# Patient Record
Sex: FEMALE | Race: WHITE | NOT HISPANIC OR LATINO | Employment: UNEMPLOYED | URBAN - METROPOLITAN AREA
[De-identification: names, ages, dates, MRNs, and addresses within clinical notes are randomized per-mention and may not be internally consistent; named-entity substitution may affect disease eponyms.]

---

## 2017-01-09 ENCOUNTER — APPOINTMENT (OUTPATIENT)
Dept: BONE DENSITY | Facility: HOSPITAL | Age: 47
End: 2017-01-09

## 2017-01-10 ENCOUNTER — APPOINTMENT (OUTPATIENT)
Dept: LAB | Facility: HOSPITAL | Age: 47
End: 2017-01-10

## 2017-01-10 ENCOUNTER — APPOINTMENT (OUTPATIENT)
Dept: ONCOLOGY | Facility: CLINIC | Age: 47
End: 2017-01-10

## 2017-01-12 ENCOUNTER — APPOINTMENT (OUTPATIENT)
Dept: BONE DENSITY | Facility: HOSPITAL | Age: 47
End: 2017-01-12

## 2017-01-12 DIAGNOSIS — M81.0 OSTEOPOROSIS: ICD-10-CM

## 2017-01-12 PROCEDURE — 77080 DXA BONE DENSITY AXIAL: CPT

## 2017-03-31 ENCOUNTER — HOSPITAL ENCOUNTER (EMERGENCY)
Facility: HOSPITAL | Age: 47
Discharge: HOME | End: 2017-03-31
Attending: EMERGENCY MEDICINE | Admitting: EMERGENCY MEDICINE

## 2017-03-31 ENCOUNTER — TRANSCRIBE ORDERS (OUTPATIENT)
Dept: ADMINISTRATIVE | Facility: HOSPITAL | Age: 47
End: 2017-03-31

## 2017-03-31 ENCOUNTER — APPOINTMENT (OUTPATIENT)
Dept: GENERAL RADIOLOGY | Facility: HOSPITAL | Age: 47
End: 2017-03-31

## 2017-03-31 VITALS
WEIGHT: 250 LBS | BODY MASS INDEX: 46.01 KG/M2 | RESPIRATION RATE: 18 BRPM | SYSTOLIC BLOOD PRESSURE: 114 MMHG | OXYGEN SATURATION: 97 % | HEIGHT: 62 IN | TEMPERATURE: 98.1 F | HEART RATE: 107 BPM | DIASTOLIC BLOOD PRESSURE: 71 MMHG

## 2017-03-31 DIAGNOSIS — J20.9 ACUTE BRONCHITIS, UNSPECIFIED ORGANISM: Primary | ICD-10-CM

## 2017-03-31 DIAGNOSIS — J18.9 UNRESOLVED PNEUMONIA: Primary | ICD-10-CM

## 2017-03-31 LAB
FLUAV AG NPH QL: NEGATIVE
FLUBV AG NPH QL IA: NEGATIVE

## 2017-03-31 PROCEDURE — 71020 HC CHEST PA AND LATERAL: CPT

## 2017-03-31 PROCEDURE — 99285 EMERGENCY DEPT VISIT HI MDM: CPT | Performed by: EMERGENCY MEDICINE

## 2017-03-31 PROCEDURE — 99283 EMERGENCY DEPT VISIT LOW MDM: CPT

## 2017-03-31 PROCEDURE — 87804 INFLUENZA ASSAY W/OPTIC: CPT | Performed by: EMERGENCY MEDICINE

## 2017-03-31 RX ORDER — AZITHROMYCIN 250 MG/1
TABLET, FILM COATED ORAL
Qty: 6 TABLET | Refills: 0 | Status: SHIPPED | OUTPATIENT
Start: 2017-03-31

## 2017-03-31 RX ORDER — HYDROCORTISONE 10 MG/1
20 TABLET ORAL DAILY
COMMUNITY

## 2017-03-31 RX ORDER — ALBUTEROL SULFATE 2.5 MG/3ML
2.5 SOLUTION RESPIRATORY (INHALATION) EVERY 4 HOURS PRN
COMMUNITY

## 2017-03-31 RX ORDER — LORAZEPAM 2 MG/ML
1 INJECTION INTRAMUSCULAR ONCE
Status: DISCONTINUED | OUTPATIENT
Start: 2017-03-31 | End: 2017-03-31

## 2017-03-31 RX ORDER — HYDROCORTISONE 5 MG/1
5 TABLET ORAL NIGHTLY
COMMUNITY

## 2017-03-31 RX ORDER — SODIUM CHLORIDE 0.9 % (FLUSH) 0.9 %
10 SYRINGE (ML) INJECTION AS NEEDED
Status: DISCONTINUED | OUTPATIENT
Start: 2017-03-31 | End: 2017-03-31

## 2020-05-13 ENCOUNTER — TELEPHONE (OUTPATIENT)
Dept: HEMATOLOGY/ONCOLOGY | Facility: HOSPITAL | Age: 50
End: 2020-05-13

## 2020-05-13 NOTE — TELEPHONE ENCOUNTER
----- Message from Jeff Mcneill MD sent at 5/13/2020  6:55 AM CDT -----  Hi,    I have a consult with her tomorrow. No records in Epic or Media tab? Did anything get faxed?

## 2020-05-13 NOTE — PROGRESS NOTES
[facility] Note    Diagnosis:   APLS    Treatment History:   Coumadin     Visit Diagnosis:  Antiphospholipid antibody syndrome ([facility]  (primary encounter diagnosis)    History of Present Illness: 49F with a PMH of SLE, RA, Osteopo Oral Tab, Take 20 mg by mouth 2 (two) times daily. , Disp: , Rfl:   atorvastatin 10 MG Oral Tab, Take 10 mg by mouth nightly., Disp: , Rfl:   lisinopril 10 MG Oral Tab, Take 10 mg by mouth daily. , Disp: , Rfl:   PREDNISONE 10 MG Oral Tab, Take 10 mg by mout Check CBC, Ferritin, TIBC/Fe, Retic, H87, Folic acid   - Tolerated Venofer in the past.     RA: She states that she is on q6 month Rituxan and prednisone. Referred to Rheumatology.  Will check baseline RF, DAISY    Health Maintenance  - PCP referral  - Will n

## 2020-05-14 ENCOUNTER — OFFICE VISIT (OUTPATIENT)
Dept: HEMATOLOGY/ONCOLOGY | Facility: HOSPITAL | Age: 50
End: 2020-05-14
Attending: INTERNAL MEDICINE
Payer: COMMERCIAL

## 2020-05-14 VITALS
OXYGEN SATURATION: 99 % | BODY MASS INDEX: 49.76 KG/M2 | RESPIRATION RATE: 16 BRPM | WEIGHT: 215 LBS | SYSTOLIC BLOOD PRESSURE: 135 MMHG | HEIGHT: 55 IN | DIASTOLIC BLOOD PRESSURE: 85 MMHG | HEART RATE: 85 BPM

## 2020-05-14 DIAGNOSIS — D68.61 ANTIPHOSPHOLIPID ANTIBODY SYNDROME (HCC): Primary | ICD-10-CM

## 2020-05-14 PROCEDURE — 99204 OFFICE O/P NEW MOD 45 MIN: CPT | Performed by: INTERNAL MEDICINE

## 2020-05-14 RX ORDER — ATORVASTATIN CALCIUM 10 MG/1
10 TABLET, FILM COATED ORAL NIGHTLY
COMMUNITY

## 2020-05-14 RX ORDER — ALPRAZOLAM 0.25 MG/1
0.25 TABLET ORAL NIGHTLY PRN
COMMUNITY

## 2020-05-14 RX ORDER — HYDROCODONE BITARTRATE AND ACETAMINOPHEN 10; 325 MG/1; MG/1
1 TABLET ORAL EVERY 6 HOURS PRN
COMMUNITY
End: 2020-08-24

## 2020-05-14 RX ORDER — WARFARIN SODIUM 5 MG/1
5 TABLET ORAL NIGHTLY
COMMUNITY
End: 2020-09-01

## 2020-05-14 RX ORDER — FLUCONAZOLE 100 MG/1
100 TABLET ORAL DAILY
COMMUNITY

## 2020-05-14 RX ORDER — WARFARIN SODIUM 1 MG/1
1 TABLET ORAL NIGHTLY
COMMUNITY
End: 2020-05-18

## 2020-05-14 RX ORDER — POTASSIUM CHLORIDE 750 MG/1
10 TABLET, EXTENDED RELEASE ORAL 2 TIMES DAILY
COMMUNITY

## 2020-05-14 RX ORDER — BUTALBITAL, ACETAMINOPHEN AND CAFFEINE 50; 325; 40 MG/1; MG/1; MG/1
1 TABLET ORAL EVERY 4 HOURS PRN
COMMUNITY

## 2020-05-14 RX ORDER — LIOTHYRONINE SODIUM 5 UG/1
5 TABLET ORAL DAILY
COMMUNITY

## 2020-05-14 RX ORDER — LISINOPRIL 10 MG/1
10 TABLET ORAL DAILY
COMMUNITY

## 2020-05-14 RX ORDER — PREDNISONE 10 MG/1
10 TABLET ORAL DAILY
COMMUNITY
End: 2021-02-05

## 2020-05-14 RX ORDER — LEVOTHYROXINE SODIUM 0.07 MG/1
75 TABLET ORAL
COMMUNITY

## 2020-05-14 RX ORDER — WARFARIN SODIUM 4 MG/1
4 TABLET ORAL
COMMUNITY
End: 2020-06-29

## 2020-05-14 RX ORDER — FUROSEMIDE 20 MG/1
20 TABLET ORAL 2 TIMES DAILY
COMMUNITY

## 2020-05-14 RX ORDER — PHENTERMINE HYDROCHLORIDE 37.5 MG/1
37.5 TABLET ORAL
COMMUNITY

## 2020-05-18 ENCOUNTER — ANTI-COAG VISIT (OUTPATIENT)
Dept: HEMATOLOGY/ONCOLOGY | Facility: HOSPITAL | Age: 50
End: 2020-05-18

## 2020-05-18 ENCOUNTER — TELEPHONE (OUTPATIENT)
Dept: HEMATOLOGY/ONCOLOGY | Facility: HOSPITAL | Age: 50
End: 2020-05-18

## 2020-05-18 DIAGNOSIS — D68.61 ANTIPHOSPHOLIPID ANTIBODY SYNDROME (HCC): ICD-10-CM

## 2020-05-18 RX ORDER — WARFARIN SODIUM 1 MG/1
3 TABLET ORAL NIGHTLY
Qty: 90 TABLET | Refills: 0 | Status: SHIPPED | OUTPATIENT
Start: 2020-05-18 | End: 2020-09-01

## 2020-05-18 NOTE — TELEPHONE ENCOUNTER
Bonnie Nieto MD  [facility]             Results reviewed. She needs to f/u with Rheumatology, I gave her a referral on the last visit. Thanks      Pt informed and verbalized understanding. She states she also made an appt with a PCP.   First a

## 2020-05-22 ENCOUNTER — ANTI-COAG VISIT (OUTPATIENT)
Dept: HEMATOLOGY/ONCOLOGY | Facility: HOSPITAL | Age: 50
End: 2020-05-22

## 2020-05-22 ENCOUNTER — TELEPHONE (OUTPATIENT)
Dept: HEMATOLOGY/ONCOLOGY | Facility: HOSPITAL | Age: 50
End: 2020-05-22

## 2020-05-22 DIAGNOSIS — D68.61 ANTIPHOSPHOLIPID ANTIBODY SYNDROME (HCC): ICD-10-CM

## 2020-05-22 NOTE — TELEPHONE ENCOUNTER
Denita called to say that her PT/INR was at a 2.2 and her current warfarin does is a 3. Left message to return call.

## 2020-05-29 ENCOUNTER — TELEPHONE (OUTPATIENT)
Dept: HEMATOLOGY/ONCOLOGY | Facility: HOSPITAL | Age: 50
End: 2020-05-29

## 2020-05-29 ENCOUNTER — ANTI-COAG VISIT (OUTPATIENT)
Dept: HEMATOLOGY/ONCOLOGY | Facility: HOSPITAL | Age: 50
End: 2020-05-29

## 2020-05-29 DIAGNOSIS — D68.61 ANTIPHOSPHOLIPID ANTIBODY SYNDROME (HCC): ICD-10-CM

## 2020-05-29 NOTE — TELEPHONE ENCOUNTER
Patient is calling her PT/INR which is 2.5. Denita will be sending in a form for blood testing machine to be filled out.

## 2020-06-04 ENCOUNTER — SOCIAL WORK SERVICES (OUTPATIENT)
Dept: HEMATOLOGY/ONCOLOGY | Facility: HOSPITAL | Age: 50
End: 2020-06-04

## 2020-06-04 ENCOUNTER — TELEPHONE (OUTPATIENT)
Dept: HEMATOLOGY/ONCOLOGY | Facility: HOSPITAL | Age: 50
End: 2020-06-04

## 2020-06-04 NOTE — PROGRESS NOTES
SW contacted patient regarding her INR paperwork. GISEL asked that patient return this call so that SW can help her with this form/letter.

## 2020-06-04 NOTE — TELEPHONE ENCOUNTER
Patient stated that she faxed over some forms for Dr. Daphne Ramirez to fill out so she can purchase a INR meter, because she is renting one and they have increased the fee to $25.00 and she would like a call back to discuss this matter.

## 2020-06-05 ENCOUNTER — ANTI-COAG VISIT (OUTPATIENT)
Dept: HEMATOLOGY/ONCOLOGY | Facility: HOSPITAL | Age: 50
End: 2020-06-05

## 2020-06-05 ENCOUNTER — TELEPHONE (OUTPATIENT)
Dept: HEMATOLOGY/ONCOLOGY | Facility: HOSPITAL | Age: 50
End: 2020-06-05

## 2020-06-05 ENCOUNTER — SOCIAL WORK SERVICES (OUTPATIENT)
Dept: HEMATOLOGY/ONCOLOGY | Facility: HOSPITAL | Age: 50
End: 2020-06-05

## 2020-06-05 DIAGNOSIS — D68.61 ANTIPHOSPHOLIPID ANTIBODY SYNDROME (HCC): ICD-10-CM

## 2020-06-05 NOTE — PROGRESS NOTES
The patients medical authorization form for an INR machine to buy has been completed and faxed. Patient is aware of this. GISEL faxed to [facility] at [phone].

## 2020-06-12 ENCOUNTER — ANTI-COAG VISIT (OUTPATIENT)
Dept: HEMATOLOGY/ONCOLOGY | Facility: HOSPITAL | Age: 50
End: 2020-06-12

## 2020-06-12 ENCOUNTER — TELEPHONE (OUTPATIENT)
Dept: HEMATOLOGY/ONCOLOGY | Facility: HOSPITAL | Age: 50
End: 2020-06-12

## 2020-06-12 DIAGNOSIS — D68.61 ANTIPHOSPHOLIPID ANTIBODY SYNDROME (HCC): ICD-10-CM

## 2020-06-22 ENCOUNTER — ANTI-COAG VISIT (OUTPATIENT)
Dept: HEMATOLOGY/ONCOLOGY | Facility: HOSPITAL | Age: 50
End: 2020-06-22

## 2020-06-22 ENCOUNTER — TELEPHONE (OUTPATIENT)
Dept: HEMATOLOGY/ONCOLOGY | Facility: HOSPITAL | Age: 50
End: 2020-06-22

## 2020-06-22 DIAGNOSIS — D68.61 ANTIPHOSPHOLIPID ANTIBODY SYNDROME (HCC): ICD-10-CM

## 2020-06-24 ENCOUNTER — HOSPITAL ENCOUNTER (OUTPATIENT)
Dept: MAMMOGRAPHY | Age: 50
Discharge: HOME | End: 2020-06-24
Attending: INTERNAL MEDICINE
Payer: COMMERCIAL

## 2020-06-24 DIAGNOSIS — D68.61 ANTIPHOSPHOLIPID ANTIBODY SYNDROME (HCC): ICD-10-CM

## 2020-06-24 PROCEDURE — 77063 BREAST TOMOSYNTHESIS BI: CPT | Performed by: INTERNAL MEDICINE

## 2020-06-24 PROCEDURE — 77067 SCR MAMMO BI INCL CAD: CPT | Performed by: INTERNAL MEDICINE

## 2020-06-25 ENCOUNTER — TELEPHONE (OUTPATIENT)
Dept: HEMATOLOGY/ONCOLOGY | Facility: HOSPITAL | Age: 50
End: 2020-06-25

## 2020-06-29 ENCOUNTER — TELEPHONE (OUTPATIENT)
Dept: HEMATOLOGY/ONCOLOGY | Facility: HOSPITAL | Age: 50
End: 2020-06-29

## 2020-06-29 ENCOUNTER — ANTI-COAG VISIT (OUTPATIENT)
Dept: HEMATOLOGY/ONCOLOGY | Facility: HOSPITAL | Age: 50
End: 2020-06-29

## 2020-06-29 DIAGNOSIS — D68.61 ANTIPHOSPHOLIPID ANTIBODY SYNDROME (HCC): ICD-10-CM

## 2020-06-29 RX ORDER — WARFARIN SODIUM 4 MG/1
4 TABLET ORAL DAILY
Qty: 30 TABLET | Refills: 0 | Status: SHIPPED | OUTPATIENT
Start: 2020-06-29 | End: 2021-07-20

## 2020-07-10 ENCOUNTER — ANTI-COAG VISIT (OUTPATIENT)
Dept: HEMATOLOGY/ONCOLOGY | Facility: HOSPITAL | Age: 50
End: 2020-07-10

## 2020-07-10 ENCOUNTER — TELEPHONE (OUTPATIENT)
Dept: HEMATOLOGY/ONCOLOGY | Facility: HOSPITAL | Age: 50
End: 2020-07-10

## 2020-07-10 DIAGNOSIS — D68.61 ANTIPHOSPHOLIPID ANTIBODY SYNDROME (HCC): ICD-10-CM

## 2020-07-10 LAB — INR: 2.5 (ref 0.8–1.2)

## 2020-07-20 ENCOUNTER — TELEPHONE (OUTPATIENT)
Dept: HEMATOLOGY/ONCOLOGY | Facility: HOSPITAL | Age: 50
End: 2020-07-20

## 2020-07-20 ENCOUNTER — ANTI-COAG VISIT (OUTPATIENT)
Dept: HEMATOLOGY/ONCOLOGY | Facility: HOSPITAL | Age: 50
End: 2020-07-20

## 2020-07-20 DIAGNOSIS — D68.61 ANTIPHOSPHOLIPID ANTIBODY SYNDROME (HCC): ICD-10-CM

## 2020-07-20 LAB — INR: 2.3 (ref 0.8–1.2)

## 2020-07-21 ENCOUNTER — TELEPHONE (OUTPATIENT)
Dept: HEMATOLOGY/ONCOLOGY | Facility: HOSPITAL | Age: 50
End: 2020-07-21

## 2020-07-21 LAB — INR: 3 (ref 0.8–1.2)

## 2020-07-21 NOTE — TELEPHONE ENCOUNTER
Denita says the INR machine was broken and not working right, sos he's not sure her INR she reported was accurate. She says today it is 3.0.  Please call

## 2020-07-31 ENCOUNTER — ANTI-COAG VISIT (OUTPATIENT)
Dept: HEMATOLOGY/ONCOLOGY | Facility: HOSPITAL | Age: 50
End: 2020-07-31

## 2020-07-31 ENCOUNTER — TELEPHONE (OUTPATIENT)
Dept: HEMATOLOGY/ONCOLOGY | Facility: HOSPITAL | Age: 50
End: 2020-07-31

## 2020-07-31 DIAGNOSIS — D68.61 ANTIPHOSPHOLIPID ANTIBODY SYNDROME (HCC): ICD-10-CM

## 2020-07-31 LAB — INR: 2.4 (ref 0.8–1.2)

## 2020-08-07 ENCOUNTER — TELEPHONE (OUTPATIENT)
Dept: FAMILY MEDICINE CLINIC | Facility: CLINIC | Age: 50
End: 2020-08-07

## 2020-08-07 NOTE — TELEPHONE ENCOUNTER
Patient was a no show for her new patient appt with Dr. Yin KAPLAN for patient to call office to RS.

## 2020-08-18 ENCOUNTER — ANTI-COAG VISIT (OUTPATIENT)
Dept: HEMATOLOGY/ONCOLOGY | Facility: HOSPITAL | Age: 50
End: 2020-08-18

## 2020-08-18 DIAGNOSIS — D68.61 ANTIPHOSPHOLIPID ANTIBODY SYNDROME (HCC): ICD-10-CM

## 2020-08-18 LAB — INR: 2.5 (ref 0.8–1.2)

## 2020-08-24 ENCOUNTER — OFFICE VISIT (OUTPATIENT)
Dept: RHEUMATOLOGY | Facility: CLINIC | Age: 50
End: 2020-08-24
Payer: COMMERCIAL

## 2020-08-24 VITALS
HEART RATE: 94 BPM | WEIGHT: 210.81 LBS | DIASTOLIC BLOOD PRESSURE: 80 MMHG | HEIGHT: 62 IN | RESPIRATION RATE: 18 BRPM | TEMPERATURE: 98 F | BODY MASS INDEX: 38.79 KG/M2 | SYSTOLIC BLOOD PRESSURE: 130 MMHG

## 2020-08-24 DIAGNOSIS — M25.50 POLYARTHRALGIA: ICD-10-CM

## 2020-08-24 DIAGNOSIS — D68.61 ANTIPHOSPHOLIPID ANTIBODY SYNDROME (HCC): ICD-10-CM

## 2020-08-24 DIAGNOSIS — Z87.39 HISTORY OF RHEUMATOID ARTHRITIS: ICD-10-CM

## 2020-08-24 DIAGNOSIS — R76.8 SS-A ANTIBODY POSITIVE: ICD-10-CM

## 2020-08-24 DIAGNOSIS — R76.8 HISTONE ANTIBODY POSITIVE: ICD-10-CM

## 2020-08-24 DIAGNOSIS — R76.8 DS DNA ANTIBODY POSITIVE: ICD-10-CM

## 2020-08-24 DIAGNOSIS — R53.82 CHRONIC FATIGUE: ICD-10-CM

## 2020-08-24 DIAGNOSIS — M32.19 OTHER SYSTEMIC LUPUS ERYTHEMATOSUS WITH OTHER ORGAN INVOLVEMENT (HCC): Primary | ICD-10-CM

## 2020-08-24 PROCEDURE — 99244 OFF/OP CNSLTJ NEW/EST MOD 40: CPT | Performed by: INTERNAL MEDICINE

## 2020-08-24 PROCEDURE — 3079F DIAST BP 80-89 MM HG: CPT | Performed by: INTERNAL MEDICINE

## 2020-08-24 PROCEDURE — 3075F SYST BP GE 130 - 139MM HG: CPT | Performed by: INTERNAL MEDICINE

## 2020-08-24 PROCEDURE — 3008F BODY MASS INDEX DOCD: CPT | Performed by: INTERNAL MEDICINE

## 2020-08-24 RX ORDER — HYDROCODONE BITARTRATE AND ACETAMINOPHEN 10; 325 MG/1; MG/1
1 TABLET ORAL EVERY 6 HOURS PRN
Qty: 120 TABLET | Refills: 0 | Status: SHIPPED | OUTPATIENT
Start: 2020-08-24

## 2020-08-24 RX ORDER — FUROSEMIDE 20 MG/1
TABLET ORAL
COMMUNITY
End: 2020-08-24

## 2020-08-24 RX ORDER — TOPIRAMATE 100 MG/1
TABLET, FILM COATED ORAL 2 TIMES DAILY
COMMUNITY
Start: 2020-07-23

## 2020-08-24 RX ORDER — LEVOTHYROXINE SODIUM 0.07 MG/1
TABLET ORAL
COMMUNITY
End: 2020-08-24

## 2020-08-24 RX ORDER — LIOTHYRONINE SODIUM 5 UG/1
TABLET ORAL
COMMUNITY
End: 2020-08-24

## 2020-08-24 NOTE — PATIENT INSTRUCTIONS
-- get updated labs to evaluate for status of lupus and RA  -- have records faxed to my office [phone]  -- for dry mouth, try OTC biotene products (toothpaste, oral rinses, lozenges)   -- follow up in about 2-3 weeks to discuss test results in detail

## 2020-08-24 NOTE — PROGRESS NOTES
?  RHEUMATOLOGY NEW PATIENT   Date of visit: 8/24/2020  ? Patient presents with:  Establish Care: new pt.  Dr. Maribell Nicole referral. Pt has lupus (Dx at age 7), fibro (dx in 2015 during knee replacement), and RA (dx at 11years old), gets blood clots from surg to some fatty liver, so will need to be cautious and monitor her LFTs closely. We will refill her [place] this time, however I will defer this to her PCP. I do not treat fibromyalgia with opioids.   However if patient's PCP believes this is necessary, I bethel PLATELET  -     COMP METABOLIC PANEL (14)  -     URINALYSIS, ROUTINE  -     DSDNA (CRITHIDIA LUCILIAE) ANTIBODY IGG BY IFA;  Future  -     HEPATITIS PANEL, ACUTE (4)  -     QUANTIFERON TB    History of rheumatoid arthritis  -     DAISY BY IFA WITH REFLEX  - new rheumatologist.    States at age 7, diagnosed with both RA and lupus. States symptoms at that time were her passing out, dx with fluid around the heart/pericarditis. States RA dx based off blood tests. Was treated with prednisone for several years.  St ambulate with a cane, worsened with prolonged activity  + morning stiffness, (worst in right leg) but felt in ankles bilaterally  + chronic changes of the right hand from previous inflammation.  Tried to have surgically fixed but state did not last for more times daily. , Disp: , Rfl:   HYDROcodone-acetaminophen  MG Oral Tab, Take 1 tablet by mouth every 6 (six) hours as needed for Pain., Disp: 120 tablet, Rfl: 0  Warfarin Sodium 5 MG Oral Tab, Take 5 mg by mouth nightly., Disp: , Rfl:   furosemide 20 MG ?  Review of Systems   Constitutional: Positive for malaise/fatigue. Negative for chills, fever and weight loss. HENT: Positive for congestion. Negative for hearing loss, nosebleeds, sore throat and tinnitus. Eyes: Positive for blurred vision.  Domenico Box normal and breath sounds normal. No respiratory distress. She has no wheezes. Abdominal: Soft. She exhibits no distension. Musculoskeletal:         General: Tenderness and deformity present. No edema.       Comments: Reversible ulnar deviation with MCP (H) 05/14/2020    BUNCREA 18.3 05/14/2020    CREATSERUM 1.04 (H) 05/14/2020    ANIONGAP 6 05/14/2020    GFRNAA 64 05/14/2020    GFRAA 73 05/14/2020    CA 8.7 05/14/2020    OSMOCALC 294 05/14/2020    ALKPHO 81 05/14/2020    AST 16 05/14/2020    ALT 16 05/14

## 2020-09-01 ENCOUNTER — ANTI-COAG VISIT (OUTPATIENT)
Dept: HEMATOLOGY/ONCOLOGY | Facility: HOSPITAL | Age: 50
End: 2020-09-01

## 2020-09-01 ENCOUNTER — TELEPHONE (OUTPATIENT)
Dept: HEMATOLOGY/ONCOLOGY | Facility: HOSPITAL | Age: 50
End: 2020-09-01

## 2020-09-01 DIAGNOSIS — D68.61 ANTIPHOSPHOLIPID ANTIBODY SYNDROME (HCC): ICD-10-CM

## 2020-09-01 LAB — INR: 1.8 (ref 0.8–1.2)

## 2020-09-01 RX ORDER — WARFARIN SODIUM 1 MG/1
1 TABLET ORAL NIGHTLY
Qty: 30 TABLET | Refills: 0 | Status: SHIPPED | OUTPATIENT
Start: 2020-09-01 | End: 2021-07-20

## 2020-09-01 RX ORDER — WARFARIN SODIUM 5 MG/1
5 TABLET ORAL NIGHTLY
Qty: 30 TABLET | Refills: 0 | Status: SHIPPED | OUTPATIENT
Start: 2020-09-01 | End: 2021-07-20

## 2020-09-01 NOTE — PROGRESS NOTES
THE [facility] Hematology and Oncology Clinic Note    Diagnosis:   Recurrent DVT    Treatment History:   Coumadin     Visit Diagnosis:  Antiphospholipid antibody syndrome ([facility]  (primary encounter diagnosis)  Leg pain, anterior, right    History of Present Illness was negative for LA, B2GP and Cardiolipins. She met with Dr. Phylicia Javier on 8/24/20 and AI work up in process. MTX was also discussed. Today she states that she is very stressed since her brother was arrested in [place]. Her dog also recently passed away.  She mcg by mouth before breakfast., Disp: , Rfl:     No current facility-administered medications on file prior to visit.      Past Medical History:   Diagnosis Date   • DVT, recurrent, lower extremity, acute, right (HCC)    • Fatty liver    • Fibromyalgia    • CO2 25.0 05/14/2020     05/14/2020    BUN 19 (H) 05/14/2020    ALB 3.8 05/14/2020       Radiology: n/a    Pathology: n/a    Assessment and Plan:  Recurrent Venous and Arterial Thrombosis and ?possible APLS  · reported to be triple positive with re

## 2020-09-01 NOTE — TELEPHONE ENCOUNTER
Spoke with patient. She is also due for f/u. Requested appointment for tomorrow. Appointment made. Time/date/location all verified and confirmed with patient.

## 2020-09-02 ENCOUNTER — TELEPHONE (OUTPATIENT)
Dept: HEMATOLOGY/ONCOLOGY | Facility: HOSPITAL | Age: 50
End: 2020-09-02

## 2020-09-02 ENCOUNTER — OFFICE VISIT (OUTPATIENT)
Dept: HEMATOLOGY/ONCOLOGY | Facility: HOSPITAL | Age: 50
End: 2020-09-02
Attending: INTERNAL MEDICINE
Payer: COMMERCIAL

## 2020-09-02 ENCOUNTER — HOSPITAL ENCOUNTER (OUTPATIENT)
Dept: CT IMAGING | Facility: HOSPITAL | Age: 50
Discharge: HOME | End: 2020-09-02
Attending: INTERNAL MEDICINE
Payer: COMMERCIAL

## 2020-09-02 ENCOUNTER — HOSPITAL ENCOUNTER (OUTPATIENT)
Dept: ULTRASOUND IMAGING | Facility: HOSPITAL | Age: 50
Discharge: HOME | End: 2020-09-02
Attending: INTERNAL MEDICINE
Payer: COMMERCIAL

## 2020-09-02 VITALS
HEIGHT: 62.01 IN | SYSTOLIC BLOOD PRESSURE: 123 MMHG | WEIGHT: 211.38 LBS | DIASTOLIC BLOOD PRESSURE: 78 MMHG | TEMPERATURE: 98 F | OXYGEN SATURATION: 98 % | BODY MASS INDEX: 38.41 KG/M2 | HEART RATE: 83 BPM | RESPIRATION RATE: 16 BRPM

## 2020-09-02 DIAGNOSIS — M25.50 POLYARTHRALGIA: ICD-10-CM

## 2020-09-02 DIAGNOSIS — M79.604 LEG PAIN, ANTERIOR, RIGHT: ICD-10-CM

## 2020-09-02 DIAGNOSIS — R76.8 HISTONE ANTIBODY POSITIVE: ICD-10-CM

## 2020-09-02 DIAGNOSIS — R76.8 DS DNA ANTIBODY POSITIVE: ICD-10-CM

## 2020-09-02 DIAGNOSIS — Z87.39 HISTORY OF RHEUMATOID ARTHRITIS: ICD-10-CM

## 2020-09-02 DIAGNOSIS — D68.61 ANTIPHOSPHOLIPID ANTIBODY SYNDROME (HCC): Primary | ICD-10-CM

## 2020-09-02 DIAGNOSIS — D68.61 ANTIPHOSPHOLIPID ANTIBODY SYNDROME (HCC): ICD-10-CM

## 2020-09-02 DIAGNOSIS — R76.8 SS-A ANTIBODY POSITIVE: ICD-10-CM

## 2020-09-02 DIAGNOSIS — M32.19 OTHER SYSTEMIC LUPUS ERYTHEMATOSUS WITH OTHER ORGAN INVOLVEMENT (HCC): ICD-10-CM

## 2020-09-02 LAB
ALBUMIN SERPL-MCNC: 3.6 G/DL (ref 3.4–5)
ALBUMIN/GLOB SERPL: 1.2 {RATIO} (ref 1–2)
ALP LIVER SERPL-CCNC: 77 U/L (ref 39–100)
ALT SERPL-CCNC: 13 U/L (ref 13–56)
ANION GAP SERPL CALC-SCNC: 5 MMOL/L (ref 0–18)
AST SERPL-CCNC: 13 U/L (ref 15–37)
BASOPHILS # BLD AUTO: 0.01 X10(3) UL (ref 0–0.2)
BASOPHILS NFR BLD AUTO: 0.1 %
BILIRUB SERPL-MCNC: 0.3 MG/DL (ref 0.1–2)
BILIRUB UR QL STRIP.AUTO: NEGATIVE
BUN BLD-MCNC: 19 MG/DL (ref 7–18)
BUN/CREAT SERPL: 20.2 (ref 10–20)
C3 SERPL-MCNC: 114 MG/DL (ref 90–180)
C4 SERPL-MCNC: 22.4 MG/DL (ref 10–40)
CALCIUM BLD-MCNC: 9.1 MG/DL (ref 8.5–10.1)
CHLORIDE SERPL-SCNC: 113 MMOL/L (ref 98–112)
CLARITY UR REFRACT.AUTO: CLEAR
CO2 SERPL-SCNC: 23 MMOL/L (ref 21–32)
COLOR UR AUTO: YELLOW
CREAT BLD-MCNC: 0.94 MG/DL (ref 0.55–1.02)
CRP SERPL-MCNC: 1.89 MG/DL (ref ?–0.3)
D-DIMER: 0.74 UG/ML FEU (ref ?–0.5)
DEPRECATED HBV CORE AB SER IA-ACNC: 166.7 NG/ML (ref 12–240)
DEPRECATED RDW RBC AUTO: 41.5 FL (ref 35.1–46.3)
EOSINOPHIL # BLD AUTO: 0.03 X10(3) UL (ref 0–0.7)
EOSINOPHIL NFR BLD AUTO: 0.3 %
ERYTHROCYTE [DISTWIDTH] IN BLOOD BY AUTOMATED COUNT: 12.7 % (ref 11–15)
GLOBULIN PLAS-MCNC: 3 G/DL (ref 2.8–4.4)
GLUCOSE BLD-MCNC: 102 MG/DL (ref 70–99)
GLUCOSE UR STRIP.AUTO-MCNC: NEGATIVE MG/DL
HAV IGM SER QL: NONREACTIVE
HBV CORE IGM SER QL: NONREACTIVE
HBV SURFACE AG SERPL QL IA: NONREACTIVE
HCT VFR BLD AUTO: 40.9 % (ref 35–48)
HCV AB SERPL QL IA: NONREACTIVE
HGB BLD-MCNC: 13 G/DL (ref 12–16)
IGA SERPL-MCNC: 142 MG/DL (ref 70–312)
IGM SERPL-MCNC: 17.2 MG/DL (ref 43–279)
IMM GRANULOCYTES # BLD AUTO: 0.02 X10(3) UL (ref 0–1)
IMM GRANULOCYTES NFR BLD: 0.2 %
IMMUNOGLOBULIN PNL SER-MCNC: 619 MG/DL (ref 791–1643)
IRON SATURATION: 21 % (ref 15–50)
IRON SERPL-MCNC: 60 UG/DL (ref 50–170)
KETONES UR STRIP.AUTO-MCNC: NEGATIVE MG/DL
LEUKOCYTE ESTERASE UR QL STRIP.AUTO: NEGATIVE
LYMPHOCYTES # BLD AUTO: 1.11 X10(3) UL (ref 1–4)
LYMPHOCYTES NFR BLD AUTO: 12.8 %
M PROTEIN MFR SERPL ELPH: 6.6 G/DL (ref 6.4–8.2)
MCH RBC QN AUTO: 28.6 PG (ref 26–34)
MCHC RBC AUTO-ENTMCNC: 31.8 G/DL (ref 31–37)
MCV RBC AUTO: 89.9 FL (ref 80–100)
MONOCYTES # BLD AUTO: 0.3 X10(3) UL (ref 0.1–1)
MONOCYTES NFR BLD AUTO: 3.4 %
NEUTROPHILS # BLD AUTO: 7.23 X10 (3) UL (ref 1.5–7.7)
NEUTROPHILS # BLD AUTO: 7.23 X10(3) UL (ref 1.5–7.7)
NEUTROPHILS NFR BLD AUTO: 83.2 %
NITRITE UR QL STRIP.AUTO: NEGATIVE
OSMOLALITY SERPL CALC.SUM OF ELEC: 294 MOSM/KG (ref 275–295)
PH UR STRIP.AUTO: 7 [PH] (ref 4.5–8)
PLATELET # BLD AUTO: 228 10(3)UL (ref 150–450)
POTASSIUM SERPL-SCNC: 4 MMOL/L (ref 3.5–5.1)
PROT UR STRIP.AUTO-MCNC: NEGATIVE MG/DL
RBC # BLD AUTO: 4.55 X10(6)UL (ref 3.8–5.3)
RBC UR QL AUTO: NEGATIVE
RHEUMATOID FACT SERPL-ACNC: <10 IU/ML (ref ?–15)
SED RATE-ML: 12 MM/HR (ref 0–25)
SODIUM SERPL-SCNC: 141 MMOL/L (ref 136–145)
SP GR UR STRIP.AUTO: 1.02 (ref 1–1.03)
TOTAL IRON BINDING CAPACITY: 289 UG/DL (ref 240–450)
TRANSFERRIN SERPL-MCNC: 194 MG/DL (ref 200–360)
UROBILINOGEN UR STRIP.AUTO-MCNC: <2 MG/DL
WBC # BLD AUTO: 8.7 X10(3) UL (ref 4–11)

## 2020-09-02 PROCEDURE — 85652 RBC SED RATE AUTOMATED: CPT | Performed by: INTERNAL MEDICINE

## 2020-09-02 PROCEDURE — 86038 ANTINUCLEAR ANTIBODIES: CPT | Performed by: INTERNAL MEDICINE

## 2020-09-02 PROCEDURE — 86140 C-REACTIVE PROTEIN: CPT | Performed by: INTERNAL MEDICINE

## 2020-09-02 PROCEDURE — 86480 TB TEST CELL IMMUN MEASURE: CPT | Performed by: INTERNAL MEDICINE

## 2020-09-02 PROCEDURE — 86160 COMPLEMENT ANTIGEN: CPT | Performed by: INTERNAL MEDICINE

## 2020-09-02 PROCEDURE — 93970 EXTREMITY STUDY: CPT | Performed by: INTERNAL MEDICINE

## 2020-09-02 PROCEDURE — 80074 ACUTE HEPATITIS PANEL: CPT | Performed by: INTERNAL MEDICINE

## 2020-09-02 PROCEDURE — 71275 CT ANGIOGRAPHY CHEST: CPT | Performed by: INTERNAL MEDICINE

## 2020-09-02 PROCEDURE — 86225 DNA ANTIBODY NATIVE: CPT | Performed by: INTERNAL MEDICINE

## 2020-09-02 PROCEDURE — 86235 NUCLEAR ANTIGEN ANTIBODY: CPT | Performed by: INTERNAL MEDICINE

## 2020-09-02 PROCEDURE — 82784 ASSAY IGA/IGD/IGG/IGM EACH: CPT | Performed by: INTERNAL MEDICINE

## 2020-09-02 PROCEDURE — 99214 OFFICE O/P EST MOD 30 MIN: CPT | Performed by: INTERNAL MEDICINE

## 2020-09-02 PROCEDURE — 81003 URINALYSIS AUTO W/O SCOPE: CPT | Performed by: INTERNAL MEDICINE

## 2020-09-02 PROCEDURE — 86200 CCP ANTIBODY: CPT | Performed by: INTERNAL MEDICINE

## 2020-09-02 PROCEDURE — 86431 RHEUMATOID FACTOR QUANT: CPT | Performed by: INTERNAL MEDICINE

## 2020-09-02 NOTE — PROGRESS NOTES
Education Record    Learner:  Patient    Disease / Diagnosis:antiphospholipid antibody syndrome     Barriers / Limitations:  None   Comments:    Method:  Discussion   Comments:    General Topics:  Plan of care reviewed   Comments:    Outcome:  Shows unders

## 2020-09-02 NOTE — TELEPHONE ENCOUNTER
Patient scheduled for STAT BLE US and Chest CT per MD request. Patient directed to outpatient registration.

## 2020-09-03 ENCOUNTER — TELEPHONE (OUTPATIENT)
Dept: HEMATOLOGY/ONCOLOGY | Facility: HOSPITAL | Age: 50
End: 2020-09-03

## 2020-09-03 DIAGNOSIS — R91.1 PULMONARY NODULE: Primary | ICD-10-CM

## 2020-09-03 NOTE — TELEPHONE ENCOUNTER
MD Valentina Valdes, RN   Caller: Unspecified (Today,  2:58 PM)             I agree likely old infection. Will repeat her CT Chest (which I ordered). If persistently abnormal, I will have her see a pulmonologist. Thanks!       Reviewed

## 2020-09-03 NOTE — TELEPHONE ENCOUNTER
Michelle Husain MD  [facility]             Results reviewed. CTA without PE. Incidental note of small area of lung scarring in right lower lobe. Will repeat a CT chest in 3 months. Reviewed the above with patient.    She verbalized understand

## 2020-09-04 LAB
ANA SCREEN: POSITIVE
ANTI-NUCLEAR ANTIBODY (ANA), HEP-2 IGG: DETECTED
BETA 2 GLYCOPROTEIN 1 AB, IGG: 1.3 U/ML (ref ?–7)
BETA 2 GLYCOPROTEIN 1 AB, IGM: <2.9 U/ML (ref ?–7)
CARDIOLIPIN IGG SERPL-ACNC: 1.3 GPL (ref ?–10)
CARDIOLIPIN IGM SERPL-ACNC: 0.8 MPL (ref ?–10)
CCP IGG SERPL-ACNC: 0.6 U/ML (ref 0–6.9)
CENTROMERE AUTOAB: <100 AU/ML (ref ?–100)
DSDNA AUTOAB: 178 IU/ML (ref ?–100)
HISTONE AUTOAB: 240 AU/ML (ref ?–100)
JO-1 AUTOAB: <100 AU/ML (ref ?–100)
M TB IFN-G CD4+ T-CELLS BLD-ACNC: 0.01 IU/ML
M TB TUBERC IFN-G BLD QL: NEGATIVE
M TB TUBERC IGNF/MITOGEN IGNF CONTROL: 8.17 IU/ML
QUANTIFERON TB1 MINUS NIL: 0.02 IU/ML
QUANTIFERON TB2 MINUS NIL: 0 IU/ML
RNP AUTOAB: <100 AU/ML (ref ?–100)
SCL-70 AUTOAB: <100 AU/ML (ref ?–100)
SM AUTOAB (SMITH): <100 AU/ML (ref ?–100)
SSA AUTOAB: 376 AU/ML (ref ?–100)
SSB AUTOAB: <100 AU/ML (ref ?–100)

## 2020-09-06 LAB
APTT PPP: 42.4 SECONDS (ref 25.4–36.1)
DRVVT LUPUS ANTICOAGULANT: NEGATIVE
DRVVT SCREEN RATIO: 0.87 (ref 0–1.29)
PT(LUPUS): 21 SECONDS (ref 12.1–14.7)
STACLOT LA DELTA: 7.4 SECONDS (ref ?–8)
STACLOT LA: NEGATIVE

## 2020-09-10 ENCOUNTER — OFFICE VISIT (OUTPATIENT)
Dept: RHEUMATOLOGY | Facility: CLINIC | Age: 50
End: 2020-09-10
Payer: COMMERCIAL

## 2020-09-10 VITALS
DIASTOLIC BLOOD PRESSURE: 80 MMHG | RESPIRATION RATE: 18 BRPM | TEMPERATURE: 98 F | HEIGHT: 62 IN | HEART RATE: 92 BPM | WEIGHT: 213.63 LBS | SYSTOLIC BLOOD PRESSURE: 120 MMHG | BODY MASS INDEX: 39.31 KG/M2

## 2020-09-10 DIAGNOSIS — D68.61 ANTIPHOSPHOLIPID ANTIBODY SYNDROME (HCC): ICD-10-CM

## 2020-09-10 DIAGNOSIS — R79.82 ELEVATED C-REACTIVE PROTEIN (CRP): ICD-10-CM

## 2020-09-10 DIAGNOSIS — R76.8 DS DNA ANTIBODY POSITIVE: ICD-10-CM

## 2020-09-10 DIAGNOSIS — M32.19 OTHER SYSTEMIC LUPUS ERYTHEMATOSUS WITH OTHER ORGAN INVOLVEMENT (HCC): Primary | ICD-10-CM

## 2020-09-10 DIAGNOSIS — Z79.899 HIGH RISK MEDICATION USE: ICD-10-CM

## 2020-09-10 DIAGNOSIS — M25.50 POLYARTHRALGIA: ICD-10-CM

## 2020-09-10 DIAGNOSIS — R76.8 SS-A ANTIBODY POSITIVE: ICD-10-CM

## 2020-09-10 DIAGNOSIS — Z87.39 HISTORY OF RHEUMATOID ARTHRITIS: ICD-10-CM

## 2020-09-10 DIAGNOSIS — R53.82 CHRONIC FATIGUE: ICD-10-CM

## 2020-09-10 DIAGNOSIS — R76.8 HISTONE ANTIBODY POSITIVE: ICD-10-CM

## 2020-09-10 PROCEDURE — 3074F SYST BP LT 130 MM HG: CPT | Performed by: INTERNAL MEDICINE

## 2020-09-10 PROCEDURE — 3008F BODY MASS INDEX DOCD: CPT | Performed by: INTERNAL MEDICINE

## 2020-09-10 PROCEDURE — 3079F DIAST BP 80-89 MM HG: CPT | Performed by: INTERNAL MEDICINE

## 2020-09-10 PROCEDURE — 99214 OFFICE O/P EST MOD 30 MIN: CPT | Performed by: INTERNAL MEDICINE

## 2020-09-10 RX ORDER — FOLIC ACID 1 MG/1
1 TABLET ORAL DAILY
Qty: 90 TABLET | Refills: 0 | Status: SHIPPED | OUTPATIENT
Start: 2020-09-10

## 2020-09-10 RX ORDER — METHOTREXATE 2.5 MG/1
15 TABLET ORAL WEEKLY
Qty: 72 TABLET | Refills: 0 | Status: SHIPPED | OUTPATIENT
Start: 2020-09-10 | End: 2020-12-09

## 2020-09-10 NOTE — PATIENT INSTRUCTIONS
-- start methotrexate 6 tabs once weekly (okay to split and take 3 in am and 3 in pm but on SAME day)  -- start folic acid 1mg daily   -- keep me updated if any side effects experienced  -- read about methotrexate with handout provided   -- get monthly (no

## 2020-09-10 NOTE — PROGRESS NOTES
?  RHEUMATOLOGY Follow up   Date of visit: 9/10/2020  ? Patient presents with:  SLE: 2 week f/u. feeling ok. coming back to discuss lab results. Emotions on edge due to dog passing away. Joint swelling.       ?  ASSESSMENT, DISCUSSION & PLAN   Assessment (non-fasting) blood tests which are in the system (just remember to schedule them ahead of time since they're not taking walk-ins)  -- follow up in 3 months or sooner as needed  -- in the meantime, reach out to your prior rheumatologist and have them fax o protein (CRP)  -     C-REACTIVE PROTEIN; Standing        Return in about 3 months (around 12/10/2020). ? HPI   Saray Sullivan is a 51year old female with the following active problems who is seen for medically necessary follow-up today.  She was [facility] was stopped working for time, has been a few years prior. Did have rituximab infusions (getting every 6 months) last infusion while living in [place]. [facility] was due for another infusion, but moved locally.    Hx of antiphospholipid syndrome, [facility] being on prescription eye drops   + dry mouth, constant drinking water      Increased lower left back pain. Denies hx of renal or liver disease except possible fatty liver.      Does take Norco 10-325mg, takes about twice daily right now.      ?  Past atorvastatin 10 MG Oral Tab, Take 10 mg by mouth nightly., Disp: , Rfl:   lisinopril 10 MG Oral Tab, Take 10 mg by mouth daily. , Disp: , Rfl:   PREDNISONE 10 MG Oral Tab, Take 10 mg by mouth daily.  Three times a day, Disp: , Rfl:   Potassium Chloride ER Neurological: Positive for dizziness, tingling, weakness and headaches. Negative for seizures. Endo/Heme/Allergies: Bruises/bleeds easily. Psychiatric/Behavioral: Positive for depression. The patient is nervous/anxious.  The patient does not have inso ankles, or joints of the feet. Deferred fibro-exam today, will reevaluate at next visit   Lymphadenopathy:     She has no cervical adenopathy. Neurological: She is alert and oriented to person, place, and time. No cranial nerve deficit.    Skin: Skin is 09/02/2020    BAABSO 0.01 09/02/2020     Lab Results   Component Value Date     (H) 09/02/2020    BUN 19 (H) 09/02/2020    BUNCREA 20.2 (H) 09/02/2020    CREATSERUM 0.94 09/02/2020    ANIONGAP 5 09/02/2020    GFRNAA 72 09/02/2020    GFRAA 83 09/02/2

## 2020-09-22 ENCOUNTER — PATIENT MESSAGE (OUTPATIENT)
Dept: RHEUMATOLOGY | Facility: CLINIC | Age: 50
End: 2020-09-22

## 2020-09-22 DIAGNOSIS — R06.02 SHORTNESS OF BREATH: Primary | ICD-10-CM

## 2020-09-22 NOTE — TELEPHONE ENCOUNTER
----- Message from Van Gross sent at 9/22/2020  3:53 PM CDT -----  Regarding: Other  Contact: [phone]  Nafisa Ornelas,     Had a few questions to ask you.  I started the methotrexate a few weeks ago and for about a week now I have been getting

## 2020-09-22 NOTE — TELEPHONE ENCOUNTER
Notified Dr. Tamiko Rivers of patients complaints of SOB, and other symptoms. Phoned pt and explained that Vida Mendez would like her to have Covid testing and to stop taking methotrexate at this time.  Pt verbalized understanding,

## 2020-09-23 ENCOUNTER — APPOINTMENT (OUTPATIENT)
Dept: LAB | Facility: HOSPITAL | Age: 50
End: 2020-09-23
Attending: INTERNAL MEDICINE
Payer: COMMERCIAL

## 2020-09-23 DIAGNOSIS — R06.02 SHORTNESS OF BREATH: ICD-10-CM

## 2020-09-25 LAB — SARS-COV-2 RNA RESP QL NAA+PROBE: NOT DETECTED

## 2020-10-02 ENCOUNTER — TELEPHONE (OUTPATIENT)
Dept: HEMATOLOGY/ONCOLOGY | Facility: HOSPITAL | Age: 50
End: 2020-10-02

## 2020-10-02 ENCOUNTER — ANTI-COAG VISIT (OUTPATIENT)
Dept: HEMATOLOGY/ONCOLOGY | Facility: HOSPITAL | Age: 50
End: 2020-10-02

## 2020-10-02 DIAGNOSIS — D68.61 ANTIPHOSPHOLIPID ANTIBODY SYNDROME (HCC): ICD-10-CM

## 2020-10-02 NOTE — TELEPHONE ENCOUNTER
Denita is calling her INR which is 1.4.  Patient said when she checks her blood it's super thin and her nose is starting to bleed

## 2020-10-07 ENCOUNTER — TELEPHONE (OUTPATIENT)
Dept: HEMATOLOGY/ONCOLOGY | Age: 50
End: 2020-10-07

## 2020-10-07 ENCOUNTER — ANTI-COAG VISIT (OUTPATIENT)
Dept: HEMATOLOGY/ONCOLOGY | Facility: HOSPITAL | Age: 50
End: 2020-10-07

## 2020-10-07 DIAGNOSIS — D68.61 ANTIPHOSPHOLIPID ANTIBODY SYNDROME (HCC): ICD-10-CM

## 2020-10-07 RX ORDER — WARFARIN SODIUM 2.5 MG/1
TABLET ORAL NIGHTLY
Qty: 90 TABLET | Refills: 0 | Status: SHIPPED | OUTPATIENT
Start: 2020-10-07 | End: 2021-07-20

## 2020-11-16 ENCOUNTER — ANTI-COAG VISIT (OUTPATIENT)
Dept: HEMATOLOGY/ONCOLOGY | Facility: HOSPITAL | Age: 50
End: 2020-11-16

## 2020-11-16 ENCOUNTER — TELEPHONE (OUTPATIENT)
Dept: HEMATOLOGY/ONCOLOGY | Age: 50
End: 2020-11-16

## 2020-11-16 DIAGNOSIS — D68.61 ANTIPHOSPHOLIPID ANTIBODY SYNDROME (HCC): ICD-10-CM

## 2020-11-22 ENCOUNTER — PATIENT MESSAGE (OUTPATIENT)
Dept: RHEUMATOLOGY | Facility: CLINIC | Age: 50
End: 2020-11-22

## 2020-11-22 DIAGNOSIS — Z79.899 HIGH RISK MEDICATION USE: ICD-10-CM

## 2020-11-22 DIAGNOSIS — D68.61 ANTIPHOSPHOLIPID ANTIBODY SYNDROME (HCC): ICD-10-CM

## 2020-11-22 DIAGNOSIS — M32.19 OTHER SYSTEMIC LUPUS ERYTHEMATOSUS WITH OTHER ORGAN INVOLVEMENT (HCC): Primary | ICD-10-CM

## 2020-11-22 DIAGNOSIS — M25.50 POLYARTHRALGIA: ICD-10-CM

## 2020-11-25 ENCOUNTER — ANTI-COAG VISIT (OUTPATIENT)
Dept: HEMATOLOGY/ONCOLOGY | Facility: HOSPITAL | Age: 50
End: 2020-11-25

## 2020-11-25 ENCOUNTER — TELEPHONE (OUTPATIENT)
Dept: HEMATOLOGY/ONCOLOGY | Facility: HOSPITAL | Age: 50
End: 2020-11-25

## 2020-11-25 DIAGNOSIS — D68.61 ANTIPHOSPHOLIPID ANTIBODY SYNDROME (HCC): ICD-10-CM

## 2020-11-30 ENCOUNTER — PATIENT MESSAGE (OUTPATIENT)
Dept: RHEUMATOLOGY | Facility: CLINIC | Age: 50
End: 2020-11-30

## 2020-11-30 NOTE — TELEPHONE ENCOUNTER
From: Jonathan Cosho[place]on  To:  Patel Mcnally DO  Sent: 11/30/2020 9:08 AM CST  Subject: Non-Urgent Medical Question    Good morning Dr Sydnie Navarro,   I woke up sick today with a very sore throat, very sore achy joints, no fever has ever been a problem, rodrigo

## 2020-12-30 ENCOUNTER — TELEPHONE (OUTPATIENT)
Dept: HEMATOLOGY/ONCOLOGY | Facility: HOSPITAL | Age: 50
End: 2020-12-30

## 2020-12-30 ENCOUNTER — ANTI-COAG VISIT (OUTPATIENT)
Dept: HEMATOLOGY/ONCOLOGY | Facility: HOSPITAL | Age: 50
End: 2020-12-30

## 2020-12-30 DIAGNOSIS — D68.61 ANTIPHOSPHOLIPID ANTIBODY SYNDROME (HCC): ICD-10-CM

## 2020-12-30 NOTE — TELEPHONE ENCOUNTER
LVM for patient. Dosing instructions provided. Requested call back to confirm. Contact information provided.

## 2020-12-30 NOTE — TELEPHONE ENCOUNTER
Denita called. She is returning a call from Agnieszka. She states she received the message that Agnieszka left.

## 2021-01-04 ENCOUNTER — ANTI-COAG VISIT (OUTPATIENT)
Dept: HEMATOLOGY/ONCOLOGY | Facility: HOSPITAL | Age: 51
End: 2021-01-04

## 2021-01-04 ENCOUNTER — TELEPHONE (OUTPATIENT)
Dept: HEMATOLOGY/ONCOLOGY | Facility: HOSPITAL | Age: 51
End: 2021-01-04

## 2021-01-04 DIAGNOSIS — D68.61 ANTIPHOSPHOLIPID ANTIBODY SYNDROME (HCC): ICD-10-CM

## 2021-01-04 LAB — INR: 2.1 (ref 0.8–1.2)

## 2021-01-24 LAB — INR: 2.2 (ref 0.8–1.2)

## 2021-01-25 ENCOUNTER — ANTI-COAG VISIT (OUTPATIENT)
Dept: HEMATOLOGY/ONCOLOGY | Facility: HOSPITAL | Age: 51
End: 2021-01-25

## 2021-01-25 DIAGNOSIS — D68.61 ANTIPHOSPHOLIPID ANTIBODY SYNDROME (HCC): ICD-10-CM

## 2021-02-04 ENCOUNTER — TELEMEDICINE (OUTPATIENT)
Dept: RHEUMATOLOGY | Facility: CLINIC | Age: 51
End: 2021-02-04
Payer: COMMERCIAL

## 2021-02-04 ENCOUNTER — APPOINTMENT (OUTPATIENT)
Dept: CT IMAGING | Age: 51
End: 2021-02-04
Attending: EMERGENCY MEDICINE
Payer: COMMERCIAL

## 2021-02-04 ENCOUNTER — HOSPITAL ENCOUNTER (EMERGENCY)
Age: 51
Discharge: HOME | End: 2021-02-04
Attending: EMERGENCY MEDICINE
Payer: COMMERCIAL

## 2021-02-04 ENCOUNTER — APPOINTMENT (OUTPATIENT)
Dept: GENERAL RADIOLOGY | Age: 51
End: 2021-02-04
Attending: EMERGENCY MEDICINE
Payer: COMMERCIAL

## 2021-02-04 VITALS
HEIGHT: 62 IN | RESPIRATION RATE: 16 BRPM | HEART RATE: 74 BPM | BODY MASS INDEX: 38.64 KG/M2 | TEMPERATURE: 98 F | DIASTOLIC BLOOD PRESSURE: 71 MMHG | SYSTOLIC BLOOD PRESSURE: 120 MMHG | OXYGEN SATURATION: 98 % | WEIGHT: 210 LBS

## 2021-02-04 VITALS — HEIGHT: 62 IN | WEIGHT: 210 LBS | BODY MASS INDEX: 38.64 KG/M2

## 2021-02-04 DIAGNOSIS — R06.02 SHORTNESS OF BREATH: ICD-10-CM

## 2021-02-04 DIAGNOSIS — D68.61 ANTIPHOSPHOLIPID ANTIBODY SYNDROME (HCC): ICD-10-CM

## 2021-02-04 DIAGNOSIS — M25.50 POLYARTHRALGIA: ICD-10-CM

## 2021-02-04 DIAGNOSIS — R76.8 DS DNA ANTIBODY POSITIVE: ICD-10-CM

## 2021-02-04 DIAGNOSIS — Z87.39 HISTORY OF RHEUMATOID ARTHRITIS: ICD-10-CM

## 2021-02-04 DIAGNOSIS — J98.01 BRONCHOSPASM: ICD-10-CM

## 2021-02-04 DIAGNOSIS — J40 BRONCHITIS: Primary | ICD-10-CM

## 2021-02-04 DIAGNOSIS — Z79.899 HIGH RISK MEDICATION USE: ICD-10-CM

## 2021-02-04 DIAGNOSIS — M32.19 OTHER SYSTEMIC LUPUS ERYTHEMATOSUS WITH OTHER ORGAN INVOLVEMENT (HCC): Primary | ICD-10-CM

## 2021-02-04 LAB
ALBUMIN SERPL-MCNC: 3.7 G/DL (ref 3.4–5)
ALBUMIN/GLOB SERPL: 1.1 {RATIO} (ref 1–2)
ALP LIVER SERPL-CCNC: 81 U/L
ALT SERPL-CCNC: 19 U/L
ANION GAP SERPL CALC-SCNC: 6 MMOL/L (ref 0–18)
AST SERPL-CCNC: 12 U/L (ref 15–37)
ATRIAL RATE: 85 BPM
BASOPHILS # BLD AUTO: 0.02 X10(3) UL (ref 0–0.2)
BASOPHILS NFR BLD AUTO: 0.2 %
BILIRUB SERPL-MCNC: 0.3 MG/DL (ref 0.1–2)
BUN BLD-MCNC: 22 MG/DL (ref 7–18)
BUN/CREAT SERPL: 24.2 (ref 10–20)
CALCIUM BLD-MCNC: 9.1 MG/DL (ref 8.5–10.1)
CHLORIDE SERPL-SCNC: 113 MMOL/L (ref 98–112)
CO2 SERPL-SCNC: 22 MMOL/L (ref 21–32)
CREAT BLD-MCNC: 0.91 MG/DL
D-DIMER: 0.7 UG/ML FEU (ref ?–0.5)
DEPRECATED RDW RBC AUTO: 42.2 FL (ref 35.1–46.3)
EOSINOPHIL # BLD AUTO: 0 X10(3) UL (ref 0–0.7)
EOSINOPHIL NFR BLD AUTO: 0 %
ERYTHROCYTE [DISTWIDTH] IN BLOOD BY AUTOMATED COUNT: 13 % (ref 11–15)
GLOBULIN PLAS-MCNC: 3.4 G/DL (ref 2.8–4.4)
GLUCOSE BLD-MCNC: 90 MG/DL (ref 70–99)
HCT VFR BLD AUTO: 43.6 %
HGB BLD-MCNC: 14.2 G/DL
IMM GRANULOCYTES # BLD AUTO: 0.04 X10(3) UL (ref 0–1)
IMM GRANULOCYTES NFR BLD: 0.3 %
INR BLD: 1.66 (ref 0.88–1.11)
LYMPHOCYTES # BLD AUTO: 1.17 X10(3) UL (ref 1–4)
LYMPHOCYTES NFR BLD AUTO: 9 %
M PROTEIN MFR SERPL ELPH: 7.1 G/DL (ref 6.4–8.2)
MCH RBC QN AUTO: 29 PG (ref 26–34)
MCHC RBC AUTO-ENTMCNC: 32.6 G/DL (ref 31–37)
MCV RBC AUTO: 89.2 FL
MONOCYTES # BLD AUTO: 0.86 X10(3) UL (ref 0.1–1)
MONOCYTES NFR BLD AUTO: 6.6 %
NEUTROPHILS # BLD AUTO: 10.97 X10 (3) UL (ref 1.5–7.7)
NEUTROPHILS # BLD AUTO: 10.97 X10(3) UL (ref 1.5–7.7)
NEUTROPHILS NFR BLD AUTO: 83.9 %
OSMOLALITY SERPL CALC.SUM OF ELEC: 295 MOSM/KG (ref 275–295)
P AXIS: 43 DEGREES
P-R INTERVAL: 150 MS
PLATELET # BLD AUTO: 253 10(3)UL (ref 150–450)
POTASSIUM SERPL-SCNC: 3.7 MMOL/L (ref 3.5–5.1)
PSA SERPL DL<=0.01 NG/ML-MCNC: 19.5 SECONDS (ref 12–14.3)
Q-T INTERVAL: 362 MS
QRS DURATION: 82 MS
QTC CALCULATION (BEZET): 430 MS
R AXIS: 28 DEGREES
RBC # BLD AUTO: 4.89 X10(6)UL
SARS-COV-2 RNA RESP QL NAA+PROBE: NOT DETECTED
SODIUM SERPL-SCNC: 141 MMOL/L (ref 136–145)
T AXIS: 14 DEGREES
TROPONIN I SERPL-MCNC: <0.045 NG/ML (ref ?–0.04)
VENTRICULAR RATE: 85 BPM
WBC # BLD AUTO: 13.1 X10(3) UL (ref 4–11)

## 2021-02-04 PROCEDURE — 85610 PROTHROMBIN TIME: CPT | Performed by: EMERGENCY MEDICINE

## 2021-02-04 PROCEDURE — 85379 FIBRIN DEGRADATION QUANT: CPT | Performed by: EMERGENCY MEDICINE

## 2021-02-04 PROCEDURE — 96361 HYDRATE IV INFUSION ADD-ON: CPT

## 2021-02-04 PROCEDURE — 99285 EMERGENCY DEPT VISIT HI MDM: CPT

## 2021-02-04 PROCEDURE — 93005 ELECTROCARDIOGRAM TRACING: CPT

## 2021-02-04 PROCEDURE — 93010 ELECTROCARDIOGRAM REPORT: CPT

## 2021-02-04 PROCEDURE — 71045 X-RAY EXAM CHEST 1 VIEW: CPT | Performed by: EMERGENCY MEDICINE

## 2021-02-04 PROCEDURE — 96374 THER/PROPH/DIAG INJ IV PUSH: CPT

## 2021-02-04 PROCEDURE — 84484 ASSAY OF TROPONIN QUANT: CPT | Performed by: EMERGENCY MEDICINE

## 2021-02-04 PROCEDURE — 3008F BODY MASS INDEX DOCD: CPT | Performed by: INTERNAL MEDICINE

## 2021-02-04 PROCEDURE — 80053 COMPREHEN METABOLIC PANEL: CPT | Performed by: EMERGENCY MEDICINE

## 2021-02-04 PROCEDURE — 99214 OFFICE O/P EST MOD 30 MIN: CPT | Performed by: INTERNAL MEDICINE

## 2021-02-04 PROCEDURE — 85025 COMPLETE CBC W/AUTO DIFF WBC: CPT | Performed by: EMERGENCY MEDICINE

## 2021-02-04 PROCEDURE — 71275 CT ANGIOGRAPHY CHEST: CPT | Performed by: EMERGENCY MEDICINE

## 2021-02-04 RX ORDER — DOXYCYCLINE HYCLATE 100 MG
TABLET ORAL
COMMUNITY

## 2021-02-04 RX ORDER — ALBUTEROL SULFATE 90 UG/1
8 AEROSOL, METERED RESPIRATORY (INHALATION) ONCE
Status: COMPLETED | OUTPATIENT
Start: 2021-02-04 | End: 2021-02-04

## 2021-02-04 RX ORDER — SODIUM CHLORIDE 9 MG/ML
INJECTION, SOLUTION INTRAVENOUS CONTINUOUS
Status: DISCONTINUED | OUTPATIENT
Start: 2021-02-04 | End: 2021-02-04

## 2021-02-04 RX ORDER — METHYLPREDNISOLONE SODIUM SUCCINATE 125 MG/2ML
125 INJECTION, POWDER, LYOPHILIZED, FOR SOLUTION INTRAMUSCULAR; INTRAVENOUS ONCE
Status: COMPLETED | OUTPATIENT
Start: 2021-02-04 | End: 2021-02-04

## 2021-02-04 RX ORDER — ALBUTEROL SULFATE 2.5 MG/3ML
2.5 SOLUTION RESPIRATORY (INHALATION) EVERY 4 HOURS PRN
Qty: 30 AMPULE | Refills: 0 | Status: SHIPPED | OUTPATIENT
Start: 2021-02-04 | End: 2021-03-06

## 2021-02-04 RX ORDER — PREDNISONE 20 MG/1
40 TABLET ORAL DAILY
Qty: 10 TABLET | Refills: 0 | Status: SHIPPED | OUTPATIENT
Start: 2021-02-04 | End: 2021-02-09

## 2021-02-04 NOTE — ED INITIAL ASSESSMENT (HPI)
Chest pain, cough and chills , lightheadness, runny nose , sneezing onset 3.5 wks ago.  Has had four neg covid test and neg flu test.

## 2021-02-04 NOTE — PROGRESS NOTES
[facility] TeleHealth Audio and Visual Visit   In- Office visit canceled due to coronavirus pandemic and pt having pending COVID test.     This was an audio/video conversation using [employer] in lieu of an in-person visit due to need to limit pers patient/family/caregiver and care coordination with the patient's other providers. RHEUMATOLOGY Follow up   Date of visit: 2/4/2021  ? Patient presents with:  SLE: 4 month f/u. Right now pt is sick. Tested neg for covid and flu.  Pt is on antibiotic and Norco, however I will defer further refills to her PCP. I do not treat fibromyalgia with opioids. However if patient's PCP believes this is necessary, I will defer to him. Unfortunately she had worsened symptoms so medication was stopped.  we were go is seen for medically necessary follow-up today. She was seen as a new patient a few weeks ago for evaluation for transfer of care after recently moving to the area. She has an extensive history of lupus, rheumatoid arthritis, DVT with APS on coumadin. in [place]. States was due for another infusion, but moved locally. Hx of antiphospholipid syndrome, states diagnosed last year. Hematologist checked blood tests in [place], was positive for APLs. Hx of right leg DVTs.  Denies hx of PE.      Hx of right total except possible fatty liver.      Does take Norco 10-325mg, takes about twice daily right now.    ?  Past Medical History:  Past Medical History:   Diagnosis Date   • DVT, recurrent, lower extremity, acute, right ([address]    • Fatty liver    • Fibromyalgia times daily. , Disp: , Rfl:     •  atorvastatin 10 MG Oral Tab, Take 10 mg by mouth nightly., Disp: , Rfl:     •  lisinopril 10 MG Oral Tab, Take 10 mg by mouth daily. , Disp: , Rfl:     •  PREDNISONE 10 MG Oral Tab, Take 10 mg by mouth daily.  Three times a Neurological: Positive for dizziness, tingling, weakness and headaches. Negative for seizures. Endo/Heme/Allergies: Bruises/bleeds easily. Psychiatric/Behavioral: Positive for depression. The patient is nervous/anxious.  The patient does not have inso Component Value Date    WBC 8.7 09/02/2020    RBC 4.55 09/02/2020    HGB 13.0 09/02/2020    HCT 40.9 09/02/2020    .0 09/02/2020    MCV 89.9 09/02/2020    MCH 28.6 09/02/2020    MCHC 31.8 09/02/2020    RDW 12.7 09/02/2020    NEPRELIM 7.23 09/02/20

## 2021-02-04 NOTE — ED PROVIDER NOTES
Patient Seen in: THE [facility] Emergency Department In [place]      History   Patient presents with:  Chest Pain Angina  Cough/URI  Difficulty Breathing    Stated Complaint: Cough, CP, SOB, chills x 3.5 weeks    HPI/Subjective:   HPI    Patient with a histor Smokeless tobacco: Never Used    Alcohol use: Never      Frequency: Never    Drug use: Not on file             Review of Systems    Positive for stated complaint: Cough, CP, SOB, chills x 3.5 weeks  Other systems are as noted in HPI.   Constitutional and vi (*)     BUN 22 (*)     BUN/CREA Ratio 24.2 (*)     AST 12 (*)     All other components within normal limits   D-DIMER - Abnormal; Notable for the following components:    D-Dimer 0.70 (*)     All other components within normal limits   CBC W/ DIFFERENTIAL panel shows normal glucose and electrolytes. Creatinine normal.  LFTs normal  Troponin negative  INR subtherapeutic 1.66  Rapid Covid negative  D-dimer elevated prompting CTA chest    Chest x-ray   CONCLUSION:     1.  There are small bilateral pleural effu MG/3ML) 0.083% Inhalation Nebu Soln  Take 3 mL (2.5 mg total) by nebulization every 4 (four) hours as needed for Wheezing or Shortness of Breath. Qty: 30 ampule Refills: 0    !! - Potential duplicate medications found. Please discuss with provider.

## 2021-02-05 ENCOUNTER — PATIENT MESSAGE (OUTPATIENT)
Dept: RHEUMATOLOGY | Facility: CLINIC | Age: 51
End: 2021-02-05

## 2021-02-05 ENCOUNTER — ANTI-COAG VISIT (OUTPATIENT)
Dept: HEMATOLOGY/ONCOLOGY | Facility: HOSPITAL | Age: 51
End: 2021-02-05

## 2021-02-05 DIAGNOSIS — M32.19 OTHER SYSTEMIC LUPUS ERYTHEMATOSUS WITH OTHER ORGAN INVOLVEMENT (HCC): Primary | ICD-10-CM

## 2021-02-05 DIAGNOSIS — D68.61 ANTIPHOSPHOLIPID ANTIBODY SYNDROME (HCC): ICD-10-CM

## 2021-02-05 RX ORDER — PREDNISONE 10 MG/1
10 TABLET ORAL DAILY
Qty: 180 TABLET | Refills: 0 | Status: SHIPPED | OUTPATIENT
Start: 2021-02-05 | End: 2022-01-24

## 2021-02-08 ENCOUNTER — ANTI-COAG VISIT (OUTPATIENT)
Dept: HEMATOLOGY/ONCOLOGY | Facility: HOSPITAL | Age: 51
End: 2021-02-08

## 2021-02-08 DIAGNOSIS — D68.61 ANTIPHOSPHOLIPID ANTIBODY SYNDROME (HCC): ICD-10-CM

## 2021-02-08 LAB — INR: 4 (ref 0.8–1.2)

## 2021-02-09 ENCOUNTER — TELEPHONE (OUTPATIENT)
Dept: RHEUMATOLOGY | Facility: CLINIC | Age: 51
End: 2021-02-09

## 2021-02-09 NOTE — TELEPHONE ENCOUNTER
Clarified prednisone orders in office with Dr. Devon Lopez. Pt on tapering dose since leaving the ER days ago. Phoned [facility], and clarified directions for use. Voiced understanding.

## 2021-02-09 NOTE — TELEPHONE ENCOUNTER
Ariadna phoned office to clarify orders for prednisone. Pt was taking 20mg daily, would you like her to increase to 30mg? Please advise.

## 2021-02-12 ENCOUNTER — ANTI-COAG VISIT (OUTPATIENT)
Dept: HEMATOLOGY/ONCOLOGY | Facility: HOSPITAL | Age: 51
End: 2021-02-12

## 2021-02-12 DIAGNOSIS — D68.61 ANTIPHOSPHOLIPID ANTIBODY SYNDROME (HCC): ICD-10-CM

## 2021-02-12 LAB — INR: 5 (ref 0.8–1.2)

## 2021-02-15 ENCOUNTER — ANTI-COAG VISIT (OUTPATIENT)
Dept: HEMATOLOGY/ONCOLOGY | Facility: HOSPITAL | Age: 51
End: 2021-02-15

## 2021-02-15 DIAGNOSIS — D68.61 ANTIPHOSPHOLIPID ANTIBODY SYNDROME (HCC): ICD-10-CM

## 2021-02-15 LAB — INR: 3.3 (ref 0.8–1.2)

## 2021-02-25 NOTE — PROGRESS NOTES
THE [facility] Hematology and Oncology Clinic Note    Diagnosis:   Recurrent DVT  Possible APLS    Treatment History:   Coumadin     Visit Diagnosis:  Antiphospholipid antibody syndrome ([address]  (primary encounter diagnosis)  Other systemic lupus erythematosus with coumadin. She was diagnosed with APLS in [place] and received Rituxan for possible RA/Lupus overlap syndrome. She has been on coumadin since around 2019. APLS testing on on 5/14/20 was negative for LA, B2GP and Cardiolipins.  She met with Dr. Ceci Adorno on 8/24/ twice a day by oral route., Disp: , Rfl:     •  albuterol sulfate (2.5 MG/3ML) 0.083% Inhalation Nebu Soln, Take 3 mL (2.5 mg total) by nebulization every 4 (four) hours as needed for Wheezing or Shortness of Breath., Disp: 30 ampule, Rfl: 0    •  Warfarin [] predniSONE 20 MG Oral Tab, Take 2 tablets (40 mg total) by mouth daily for 5 days. , Disp: 10 tablet, Rfl: 0      •  [COMPLETED] Albuterol Sulfate  (90 Base) MCG/ACT inhaler 8 puff, 8 puff, Inhalation, Once, Madalyn Mari MD, 8 puff at 02 97.7 °F (36.5 °C) (03/01 1145)  Do Not Use - Resp Rate: --  SpO2: 100 % (03/01 1145)     General: NAD, AOX3  HEENT: clear op, mmm, no jvd, no scleral icterus  LN: no palpable supra/infraclavicular, clavicular or axillary LAD  CV: RRR  Extremities: No edema at age 49    RTC in 3-6 months.      [facility]

## 2021-03-01 ENCOUNTER — OFFICE VISIT (OUTPATIENT)
Dept: RHEUMATOLOGY | Facility: CLINIC | Age: 51
End: 2021-03-01
Payer: COMMERCIAL

## 2021-03-01 ENCOUNTER — OFFICE VISIT (OUTPATIENT)
Dept: HEMATOLOGY/ONCOLOGY | Age: 51
End: 2021-03-01
Attending: INTERNAL MEDICINE
Payer: COMMERCIAL

## 2021-03-01 ENCOUNTER — ANTI-COAG VISIT (OUTPATIENT)
Dept: HEMATOLOGY/ONCOLOGY | Facility: HOSPITAL | Age: 51
End: 2021-03-01

## 2021-03-01 VITALS
SYSTOLIC BLOOD PRESSURE: 120 MMHG | RESPIRATION RATE: 18 BRPM | DIASTOLIC BLOOD PRESSURE: 72 MMHG | HEIGHT: 62 IN | WEIGHT: 226 LBS | BODY MASS INDEX: 41.59 KG/M2 | HEART RATE: 84 BPM | TEMPERATURE: 98 F

## 2021-03-01 VITALS
SYSTOLIC BLOOD PRESSURE: 111 MMHG | OXYGEN SATURATION: 100 % | HEIGHT: 62.01 IN | BODY MASS INDEX: 40.67 KG/M2 | TEMPERATURE: 98 F | DIASTOLIC BLOOD PRESSURE: 76 MMHG | RESPIRATION RATE: 18 BRPM | WEIGHT: 223.81 LBS | HEART RATE: 60 BPM

## 2021-03-01 DIAGNOSIS — R53.82 CHRONIC FATIGUE: ICD-10-CM

## 2021-03-01 DIAGNOSIS — M25.50 POLYARTHRALGIA: ICD-10-CM

## 2021-03-01 DIAGNOSIS — Z12.31 SCREENING MAMMOGRAM, ENCOUNTER FOR: ICD-10-CM

## 2021-03-01 DIAGNOSIS — D68.61 ANTIPHOSPHOLIPID ANTIBODY SYNDROME (HCC): ICD-10-CM

## 2021-03-01 DIAGNOSIS — R60.9 NON-PITTING EDEMA: ICD-10-CM

## 2021-03-01 DIAGNOSIS — M32.19 OTHER SYSTEMIC LUPUS ERYTHEMATOSUS WITH OTHER ORGAN INVOLVEMENT (HCC): ICD-10-CM

## 2021-03-01 DIAGNOSIS — R76.8 DS DNA ANTIBODY POSITIVE: ICD-10-CM

## 2021-03-01 DIAGNOSIS — R79.82 ELEVATED C-REACTIVE PROTEIN (CRP): ICD-10-CM

## 2021-03-01 DIAGNOSIS — Z79.899 HIGH RISK MEDICATION USE: ICD-10-CM

## 2021-03-01 DIAGNOSIS — R76.8 SS-A ANTIBODY POSITIVE: ICD-10-CM

## 2021-03-01 DIAGNOSIS — D68.61 ANTIPHOSPHOLIPID ANTIBODY SYNDROME (HCC): Primary | ICD-10-CM

## 2021-03-01 DIAGNOSIS — M05.741 RHEUMATOID ARTHRITIS INVOLVING BOTH HANDS WITH POSITIVE RHEUMATOID FACTOR (HCC): ICD-10-CM

## 2021-03-01 DIAGNOSIS — M05.742 RHEUMATOID ARTHRITIS INVOLVING BOTH HANDS WITH POSITIVE RHEUMATOID FACTOR (HCC): ICD-10-CM

## 2021-03-01 DIAGNOSIS — M32.19 OTHER SYSTEMIC LUPUS ERYTHEMATOSUS WITH OTHER ORGAN INVOLVEMENT (HCC): Primary | ICD-10-CM

## 2021-03-01 LAB
ALBUMIN SERPL-MCNC: 3.8 G/DL (ref 3.4–5)
ALBUMIN/GLOB SERPL: 1.2 {RATIO} (ref 1–2)
ALP LIVER SERPL-CCNC: 79 U/L
ALT SERPL-CCNC: 22 U/L
ANION GAP SERPL CALC-SCNC: 6 MMOL/L (ref 0–18)
AST SERPL-CCNC: 15 U/L (ref 15–37)
BASOPHILS # BLD AUTO: 0.02 X10(3) UL (ref 0–0.2)
BASOPHILS NFR BLD AUTO: 0.2 %
BILIRUB SERPL-MCNC: 0.6 MG/DL (ref 0.1–2)
BUN BLD-MCNC: 21 MG/DL (ref 7–18)
BUN/CREAT SERPL: 17.9 (ref 10–20)
C3 SERPL-MCNC: 128 MG/DL (ref 90–180)
C4 SERPL-MCNC: 29.5 MG/DL (ref 10–40)
CALCIUM BLD-MCNC: 8.9 MG/DL (ref 8.5–10.1)
CHLORIDE SERPL-SCNC: 106 MMOL/L (ref 98–112)
CO2 SERPL-SCNC: 28 MMOL/L (ref 21–32)
CREAT BLD-MCNC: 1.17 MG/DL
CRP SERPL-MCNC: 1.72 MG/DL (ref ?–0.3)
DEPRECATED RDW RBC AUTO: 45.5 FL (ref 35.1–46.3)
EOSINOPHIL # BLD AUTO: 0.04 X10(3) UL (ref 0–0.7)
EOSINOPHIL NFR BLD AUTO: 0.3 %
ERYTHROCYTE [DISTWIDTH] IN BLOOD BY AUTOMATED COUNT: 13.4 % (ref 11–15)
GLOBULIN PLAS-MCNC: 3.3 G/DL (ref 2.8–4.4)
GLUCOSE BLD-MCNC: 98 MG/DL (ref 70–99)
HCT VFR BLD AUTO: 42.1 %
HGB BLD-MCNC: 13.5 G/DL
IMM GRANULOCYTES # BLD AUTO: 0.06 X10(3) UL (ref 0–1)
IMM GRANULOCYTES NFR BLD: 0.5 %
INR BLD: 1.56 (ref 0.88–1.11)
LYMPHOCYTES # BLD AUTO: 0.93 X10(3) UL (ref 1–4)
LYMPHOCYTES NFR BLD AUTO: 7.7 %
M PROTEIN MFR SERPL ELPH: 7.1 G/DL (ref 6.4–8.2)
MCH RBC QN AUTO: 29.8 PG (ref 26–34)
MCHC RBC AUTO-ENTMCNC: 32.1 G/DL (ref 31–37)
MCV RBC AUTO: 92.9 FL
MONOCYTES # BLD AUTO: 0.42 X10(3) UL (ref 0.1–1)
MONOCYTES NFR BLD AUTO: 3.5 %
NEUTROPHILS # BLD AUTO: 10.59 X10 (3) UL (ref 1.5–7.7)
NEUTROPHILS # BLD AUTO: 10.59 X10(3) UL (ref 1.5–7.7)
NEUTROPHILS NFR BLD AUTO: 87.8 %
OSMOLALITY SERPL CALC.SUM OF ELEC: 293 MOSM/KG (ref 275–295)
PATIENT FASTING Y/N/NP: NO
PLATELET # BLD AUTO: 208 10(3)UL (ref 150–450)
POTASSIUM SERPL-SCNC: 3.6 MMOL/L (ref 3.5–5.1)
PSA SERPL DL<=0.01 NG/ML-MCNC: 18.6 SECONDS (ref 12–14.3)
RBC # BLD AUTO: 4.53 X10(6)UL
SED RATE-ML: 10 MM/HR
SODIUM SERPL-SCNC: 140 MMOL/L (ref 136–145)
T4 FREE SERPL-MCNC: 0.9 NG/DL (ref 0.8–1.7)
TSI SER-ACNC: 1.37 MIU/ML (ref 0.36–3.74)
WBC # BLD AUTO: 12.1 X10(3) UL (ref 4–11)

## 2021-03-01 PROCEDURE — 99215 OFFICE O/P EST HI 40 MIN: CPT | Performed by: INTERNAL MEDICINE

## 2021-03-01 PROCEDURE — 3074F SYST BP LT 130 MM HG: CPT | Performed by: INTERNAL MEDICINE

## 2021-03-01 PROCEDURE — 3078F DIAST BP <80 MM HG: CPT | Performed by: INTERNAL MEDICINE

## 2021-03-01 PROCEDURE — 3008F BODY MASS INDEX DOCD: CPT | Performed by: INTERNAL MEDICINE

## 2021-03-01 PROCEDURE — 99214 OFFICE O/P EST MOD 30 MIN: CPT | Performed by: INTERNAL MEDICINE

## 2021-03-01 NOTE — PROGRESS NOTES
RHEUMATOLOGY Follow up   Date of visit: 3/1/2021  ? Patient presents with:  SLE: previous video visit. Had breathing issues last visit, went to ER and diagnosed with bronchitis. Feeling better after prednisone taper. Having more deformities in hands.  Lo hands as well as some worsened deformity that would require intervention. Recommended that she continue follow-up with her PCP regarding her fibromyalgia treatment. Also encouraged her to get updated labs that were ordered previously.   She is planning Standing    Antiphospholipid antibody syndrome (HCC)    Rheumatoid arthritis involving both hands with positive rheumatoid factor (HCC)  -     CBC WITH DIFFERENTIAL WITH PLATELET; Standing  -     COMP METABOLIC PANEL (14); Standing  -     SED RATE, WESTERG pain in the joints of her hands and notices turning of the fingers. Has a flexion deformities of the left hand. Tried oral methotrexate but had significant nausea, but felt like it was helping her pain. Currently on prednisone 10mg daily.    Has plans t Also diagnosed with fibromyalgia based off \"tests\"      Currently, she feels well overall but has some bad days.   + states fatigues easily  + increased stress due to moving  + increased stress due to daughter with cerebral palsy, in her late 25s and in a Family History:  Family History   Problem Relation Age of Onset   • Breast Cancer Maternal Aunt 39   • Breast Cancer Paternal Aunt 61   • Ovarian Cancer Paternal Aunt 76   • Cancer Father    • Cancer Mother    • Other (cerebral palsey) Daughter    • Ot before breakfast., Disp: , Rfl:       Modified Medications    No medications on file     There are no discontinued medications.   ?  ?  Allergies:    Bactrim [Sulfametho*    HIVES, NAUSEA ONLY  Penicillins             HIVES  Sulfa Antibiotics       HIVES  T EOM are normal. No scleral icterus. Neck: No JVD present. No tracheal deviation present. Cardiovascular: Normal rate, regular rhythm, normal heart sounds and intact distal pulses. No murmur heard.   Pulmonary/Chest: Effort normal and breath sounds nor ([address]) which includes the Dose Index Registry. PATIENT STATED HISTORY:(As transcribed by Technologist)  Patient complain of flu-like symptoms with sob and right side chest pain for (3)weeks, hx of Lupus.       CONTRAST USED: 12.1 (H) 03/01/2021    RBC 4.53 03/01/2021    HGB 13.5 03/01/2021    HCT 42.1 03/01/2021    .0 03/01/2021    MCV 92.9 03/01/2021    MCH 29.8 03/01/2021    MCHC 32.1 03/01/2021    RDW 13.4 03/01/2021    NEPRELIM 10.59 (H) 03/01/2021    NEPERCENT 87. 8

## 2021-03-02 ENCOUNTER — TELEPHONE (OUTPATIENT)
Dept: RHEUMATOLOGY | Facility: CLINIC | Age: 51
End: 2021-03-02

## 2021-03-02 LAB
APTT PPP: 36.7 SECONDS (ref 25.4–36.1)
BETA 2 GLYCOPROTEIN 1 AB, IGG: 1 U/ML (ref ?–7)
BETA 2 GLYCOPROTEIN 1 AB, IGM: <2.9 U/ML (ref ?–7)
CARDIOLIPIN IGG SERPL-ACNC: 1 GPL (ref ?–10)
CARDIOLIPIN IGM SERPL-ACNC: <0.8 MPL (ref ?–10)
DRVVT LUPUS ANTICOAGULANT: NEGATIVE
DRVVT SCREEN RATIO: 0.88 (ref 0–1.29)
PT(LUPUS): 19.5 SECONDS (ref 12.4–14.6)
STACLOT LA DELTA: 3.9 SECONDS (ref ?–8)
STACLOT LA: NEGATIVE

## 2021-03-08 ENCOUNTER — ANTI-COAG VISIT (OUTPATIENT)
Dept: HEMATOLOGY/ONCOLOGY | Facility: HOSPITAL | Age: 51
End: 2021-03-08

## 2021-03-08 DIAGNOSIS — D68.61 ANTIPHOSPHOLIPID ANTIBODY SYNDROME (HCC): ICD-10-CM

## 2021-03-08 LAB — INR: 2.5 (ref 0.8–1.2)

## 2021-03-12 ENCOUNTER — TELEPHONE (OUTPATIENT)
Dept: RHEUMATOLOGY | Facility: CLINIC | Age: 51
End: 2021-03-12

## 2021-03-16 ENCOUNTER — TELEPHONE (OUTPATIENT)
Dept: RHEUMATOLOGY | Facility: CLINIC | Age: 51
End: 2021-03-16

## 2021-03-24 ENCOUNTER — TELEPHONE (OUTPATIENT)
Dept: RHEUMATOLOGY | Facility: CLINIC | Age: 51
End: 2021-03-24

## 2021-03-24 NOTE — TELEPHONE ENCOUNTER
[employer] copay card only covers $125/mo oleaving out of pocket $374.96. Prescription transferred to [address] [place] [address]. This Rn completed patient assistance form and faxed to [address] [place] [address],[address]. Mailed patient portion to her home address to complete.  LVM for p

## 2021-03-30 ENCOUNTER — HOSPITAL ENCOUNTER (EMERGENCY)
Age: 51
Discharge: HOME | End: 2021-03-30
Attending: EMERGENCY MEDICINE
Payer: COMMERCIAL

## 2021-03-30 ENCOUNTER — APPOINTMENT (OUTPATIENT)
Dept: ULTRASOUND IMAGING | Age: 51
End: 2021-03-30
Attending: EMERGENCY MEDICINE
Payer: COMMERCIAL

## 2021-03-30 VITALS
TEMPERATURE: 98 F | RESPIRATION RATE: 20 BRPM | SYSTOLIC BLOOD PRESSURE: 128 MMHG | OXYGEN SATURATION: 100 % | DIASTOLIC BLOOD PRESSURE: 52 MMHG | WEIGHT: 223.75 LBS | HEART RATE: 72 BPM | BODY MASS INDEX: 41 KG/M2

## 2021-03-30 DIAGNOSIS — R79.1 SUBTHERAPEUTIC INTERNATIONAL NORMALIZED RATIO (INR): ICD-10-CM

## 2021-03-30 DIAGNOSIS — M79.651 RIGHT THIGH PAIN: Primary | ICD-10-CM

## 2021-03-30 PROCEDURE — 85025 COMPLETE CBC W/AUTO DIFF WBC: CPT | Performed by: EMERGENCY MEDICINE

## 2021-03-30 PROCEDURE — 36415 COLL VENOUS BLD VENIPUNCTURE: CPT

## 2021-03-30 PROCEDURE — 93971 EXTREMITY STUDY: CPT | Performed by: EMERGENCY MEDICINE

## 2021-03-30 PROCEDURE — 99284 EMERGENCY DEPT VISIT MOD MDM: CPT

## 2021-03-30 PROCEDURE — 80053 COMPREHEN METABOLIC PANEL: CPT | Performed by: EMERGENCY MEDICINE

## 2021-03-30 PROCEDURE — 85610 PROTHROMBIN TIME: CPT | Performed by: EMERGENCY MEDICINE

## 2021-03-30 NOTE — ED PROVIDER NOTES
Patient Seen in: [facility] [place]      History   Patient presents with:  Deep Vein Thrombosis    Stated Complaint: pain to right thigh pain h/o DVT    HPI/Subjective:   HPI      Patient is a pleasant 64-year-old female, with mult and alert, resting comfortably on the cart, in no apparent distress. HEENT: Head is without evidence of trauma. Extraocular muscles are intact. NECK: Neck is supple. The trachea is midline. LUNGS: Lungs are clear, respirations are unlabored.    HEART: FINDINGS:  EXTREMITY EXAMINED:  Right SAPHENOFEMORAL JUNCTION:  No reflux. THROMBI:  None visible. COMPRESSION:  Normal compressibility, phasicity, and augmentation. OTHER:  Negative. CONCLUSION:  No DVT.     Dictated by (CST): Baby Signs

## 2021-03-30 NOTE — ED INITIAL ASSESSMENT (HPI)
Pt c/o r upper leg pain for the past two days. Denies cp or sob. Denies recent long car or plane rides.

## 2021-04-02 ENCOUNTER — HOSPITAL ENCOUNTER (OUTPATIENT)
Dept: ULTRASOUND IMAGING | Age: 51
Discharge: HOME | End: 2021-04-02
Attending: INTERNAL MEDICINE
Payer: COMMERCIAL

## 2021-04-02 DIAGNOSIS — D68.61 ANTIPHOSPHOLIPID ANTIBODY SYNDROME (HCC): ICD-10-CM

## 2021-04-02 PROCEDURE — 93970 EXTREMITY STUDY: CPT | Performed by: INTERNAL MEDICINE

## 2021-04-15 ENCOUNTER — ANTI-COAG VISIT (OUTPATIENT)
Dept: HEMATOLOGY/ONCOLOGY | Facility: HOSPITAL | Age: 51
End: 2021-04-15

## 2021-04-15 DIAGNOSIS — D68.61 ANTIPHOSPHOLIPID ANTIBODY SYNDROME (HCC): ICD-10-CM

## 2021-04-28 ENCOUNTER — PATIENT MESSAGE (OUTPATIENT)
Dept: RHEUMATOLOGY | Facility: CLINIC | Age: 51
End: 2021-04-28

## 2021-04-28 NOTE — TELEPHONE ENCOUNTER
Pt called to review \"injectable methotrexate\". Informed her we were working on PA for [employer], similar to but not \"injectable methotrexate\". She will check into this with her insurance and let us know what she finds.   Also stated she received clearanc

## 2021-05-14 ENCOUNTER — TELEPHONE (OUTPATIENT)
Dept: RHEUMATOLOGY | Facility: CLINIC | Age: 51
End: 2021-05-14

## 2021-05-14 NOTE — TELEPHONE ENCOUNTER
Note faxed from [facility] eye care    Consulting optometrist Canelo Jha.  hydroxychloroquine (plaquenil) eye exam.  Visual acuity OU 20/20  Fundus exam normal  Macular visual field testing normal  OCT normal

## 2021-05-18 ENCOUNTER — TELEPHONE (OUTPATIENT)
Dept: RHEUMATOLOGY | Facility: CLINIC | Age: 51
End: 2021-05-18

## 2021-05-18 NOTE — TELEPHONE ENCOUNTER
[employer] assistance program form has no pt signature on it. Faxed it here on 4/21/21; confirmed they had the wrong fax number. Corrected this for them. They will reach out directly to the pt; RN confirmed her phone number is correct.

## 2021-05-19 ENCOUNTER — TELEPHONE (OUTPATIENT)
Dept: RHEUMATOLOGY | Facility: CLINIC | Age: 51
End: 2021-05-19

## 2021-05-19 NOTE — TELEPHONE ENCOUNTER
Form from [facility]- [address] [facility] Patient assistance completed and faxed to [phone].

## 2021-05-28 ENCOUNTER — ANTI-COAG VISIT (OUTPATIENT)
Dept: HEMATOLOGY/ONCOLOGY | Facility: HOSPITAL | Age: 51
End: 2021-05-28

## 2021-05-28 DIAGNOSIS — D68.61 ANTIPHOSPHOLIPID ANTIBODY SYNDROME (HCC): ICD-10-CM

## 2021-06-18 ENCOUNTER — ANTI-COAG VISIT (OUTPATIENT)
Dept: HEMATOLOGY/ONCOLOGY | Facility: HOSPITAL | Age: 51
End: 2021-06-18

## 2021-06-18 DIAGNOSIS — D68.61 ANTIPHOSPHOLIPID ANTIBODY SYNDROME (HCC): ICD-10-CM

## 2021-06-25 ENCOUNTER — ANTI-COAG VISIT (OUTPATIENT)
Dept: HEMATOLOGY/ONCOLOGY | Facility: HOSPITAL | Age: 51
End: 2021-06-25

## 2021-06-25 DIAGNOSIS — D68.61 ANTIPHOSPHOLIPID ANTIBODY SYNDROME (HCC): ICD-10-CM

## 2021-07-02 ENCOUNTER — TELEPHONE (OUTPATIENT)
Dept: HEMATOLOGY/ONCOLOGY | Facility: HOSPITAL | Age: 51
End: 2021-07-02

## 2021-07-02 ENCOUNTER — HOSPITAL ENCOUNTER (OUTPATIENT)
Dept: MAMMOGRAPHY | Facility: HOSPITAL | Age: 51
Discharge: HOME | End: 2021-07-02
Attending: INTERNAL MEDICINE
Payer: COMMERCIAL

## 2021-07-02 DIAGNOSIS — Z12.31 SCREENING MAMMOGRAM, ENCOUNTER FOR: ICD-10-CM

## 2021-07-02 LAB — INR: 3.6 (ref 0.8–1.2)

## 2021-07-02 PROCEDURE — 77063 BREAST TOMOSYNTHESIS BI: CPT | Performed by: INTERNAL MEDICINE

## 2021-07-02 PROCEDURE — 77067 SCR MAMMO BI INCL CAD: CPT | Performed by: INTERNAL MEDICINE

## 2021-07-02 NOTE — TELEPHONE ENCOUNTER
Denita says she had a mammo done this morning, and she got a message that she  needed to come back for another scan on her left breast. She is asking to speak with a nurse about her results and why she needs another scan.

## 2021-07-07 ENCOUNTER — ANTI-COAG VISIT (OUTPATIENT)
Dept: HEMATOLOGY/ONCOLOGY | Facility: HOSPITAL | Age: 51
End: 2021-07-07

## 2021-07-07 DIAGNOSIS — D68.61 ANTIPHOSPHOLIPID ANTIBODY SYNDROME (HCC): ICD-10-CM

## 2021-07-07 LAB — INR: 2.2 (ref 0.8–1.2)

## 2021-07-09 ENCOUNTER — HOSPITAL ENCOUNTER (OUTPATIENT)
Dept: MAMMOGRAPHY | Facility: HOSPITAL | Age: 51
Discharge: HOME | End: 2021-07-09
Attending: INTERNAL MEDICINE
Payer: COMMERCIAL

## 2021-07-09 DIAGNOSIS — Z12.31 SCREENING MAMMOGRAM, ENCOUNTER FOR: Primary | ICD-10-CM

## 2021-07-09 DIAGNOSIS — R92.2 INCONCLUSIVE MAMMOGRAM: ICD-10-CM

## 2021-07-09 DIAGNOSIS — M32.19 OTHER SYSTEMIC LUPUS ERYTHEMATOSUS WITH OTHER ORGAN INVOLVEMENT (HCC): ICD-10-CM

## 2021-07-09 PROCEDURE — 76642 ULTRASOUND BREAST LIMITED: CPT | Performed by: INTERNAL MEDICINE

## 2021-07-09 PROCEDURE — 77065 DX MAMMO INCL CAD UNI: CPT | Performed by: INTERNAL MEDICINE

## 2021-07-09 PROCEDURE — 77061 BREAST TOMOSYNTHESIS UNI: CPT | Performed by: INTERNAL MEDICINE

## 2021-07-20 ENCOUNTER — ANTI-COAG VISIT (OUTPATIENT)
Dept: HEMATOLOGY/ONCOLOGY | Facility: HOSPITAL | Age: 51
End: 2021-07-20

## 2021-07-20 DIAGNOSIS — D68.61 ANTIPHOSPHOLIPID ANTIBODY SYNDROME (HCC): ICD-10-CM

## 2021-07-20 LAB — INR: 2.5 (ref 0.8–1.2)

## 2021-07-20 RX ORDER — WARFARIN SODIUM 5 MG/1
5 TABLET ORAL NIGHTLY
Qty: 30 TABLET | Refills: 2 | Status: SHIPPED | OUTPATIENT
Start: 2021-07-20

## 2021-07-20 RX ORDER — WARFARIN SODIUM 4 MG/1
4 TABLET ORAL DAILY
Qty: 30 TABLET | Refills: 2 | Status: SHIPPED | OUTPATIENT
Start: 2021-07-20

## 2021-07-20 RX ORDER — WARFARIN SODIUM 1 MG/1
1 TABLET ORAL NIGHTLY
Qty: 30 TABLET | Refills: 2 | Status: SHIPPED | OUTPATIENT
Start: 2021-07-20

## 2021-07-20 RX ORDER — WARFARIN SODIUM 2.5 MG/1
TABLET ORAL NIGHTLY
Qty: 90 TABLET | Refills: 2 | Status: SHIPPED | OUTPATIENT
Start: 2021-07-20 | End: 2021-11-11

## 2021-07-22 ENCOUNTER — TELEPHONE (OUTPATIENT)
Dept: HEMATOLOGY/ONCOLOGY | Facility: HOSPITAL | Age: 51
End: 2021-07-22

## 2021-07-22 NOTE — TELEPHONE ENCOUNTER
jaciel calling from [facility] asking about regiment on pt's anticoagulant medication.  Leyda Leal

## 2021-07-27 ENCOUNTER — ANTI-COAG VISIT (OUTPATIENT)
Dept: HEMATOLOGY/ONCOLOGY | Facility: HOSPITAL | Age: 51
End: 2021-07-27

## 2021-07-27 DIAGNOSIS — D68.61 ANTIPHOSPHOLIPID ANTIBODY SYNDROME (HCC): ICD-10-CM

## 2021-07-27 LAB — INR: 2.5 (ref 0.8–1.2)

## 2021-08-02 ENCOUNTER — APPOINTMENT (OUTPATIENT)
Dept: GENERAL RADIOLOGY | Age: 51
End: 2021-08-02
Attending: EMERGENCY MEDICINE
Payer: COMMERCIAL

## 2021-08-02 ENCOUNTER — HOSPITAL ENCOUNTER (EMERGENCY)
Age: 51
Discharge: HOME | End: 2021-08-02
Attending: EMERGENCY MEDICINE
Payer: COMMERCIAL

## 2021-08-02 VITALS
HEIGHT: 62 IN | HEART RATE: 97 BPM | SYSTOLIC BLOOD PRESSURE: 133 MMHG | TEMPERATURE: 98 F | DIASTOLIC BLOOD PRESSURE: 86 MMHG | OXYGEN SATURATION: 97 % | RESPIRATION RATE: 16 BRPM | WEIGHT: 220 LBS | BODY MASS INDEX: 40.48 KG/M2

## 2021-08-02 DIAGNOSIS — B34.9 VIRAL ILLNESS: Primary | ICD-10-CM

## 2021-08-02 DIAGNOSIS — Z11.52 ENCOUNTER FOR SCREENING FOR COVID-19: ICD-10-CM

## 2021-08-02 LAB — SARS-COV-2 RNA RESP QL NAA+PROBE: NOT DETECTED

## 2021-08-02 PROCEDURE — 71045 X-RAY EXAM CHEST 1 VIEW: CPT | Performed by: EMERGENCY MEDICINE

## 2021-08-02 PROCEDURE — 99283 EMERGENCY DEPT VISIT LOW MDM: CPT

## 2021-08-03 NOTE — ED PROVIDER NOTES
[facility]: MED RECONCILIATION    Comments/Recommendations:  Patient manages her own medications with a pill box and knows each medication, doses, and timing of last administrations for each medication.  The patient reported no longer using Benzonatate, Tussionex, or Temazepam.    Medications added:     · None    Medications removed:    · Benzonatate  · Tussionex  · Temazepam    Medications adjusted:    · None    Information obtained from:   Patient    Significant PMH/Disease States:   Patient Active Problem List   Diagnosis Code    CAD (coronary Artery Disease) Native Artery I25.10    JASEN on CPAP G47.33    Interstitial lung disease ([facility] J84.9    Pulmonary HTN ([facility] I27.2    HTN (hypertension) I10    Morbid obesity E66.01    Esophageal reflux K21.9    Gastroparesis K31.84    Chronic diastolic heart failure (HCC) I50.32    DJD (degenerative joint disease) of knee M17.9    Normocytic anemia D64.9    DM (diabetes mellitus), type 2 with renal complications ([facility]) N61.73    Vitamin D deficiency E55.9    Angina, class III ([facility]) I20.9    DVT (deep venous thrombosis) ([facility]) I82.409    CKD (chronic kidney disease) stage 4, GFR 15-29 ml/min ([facility]) N18.4    Acute and chronic respiratory failure with hypoxia ([facility]) J96.21    CHF exacerbation ([facility]) I50.9     Past Medical History   Diagnosis Date     Sleep Apnea 2/16/2011    Angina, class III ([facility] 8/9/2013    Aortic aneurysm (HCC)     CAD (coronary Artery Disease) Native Artery 2/16/2011    CKD (chronic kidney disease) stage 4, GFR 15-29 ml/min ([facility]) 2/10/2017    Diabetic gastroparesis ([facility])     Diastolic heart failure ([facility] 10/5/2012    Esophageal stricture 2012     dialted Dr. Karis Borden G6PD deficiency ([facility]      trait    GERD (gastroesophageal reflux disease)     Hypertensive Cardiovasc Dis Benign, No CHF 2/16/2011    ILD (interstitial lung disease) ([facility]      open lung bx CJW 2010    OA (osteoarthritis)     Obesity 2/16/2011    Ovarian cancer ([facility] Patient Seen in: THE [facility] Department In [place]      History   Patient presents with:  Cough/URI  Body ache and/or chills    Stated Complaint: chills, diarrhea, cough    HPI/Subjective:   HPI    43-year-old female unimmunized presents to the e cervical and uterine    Rheumatoid arteritis ([facility]     T2DM (type 2 diabetes mellitus) ([facility] 2013    Tobacco use disorder 3/21/2012    Uterine cervix cancer ([facility]     Vitamin D deficiency 2013     Chief Complaint for this Admission:   Chief Complaint   Patient presents with    Weight Gain     Allergies: Contrast dye [iodine]; Levaquin [levofloxacin]; and Morphine    Prior to Admission Medications:   Prior to Admission Medications   Prescriptions Last Dose Informant Patient Reported? Taking? INSULIN LISPRO (HUMALOG SC) 2017 at AM  Yes Yes   Sig: by SubCUTAneous route Before breakfast, lunch, and dinner. Sliding scale, stated usually using around 60units three times daily   albuterol (PROVENTIL HFA, VENTOLIN HFA, PROAIR HFA) 90 mcg/actuation inhaler Unknown at Unknown time  Yes No   Sig: Take 2 Puffs by inhalation every four (4) hours as needed for Wheezing. albuterol-ipratropium (DUONEB) 2.5 mg-0.5 mg/3 ml nebu 2017 at AM  Yes Yes   Sig: 3 mL by Nebulization route three (3) times daily. amLODIPine (NORVASC) 10 mg tablet 2017 at AM  Yes Yes   Sig: Take 10 mg by mouth daily. aspirin 81 mg tablet 2017 at AM  Yes Yes   Sig: Take 81 mg by mouth daily. atorvastatin (LIPITOR) 80 mg tablet 2/15/2017 at PM Self Yes Yes   Sig: Take 80 mg by mouth every evening. bumetanide (BUMEX) 1 mg tablet 2017 at AM  No Yes   Sig: Take 1 Tab by mouth two (2) times a day. calcitRIOL (ROCALTROL) 0.25 mcg capsule 2017 at AM  Yes Yes   Sig: Take 0.25 mcg by mouth two (2) days a week. Patient takes on Monday and Thursday   carvedilol (COREG) 25 mg tablet 2017 at AM  No Yes   Sig: TAKE 1 TABLET BY MOUTH TWICE A DAY   ergocalciferol (VITAMIN D2) 50,000 unit capsule 2017 at AM  Yes Yes   Sig: Take 50,000 Units by mouth every Tuesday and Thursday. fluticasone (FLONASE) 50 mcg/actuation nasal spray 2/15/2017 at PM  Yes Yes   Si Sprays by Both Nostrils route nightly. is normal.  Neck: No adenopathy or thyromegaly. No hoarseness or stridor. Lungs are clear to auscultation. Heart exam: Normal S1-S2 without extra sounds or murmurs. Regular rate and rhythm. Abdomen is nontender. Extremities are atraumatic.   Neuro exa [id number]  [phone]    Call  As needed          Medications Prescribed:  Discharge Medication List as of 8/2/2021  8:27 PM isosorbide mononitrate ER (IMDUR) 120 mg CR tablet 2017 at AM  No Yes   Sig: TAKE 1 TABLET BY MOUTH EVERY DAY   linaclotide (LINZESS) 290 mcg cap capsule 2017 at AM  No Yes   Sig: Take 1 Cap by mouth Daily (before breakfast) for 10 days. mupirocin (BACTROBAN) 2 % ointment Unknown at Unknown time  Yes No   Sig: Apply  to affected area two (2) times daily as needed (lesions on feet when needed). Ointment external two times daily to lesions on feet until healed - now using as needed   nitroglycerin (NITROSTAT) 0.3 mg SL tablet 2017 at Unknown time  No Yes   Si Tab by SubLINGual route every five (5) minutes as needed for Chest Pain. ondansetron (ZOFRAN ODT) 4 mg disintegrating tablet Unknown at Unknown time  No No   Sig: Take 1 Tab by mouth every six (6) hours as needed for Nausea. oxyCODONE-acetaminophen (PERCOCET 7.5) 7.5-325 mg per tablet Unknown at Unknown time  Yes No   Sig: Take 1 Tab by mouth every four (4) hours as needed. pantoprazole (PROTONIX) 40 mg tablet 2017 at AM  No Yes   Sig: Take 1 Tab by mouth daily. Indications: GASTROESOPHAGEAL REFLUX   polyethylene glycol (MIRALAX) 17 gram packet Unknown at Unknown time  No No   Sig: Take 1 Packet by mouth daily. Patient taking differently: Take 17 g by mouth daily as needed. predniSONE (DELTASONE) 10 mg tablet 2017 at AM  No Yes   Sig: Take 30mg for three days then 20mg for three days then 10mg for three days   senna-docusate (PERICOLACE) 8.6-50 mg per tablet 2/15/2017 at PM  No Yes   Sig: Take 2 Tabs by mouth daily. sucralfate (CARAFATE) 1 gram tablet 2017 at 1200  Yes Yes   Sig: Take 1 g by mouth Before breakfast, lunch, dinner and at bedtime. warfarin (COUMADIN) 4 mg tablet 2017 at AM  No Yes   Sig: Take 1 Tab by mouth daily.       Facility-Administered Medications: None     Zoey Lo, PharmD, BCPS  Contact:

## 2021-08-04 LAB — SARS-COV-2 RNA RESP QL NAA+PROBE: NOT DETECTED

## 2021-08-12 ENCOUNTER — OFFICE VISIT (OUTPATIENT)
Dept: RHEUMATOLOGY | Facility: CLINIC | Age: 51
End: 2021-08-12
Payer: COMMERCIAL

## 2021-08-12 ENCOUNTER — TELEPHONE (OUTPATIENT)
Dept: RHEUMATOLOGY | Facility: CLINIC | Age: 51
End: 2021-08-12

## 2021-08-12 VITALS
DIASTOLIC BLOOD PRESSURE: 85 MMHG | HEART RATE: 96 BPM | WEIGHT: 226 LBS | OXYGEN SATURATION: 97 % | BODY MASS INDEX: 41.59 KG/M2 | HEIGHT: 62 IN | SYSTOLIC BLOOD PRESSURE: 130 MMHG

## 2021-08-12 DIAGNOSIS — G47.8 UNREFRESHED BY SLEEP: ICD-10-CM

## 2021-08-12 DIAGNOSIS — Z11.1 SCREENING FOR TUBERCULOSIS: ICD-10-CM

## 2021-08-12 DIAGNOSIS — D68.61 ANTIPHOSPHOLIPID ANTIBODY SYNDROME (HCC): ICD-10-CM

## 2021-08-12 DIAGNOSIS — Z11.59 NEED FOR HEPATITIS B SCREENING TEST: ICD-10-CM

## 2021-08-12 DIAGNOSIS — R06.00 DYSPNEA ON EXERTION: Primary | ICD-10-CM

## 2021-08-12 DIAGNOSIS — M32.19 OTHER SYSTEMIC LUPUS ERYTHEMATOSUS WITH OTHER ORGAN INVOLVEMENT (HCC): ICD-10-CM

## 2021-08-12 DIAGNOSIS — R06.83 SNORING: ICD-10-CM

## 2021-08-12 DIAGNOSIS — M79.7 FIBROMYALGIA: ICD-10-CM

## 2021-08-12 DIAGNOSIS — M06.9 RHEUMATOID ARTHRITIS INVOLVING MULTIPLE SITES, UNSPECIFIED WHETHER RHEUMATOID FACTOR PRESENT (HCC): ICD-10-CM

## 2021-08-12 PROCEDURE — 3075F SYST BP GE 130 - 139MM HG: CPT | Performed by: INTERNAL MEDICINE

## 2021-08-12 PROCEDURE — 99215 OFFICE O/P EST HI 40 MIN: CPT | Performed by: INTERNAL MEDICINE

## 2021-08-12 PROCEDURE — 3008F BODY MASS INDEX DOCD: CPT | Performed by: INTERNAL MEDICINE

## 2021-08-12 PROCEDURE — 3079F DIAST BP 80-89 MM HG: CPT | Performed by: INTERNAL MEDICINE

## 2021-08-12 RX ORDER — PREDNISONE 10 MG/1
TABLET ORAL
Qty: 136 TABLET | Refills: 0 | Status: SHIPPED | OUTPATIENT
Start: 2021-08-12 | End: 2021-11-10

## 2021-08-12 RX ORDER — ALBUTEROL SULFATE 90 UG/1
AEROSOL, METERED RESPIRATORY (INHALATION)
COMMUNITY

## 2021-08-12 RX ORDER — HYDROXYCHLOROQUINE SULFATE 200 MG/1
200 TABLET, FILM COATED ORAL 2 TIMES DAILY
Qty: 180 TABLET | Refills: 1 | Status: SHIPPED | OUTPATIENT
Start: 2021-08-12 | End: 2021-11-15

## 2021-08-12 RX ORDER — LEVOFLOXACIN 500 MG/1
TABLET, FILM COATED ORAL
COMMUNITY

## 2021-08-12 NOTE — PATIENT INSTRUCTIONS
Talk to your primary care doctor about potentially changing the Levaquin to something you don't have a reaction to. Get bloodwork    Will let you know about Rasuvo.      Hydroxychloroquine (plaquenil) start: Take 1 tab daily for 2 weeks, and if tolerating trimmed. · The healthcare provider puts electrode pads on or near the area of pain, or on other places on your body. He or she may put a gel on the pads first.  · The provider attaches wires to the electrodes and to the TENS machine.   · The machine sends

## 2021-08-12 NOTE — PROGRESS NOTES
Rheumatology Follow-up Note  =====================================================================================================      Date of visit: 8/12/2021  ? Patient presents with:   Follow - Up: Dr. Glenis Jones pt f/u, went to ER on 08/02 at [address] 2 tablets (20 mg total) daily for 7 days, THEN 1.5 tablets (15 mg total) daily for 7 days, THEN 1 tablet (10 mg total) daily. , Disp: 136 tablet, Rfl: 0  warfarin 2.5 MG Oral Tab, Take 1-3 tablets (2.5-7.5 mg total) by mouth nightly., Disp: 90 tablet, Rfl: medications on file     There are no discontinued medications.   Past Medical History:  Past Medical History:   Diagnosis Date   • DVT, recurrent, lower extremity, acute, right ([facility]    • Fatty liver    • Fibromyalgia    • Hyperlipidemia    • Hypertension the left   +wide spread alodynia.        ?  Labs:  Lab Results   Component Value Date    WBC 7.8 03/30/2021    RBC 4.58 03/30/2021    HGB 13.6 03/30/2021    HCT 43.0 03/30/2021    .0 03/30/2021    MCV 93.9 03/30/2021    MCH 29.7 03/30/2021    MCHC 3 7:58 PM     Finalized by (CST): Herlinda Renee MD on 8/02/2021 at 7:58 PM         =====================================================================================================  Assessment and Plan    Assessment:  Dyspnea on exertion  (primary e 4 tablets (40 mg total) by mouth daily for 7 days, THEN 3 tablets (30 mg total) daily for 7 days, THEN 2 tablets (20 mg total) daily for 7 days, THEN 1.5 tablets (15 mg total) daily for 7 days, THEN 1 tablet (10 mg total) daily.  -Hydroxychloroquine (plaqu involvement ([email or web address]  -     COMP METABOLIC PANEL (14); Future  -     COMPLEMENT C3, SERUM; Future  -     COMPLEMENT C4, SERUM; Future  -     CBC WITH DIFFERENTIAL WITH PLATELET; Future  -     SED RATE, WESTERGREN (AUTOMATED);  Future  -     C-REACTIVE PROTEIN; evaluation  •Clinical documentation in the EHR or other health record  [address] and educating the patient or caregiver  •Interpreting results and/or communicating results to the patient/family/caregiver  •Care coordination  •Ordering medications, tests,

## 2021-08-13 NOTE — PROGRESS NOTES
Pa for Rasuvo approved 3/11/21-3/11/22. Prescription transferred to [employer] [phone]. Copay was quite high.  Our office has submitted provider enrollment form for patient assistance, but not sure if pt submitted and qualified for foundation assist

## 2021-08-13 NOTE — PROGRESS NOTES
Pt returned my call, pt states she has completed the [employer] connection for patient assistance, and never heard anything. Explained I left a voice message with them this am for update, and advised pt to call them as well. Number give.  Explained to pt sample o

## 2021-08-16 ENCOUNTER — TELEPHONE (OUTPATIENT)
Dept: RHEUMATOLOGY | Facility: CLINIC | Age: 51
End: 2021-08-16

## 2021-08-16 NOTE — TELEPHONE ENCOUNTER
Phoned [employer] for PA for Cardiac Echo. No PA required. Case# Camelia Joppa 8/16/21 ar 201pm  Phoned pt<LVM to call central scheduling to schedule.

## 2021-08-16 NOTE — PROGRESS NOTES
Education Record    Learner:  Patient    Disease / Henrine Aase labs     Barriers / Limitations:  None   Comments:    Method:  Discussion   Comments:    General Topics:  Plan of care reviewed   Comments:    Outcome:  Shows understanding   Comments: Elizabeth Nick and I were pleased to meet with you today. Today we reviewed your imaging and symptoms. We discussed:    - bronchoscopic biopsy vs right video assisted thoracoscopic surgery lung biopsy  - the benefit of more tissue with surgical biopsy  - we can touch base with you later this week after you have had some time to think about your options      If you have a question or concerns at any time please call [phone] to speak to our staff.     XANDER Sher  _____________________________________________________    SURGERY INSTRUCTIONS (IF YOU DECIDE TO PROCEED)  You will be scheduled for right VATS lung biopsy.  Your surgery will be scheduled at [facility].   You will receive a call from our office 2-3 days prior to the date of your surgery with an arrival time.    THINGS TO ACCOMPLISH BEFORE SURGERY:   1. Pre op labs, EKG and MRSA nasal swab.  2. Pre-surgical clearance by PCP   COVID-19 nasal swab 2 days prior to surgery then self isolate at home. Check temperature twice daily for 1 week prior to surgery.  Call our office if temperature >100F or with any symptoms* or exposure to someone with COVID-19. (*Symptoms include new or worsening cough, shortness of breath, sore throat, new onset of nausea, diarrhea or vomiting, loss of taste or smell, chills, repeated shaking with chills, muscle pain or headache).  Nutritional supplement with Ensure Enlive/Max twice daily for 5 days prior to surgery. See instructions below.   ?   PRE-OPERATIVE INSTRUCTIONS   • No eating or drinking after midnight day before procedure   • One week prior to surgery, DO NOT TAKE medications which may cause bleeding. These include: Aspirin, Ibuprofen, Plavix, Warfarin (Coumadin) or herbal medicines. Please stop these medications one week prior to surgery as directed   • Stop Aspirin, Aleve, and Supplements (garlic) 7 days before surgery   • Take a shower with Hibiclens soap the evening before and  morning of surgery. See instructions below.  • Diabetic patients should call their primary care physician or endocrinologist for diabetic specific medication instruction prior to surgery.  • A nurse from preadmission will call a few days before your surgery. They will instruct you on the medication to take the morning of surgery.   • Your anesthesiologist will meet with you the morning of your surgery and answer any questions you may have.  • Please leave valuables at home: money, credit cards, jewelry, etc.   ?   Please call us with any questions or concerns.   Monica Nick MD/Ne TERRELL  Thoracic coordinator: Юлия James RN  [phone]     Four rules for discharge from the hospital:   1. Eating    2. Walking   3. Urinating   4. Taking pain pills    When you have accomplished these 4 things, it is safe for you to go home.    ** You will also be walking every 4 hours around the clock   **Your drainage tube may or may not be removed prior to discharge.   If you go home with your tube, we will teach you how to take care of it and send a nurse to your house to help you change the dressing.    HIBICLENS SHOWERING INSTRUCTIONS   1. Wash and rinse your hair, face, and body using your normal shampoo and soap.   2. Make sure you completely rinse off in a very thorough manner.   3. Turn off the shower.   4. Use 4 oz of for each application (half of the 8 oz bottle the night before surgery and the remaining half the morning of surgery). Apply the CHG/Hibiclens soap onto a wet, clean washcloth, and apply to your entire body FROM THE NECK DOWN TO TOES. Do NOT use on your face, hair, or genital areas.   5. Rub the soap filled washcloth over body from your neck down to your toes for 2- 3 minutes; apply more soap as needed (1/2 of bottle should be used with each of the 2 showers/cleansing). Avoid scrubbing your skin too hard.   6. After 2-3 minutes turn on the shower & rinse the soap off your body  completely with warm water.   7. Do NOT use regular soap after washing with the Hibiclens.   8. Pat your skin dry with a freshly laundered, clean towel after each shower.   9. Dress with freshly laundered clothes after each shower.   10. It is important before the first use of Hibiclens to apply freshly laundered bed linens.   11. Do NOT apply any lotions, deodorants, powders, or perfumes to your body.   12. Do NOT shave your legs the night before or the day of surgery. Nor, remove any body hair below the neck.    Nutrition Before Surgery   When having surgery, it is important to pay extra attention to nutrition. Drinking a nutrition beverage can help you meet the calorie and nutrient needs for surgery and better recovery.    Nutrition Prescription   • Drink 2 Ensure Enlive/Max per day for 5 days prior to surgery   • This product is available at the hospital pharmacy in addition to grocery stores, discount stores and pharmacies.   • This is in addition to your regular diet intake   Tips for adding Oral Nutrition Supplements to your daily routine:   • Drink when taking medications if they can be taken with food   • Serve chilled, freeze into a popsicle or blend with ice cream and fruit into a delicious nutritious shake   • Enjoy as a snack   • Pour over cereal instead of milk   · If you have diabetes, you may consume in 4 oz portions throughout the day to reduce effects on blood sugars   Ensure Max:  Ensure Enlive:   30 grams of protein  20 grams of protein   1 gram of sugar  350 calories   150 calories

## 2021-09-01 ENCOUNTER — ANTI-COAG VISIT (OUTPATIENT)
Dept: HEMATOLOGY/ONCOLOGY | Facility: HOSPITAL | Age: 51
End: 2021-09-01

## 2021-09-01 DIAGNOSIS — D68.61 ANTIPHOSPHOLIPID ANTIBODY SYNDROME (HCC): ICD-10-CM

## 2021-09-01 LAB — INR: 3.2 (ref 0.8–1.2)

## 2021-09-27 ENCOUNTER — TELEPHONE (OUTPATIENT)
Dept: RHEUMATOLOGY | Facility: CLINIC | Age: 51
End: 2021-09-27

## 2021-09-27 NOTE — TELEPHONE ENCOUNTER
Called pt and left a message informing her that she missed her 11 am appointment on 9/27/2021 and to call the office to reschedule

## 2021-11-10 ENCOUNTER — HOSPITAL ENCOUNTER (EMERGENCY)
Age: 51
Discharge: HOME | End: 2021-11-11
Attending: EMERGENCY MEDICINE
Payer: COMMERCIAL
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
## 2021-11-10 DIAGNOSIS — S20.212A CONTUSION OF LEFT CHEST WALL, INITIAL ENCOUNTER: ICD-10-CM

## 2021-11-10 DIAGNOSIS — S90.02XA CONTUSION OF LEFT ANKLE, INITIAL ENCOUNTER: ICD-10-CM

## 2021-11-10 DIAGNOSIS — R11.2 NAUSEA AND VOMITING, INTRACTABILITY OF VOMITING NOT SPECIFIED, UNSPECIFIED VOMITING TYPE: ICD-10-CM

## 2021-11-10 PROCEDURE — 96361 HYDRATE IV INFUSION ADD-ON: CPT

## 2021-11-10 PROCEDURE — 99284 EMERGENCY DEPT VISIT MOD MDM: CPT

## 2021-11-10 PROCEDURE — 96360 HYDRATION IV INFUSION INIT: CPT

## 2021-11-11 ENCOUNTER — APPOINTMENT (OUTPATIENT)
Dept: GENERAL RADIOLOGY | Age: 51
End: 2021-11-11
Attending: EMERGENCY MEDICINE
Payer: COMMERCIAL

## 2021-11-11 ENCOUNTER — ANTI-COAG VISIT (OUTPATIENT)
Dept: HEMATOLOGY/ONCOLOGY | Facility: HOSPITAL | Age: 51
End: 2021-11-11

## 2021-11-11 VITALS
RESPIRATION RATE: 18 BRPM | TEMPERATURE: 98 F | SYSTOLIC BLOOD PRESSURE: 137 MMHG | BODY MASS INDEX: 39 KG/M2 | WEIGHT: 212 LBS | DIASTOLIC BLOOD PRESSURE: 71 MMHG | OXYGEN SATURATION: 100 % | HEART RATE: 64 BPM

## 2021-11-11 DIAGNOSIS — D68.61 ANTIPHOSPHOLIPID ANTIBODY SYNDROME (HCC): ICD-10-CM

## 2021-11-11 PROCEDURE — 71101 X-RAY EXAM UNILAT RIBS/CHEST: CPT | Performed by: EMERGENCY MEDICINE

## 2021-11-11 PROCEDURE — 80053 COMPREHEN METABOLIC PANEL: CPT | Performed by: EMERGENCY MEDICINE

## 2021-11-11 PROCEDURE — 85025 COMPLETE CBC W/AUTO DIFF WBC: CPT | Performed by: EMERGENCY MEDICINE

## 2021-11-11 PROCEDURE — 73610 X-RAY EXAM OF ANKLE: CPT | Performed by: EMERGENCY MEDICINE

## 2021-11-11 RX ORDER — ONDANSETRON 4 MG/1
4 TABLET, ORALLY DISINTEGRATING ORAL EVERY 4 HOURS PRN
Qty: 10 TABLET | Refills: 0 | Status: SHIPPED | OUTPATIENT
Start: 2021-11-11 | End: 2021-11-18

## 2021-11-11 RX ORDER — WARFARIN SODIUM 2.5 MG/1
TABLET ORAL NIGHTLY
Qty: 90 TABLET | Refills: 2 | Status: SHIPPED | OUTPATIENT
Start: 2021-11-11 | End: 2022-02-02

## 2021-11-11 NOTE — ED PROVIDER NOTES
Patient Seen in: THE [facility] Emergency Department In [place]      History   Patient presents with:  Fall    Stated Complaint: left chest and ankle pain states fell and landed on bar stool    Subjective:   HORTENSIA    Stephie Sykes is a 43-year-old female presenting wit distress  HEENT exam: Normal  Lungs are clear to auscultation bilaterally with no wheezes retractions or rhonchi noted  Cardiovascular exam shows a regular rate and rhythm  Abdomen soft nontender with no rebound tenderness noted  Extremity exam shows no cl medications    ondansetron 4 MG Oral Tablet Dispersible  Take 1 tablet (4 mg total) by mouth every 4 (four) hours as needed for Nausea.   Qty: 10 tablet Refills: 0

## 2021-11-11 NOTE — ED INITIAL ASSESSMENT (HPI)
Patient arrives from home with c/o lost balance and fell hitting chest on table.  C/O chest tenderness and left wrist pain

## 2021-11-14 DIAGNOSIS — M32.19 OTHER SYSTEMIC LUPUS ERYTHEMATOSUS WITH OTHER ORGAN INVOLVEMENT (HCC): ICD-10-CM

## 2021-11-14 DIAGNOSIS — M06.9 RHEUMATOID ARTHRITIS INVOLVING MULTIPLE SITES, UNSPECIFIED WHETHER RHEUMATOID FACTOR PRESENT (HCC): ICD-10-CM

## 2021-11-15 RX ORDER — HYDROXYCHLOROQUINE SULFATE 200 MG/1
TABLET, FILM COATED ORAL
Qty: 180 TABLET | Refills: 1 | Status: SHIPPED | OUTPATIENT
Start: 2021-11-15 | End: 2022-01-24

## 2021-11-15 NOTE — TELEPHONE ENCOUNTER
Future Appointments   Date Time Provider Luana Gibbs   12/9/2021 11:20 AM Darlene Hendrix PA-C [address] [facility] [address]

## 2021-11-26 ENCOUNTER — TELEPHONE (OUTPATIENT)
Dept: HEMATOLOGY/ONCOLOGY | Facility: HOSPITAL | Age: 51
End: 2021-11-26

## 2021-11-26 ENCOUNTER — ANTI-COAG VISIT (OUTPATIENT)
Dept: HEMATOLOGY/ONCOLOGY | Facility: HOSPITAL | Age: 51
End: 2021-11-26

## 2021-11-26 DIAGNOSIS — D68.61 ANTIPHOSPHOLIPID ANTIBODY SYNDROME (HCC): ICD-10-CM

## 2021-11-26 NOTE — TELEPHONE ENCOUNTER
Patient called to speak to a nurse regarding the Covid vaccine. Her and Dr Brian Renee have discussed this. She is wanting to get the vaccine. She would like to know which vaccine would be safer for her to get? Please call her to advise. Thank you.

## 2021-12-28 ENCOUNTER — ANTI-COAG VISIT (OUTPATIENT)
Dept: HEMATOLOGY/ONCOLOGY | Facility: HOSPITAL | Age: 51
End: 2021-12-28

## 2021-12-28 ENCOUNTER — PATIENT MESSAGE (OUTPATIENT)
Dept: HEMATOLOGY/ONCOLOGY | Age: 51
End: 2021-12-28

## 2021-12-28 DIAGNOSIS — D68.61 ANTIPHOSPHOLIPID ANTIBODY SYNDROME (HCC): ICD-10-CM

## 2021-12-28 NOTE — TELEPHONE ENCOUNTER
From: Daja Salazar  To: Sergey Smith MD  Sent: 12/28/2021 11:41 AM CST  Subject: INR Results    [address] everyone.  I hope you all had a wonderful [place] with your families this holiday and I pray you have a blessed N

## 2022-01-14 ENCOUNTER — ANTI-COAG VISIT (OUTPATIENT)
Dept: HEMATOLOGY/ONCOLOGY | Facility: HOSPITAL | Age: 52
End: 2022-01-14

## 2022-01-14 ENCOUNTER — HOSPITAL ENCOUNTER (OUTPATIENT)
Dept: GENERAL RADIOLOGY | Facility: HOSPITAL | Age: 52
Discharge: HOME | End: 2022-01-14
Attending: INTERNAL MEDICINE
Payer: COMMERCIAL

## 2022-01-14 DIAGNOSIS — D68.61 ANTIPHOSPHOLIPID ANTIBODY SYNDROME (HCC): ICD-10-CM

## 2022-01-14 DIAGNOSIS — R06.02 SHORTNESS OF BREATH: ICD-10-CM

## 2022-01-14 DIAGNOSIS — M32.19 OTHER SYSTEMIC LUPUS ERYTHEMATOSUS WITH OTHER ORGAN INVOLVEMENT (HCC): ICD-10-CM

## 2022-01-14 LAB — INR: 2.4 (ref 0.8–1.2)

## 2022-01-14 PROCEDURE — 71046 X-RAY EXAM CHEST 2 VIEWS: CPT | Performed by: INTERNAL MEDICINE

## 2022-01-24 ENCOUNTER — OFFICE VISIT (OUTPATIENT)
Dept: RHEUMATOLOGY | Facility: CLINIC | Age: 52
End: 2022-01-24
Payer: COMMERCIAL

## 2022-01-24 VITALS
SYSTOLIC BLOOD PRESSURE: 118 MMHG | TEMPERATURE: 97 F | RESPIRATION RATE: 16 BRPM | HEIGHT: 62 IN | HEART RATE: 72 BPM | DIASTOLIC BLOOD PRESSURE: 70 MMHG | WEIGHT: 213 LBS | BODY MASS INDEX: 39.2 KG/M2

## 2022-01-24 DIAGNOSIS — M32.19 OTHER SYSTEMIC LUPUS ERYTHEMATOSUS WITH OTHER ORGAN INVOLVEMENT (HCC): Primary | ICD-10-CM

## 2022-01-24 DIAGNOSIS — D68.61 ANTIPHOSPHOLIPID ANTIBODY SYNDROME (HCC): ICD-10-CM

## 2022-01-24 DIAGNOSIS — Z79.899 HIGH RISK MEDICATION USE: ICD-10-CM

## 2022-01-24 DIAGNOSIS — R79.82 ELEVATED C-REACTIVE PROTEIN (CRP): ICD-10-CM

## 2022-01-24 DIAGNOSIS — M25.50 POLYARTHRALGIA: ICD-10-CM

## 2022-01-24 DIAGNOSIS — Z79.52 LONG TERM (CURRENT) USE OF SYSTEMIC STEROIDS: ICD-10-CM

## 2022-01-24 DIAGNOSIS — M79.7 FIBROMYALGIA: ICD-10-CM

## 2022-01-24 DIAGNOSIS — R76.8 SS-A ANTIBODY POSITIVE: ICD-10-CM

## 2022-01-24 DIAGNOSIS — R93.89 ABNORMAL CHEST X-RAY: ICD-10-CM

## 2022-01-24 DIAGNOSIS — R76.8 DS DNA ANTIBODY POSITIVE: ICD-10-CM

## 2022-01-24 DIAGNOSIS — M06.9 RHEUMATOID ARTHRITIS INVOLVING MULTIPLE SITES, UNSPECIFIED WHETHER RHEUMATOID FACTOR PRESENT (HCC): ICD-10-CM

## 2022-01-24 DIAGNOSIS — Z13.820 SCREENING FOR OSTEOPOROSIS: ICD-10-CM

## 2022-01-24 PROCEDURE — 3078F DIAST BP <80 MM HG: CPT | Performed by: INTERNAL MEDICINE

## 2022-01-24 PROCEDURE — 99215 OFFICE O/P EST HI 40 MIN: CPT | Performed by: INTERNAL MEDICINE

## 2022-01-24 PROCEDURE — 3074F SYST BP LT 130 MM HG: CPT | Performed by: INTERNAL MEDICINE

## 2022-01-24 PROCEDURE — 3008F BODY MASS INDEX DOCD: CPT | Performed by: INTERNAL MEDICINE

## 2022-01-24 RX ORDER — PREDNISONE 10 MG/1
10 TABLET ORAL DAILY
Qty: 90 TABLET | Refills: 0 | Status: SHIPPED | OUTPATIENT
Start: 2022-01-24 | End: 2022-01-28

## 2022-01-24 RX ORDER — HYDROXYCHLOROQUINE SULFATE 200 MG/1
200 TABLET, FILM COATED ORAL 2 TIMES DAILY
Qty: 180 TABLET | Refills: 1 | Status: SHIPPED | OUTPATIENT
Start: 2022-01-24

## 2022-01-24 NOTE — PROGRESS NOTES
RHEUMATOLOGY Follow up   Date of visit: 01/24/2022  ? Patient presents with:  SLE: 5 month f/u with Dr. La Nena Caban. Feeling ok. Some good days and some bad days. Joint pain and swelling is stable. Fatigue. No shortness of breath. No new symptoms.      ?  ASSE medication. She has been only taking hydroxychloroquine and moderate prednisone dosing. She has not gotten the covid vaccine yet and I strongly recommended she take the opportunity of being off methotrexate to get vaccinated now.   She will get updated la service in preparing to see the patient, obtaining and/or reviewing separately obtained history, performing a medically appropriate examination, counseling and educating the patient/family/caregiver, ordering medications or testing, referring and communica (CRP)  -     COMPLEMENT C3, SERUM; Future  -     COMPLEMENT C4, SERUM; Future  -     SED RATE, WESTERGREN (AUTOMATED); Future  -     C-REACTIVE PROTEIN; Future  -     IMMUNOGLOBULIN A/G/M, QUANT;  Future    SS-A antibody positive  -     DAISY BY IFA, IGG; Fut for the left eye blurred vision and exam was grossly normal. Does have cataracts b/l. Vision is still abnormal per pt and feels there are changes. Is due for follow up. Still has low back pain.   Denies current skin rashes aside from the intermittent rash months. Also diagnosed with fibromyalgia based off \"tests\"      Currently, she feels well overall but has some bad days.   + states fatigues easily  + increased stress due to moving  + increased stress due to daughter with cerebral palsy, in her late 25s ARTHROSCOPY     • KNEE REPLACEMENT SURGERY       Family History:  Family History   Problem Relation Age of Onset   • Breast Cancer Maternal Aunt 39   • Breast Cancer Paternal Aunt 61   • Ovarian Cancer Paternal Aunt 76   • Cancer Father    • Cancer Mother Take 1 tablet by mouth every 4 (four) hours as needed for Pain., Disp: , Rfl:   ALPRAZolam 0.25 MG Oral Tab, Take 0.25 mg by mouth nightly as needed. , Disp: , Rfl:   Levothyroxine Sodium 75 MCG Oral Tab, Take 75 mcg by mouth before breakfast., Disp: , Rfl: Endo/Heme/Allergies: Positive for polydipsia. Bruises/bleeds easily. Psychiatric/Behavioral: Positive for depression. The patient is nervous/anxious. The patient does not have insomnia.       PHYSICAL EXAM   Today's Vitals:  Temperature Blood Pressure H redness or restriction of motion of the DIPs, ankles, or joints of the feet. Lymphadenopathy:      Cervical: No cervical adenopathy. Skin:     General: Skin is warm and dry. Findings: No erythema or rash.       Comments: No periungal erythema  No Negative for acute pulmonary embolism. No filling defects are seen centrally or peripherally within the pulmonary arterial system. LUNGS/PLEURA:  Development of a new area of atelectasis or scarring in the middle lobe.   Redemonstration of a curviline EOPERCENT 1.3 11/11/2021    BAPERCENT 0.3 11/11/2021    NE 4.04 11/11/2021    LYMABS 2.08 11/11/2021    MOABSO 0.72 11/11/2021    EOABSO 0.09 11/11/2021    BAABSO 0.02 11/11/2021     Lab Results   Component Value Date    GLU 97 11/11/2021    BUN 17 11/1

## 2022-01-25 NOTE — PROGRESS NOTES
PA for CT chest started with [employer]. ID# [id number].  Effective 1/25/22-3/25/22  Phoned pt and notified of approval

## 2022-01-28 RX ORDER — PREDNISONE 10 MG/1
10 TABLET ORAL DAILY
Qty: 90 TABLET | Refills: 0 | Status: SHIPPED | OUTPATIENT
Start: 2022-01-28

## 2022-01-28 NOTE — TELEPHONE ENCOUNTER
Future Appointments   Date Time Provider Luana Gibbs   2/2/2022  2:30 PM PF CT RM1 PF CT [place]   5/18/2022  5:30 PM Jennifer De Los Santos, DO [facility]     LOV 1/24/22  RTO in 3mo  [facility] phoned office, would like to clarify prednisone directions.

## 2022-02-01 DIAGNOSIS — D68.61 ANTIPHOSPHOLIPID ANTIBODY SYNDROME (HCC): ICD-10-CM

## 2022-02-02 ENCOUNTER — ANTI-COAG VISIT (OUTPATIENT)
Dept: HEMATOLOGY/ONCOLOGY | Facility: HOSPITAL | Age: 52
End: 2022-02-02

## 2022-02-02 LAB — INR: 2.5 (ref 0.8–1.2)

## 2022-02-02 RX ORDER — WARFARIN SODIUM 2.5 MG/1
TABLET ORAL
Qty: 90 TABLET | Refills: 2 | OUTPATIENT
Start: 2022-02-02

## 2022-02-02 RX ORDER — WARFARIN SODIUM 2.5 MG/1
TABLET ORAL NIGHTLY
Qty: 90 TABLET | Refills: 2 | Status: SHIPPED | OUTPATIENT
Start: 2022-02-02

## 2022-02-11 ENCOUNTER — HOSPITAL ENCOUNTER (OUTPATIENT)
Dept: CT IMAGING | Age: 52
Discharge: HOME | End: 2022-02-11
Attending: INTERNAL MEDICINE
Payer: COMMERCIAL

## 2022-02-11 ENCOUNTER — LAB ENCOUNTER (OUTPATIENT)
Dept: LAB | Age: 52
End: 2022-02-11
Attending: INTERNAL MEDICINE
Payer: COMMERCIAL

## 2022-02-11 ENCOUNTER — HOSPITAL ENCOUNTER (OUTPATIENT)
Dept: BONE DENSITY | Age: 52
Discharge: HOME | End: 2022-02-11
Attending: INTERNAL MEDICINE
Payer: COMMERCIAL

## 2022-02-11 DIAGNOSIS — R76.8 SS-A ANTIBODY POSITIVE: ICD-10-CM

## 2022-02-11 DIAGNOSIS — Z79.899 HIGH RISK MEDICATION USE: ICD-10-CM

## 2022-02-11 DIAGNOSIS — Z79.52 LONG TERM (CURRENT) USE OF SYSTEMIC STEROIDS: ICD-10-CM

## 2022-02-11 DIAGNOSIS — M06.9 RHEUMATOID ARTHRITIS INVOLVING MULTIPLE SITES, UNSPECIFIED WHETHER RHEUMATOID FACTOR PRESENT (HCC): ICD-10-CM

## 2022-02-11 DIAGNOSIS — M32.19 OTHER SYSTEMIC LUPUS ERYTHEMATOSUS WITH OTHER ORGAN INVOLVEMENT (HCC): ICD-10-CM

## 2022-02-11 DIAGNOSIS — R79.82 ELEVATED C-REACTIVE PROTEIN (CRP): ICD-10-CM

## 2022-02-11 DIAGNOSIS — R76.8 DS DNA ANTIBODY POSITIVE: ICD-10-CM

## 2022-02-11 DIAGNOSIS — R93.89 ABNORMAL CHEST X-RAY: ICD-10-CM

## 2022-02-11 DIAGNOSIS — Z13.820 SCREENING FOR OSTEOPOROSIS: ICD-10-CM

## 2022-02-11 LAB
C3 SERPL-MCNC: 127 MG/DL (ref 90–180)
C4 SERPL-MCNC: 27.4 MG/DL (ref 10–40)
CRP SERPL-MCNC: 0.45 MG/DL (ref ?–0.3)
ERYTHROCYTE [SEDIMENTATION RATE] IN BLOOD: 17 MM/HR
IGA SERPL-MCNC: 132 MG/DL (ref 70–312)
IGM SERPL-MCNC: 6.3 MG/DL (ref 43–279)
IMMUNOGLOBULIN PNL SER-MCNC: 586 MG/DL (ref 791–1643)

## 2022-02-11 PROCEDURE — 86140 C-REACTIVE PROTEIN: CPT

## 2022-02-11 PROCEDURE — 71250 CT THORAX DX C-: CPT | Performed by: INTERNAL MEDICINE

## 2022-02-11 PROCEDURE — 86225 DNA ANTIBODY NATIVE: CPT

## 2022-02-11 PROCEDURE — 86256 FLUORESCENT ANTIBODY TITER: CPT

## 2022-02-11 PROCEDURE — 77080 DXA BONE DENSITY AXIAL: CPT | Performed by: INTERNAL MEDICINE

## 2022-02-11 PROCEDURE — 82784 ASSAY IGA/IGD/IGG/IGM EACH: CPT

## 2022-02-11 PROCEDURE — 86235 NUCLEAR ANTIGEN ANTIBODY: CPT

## 2022-02-11 PROCEDURE — 82657 ENZYME CELL ACTIVITY: CPT

## 2022-02-11 PROCEDURE — 86038 ANTINUCLEAR ANTIBODIES: CPT

## 2022-02-11 PROCEDURE — 36415 COLL VENOUS BLD VENIPUNCTURE: CPT

## 2022-02-11 PROCEDURE — 85652 RBC SED RATE AUTOMATED: CPT

## 2022-02-11 PROCEDURE — 86160 COMPLEMENT ANTIGEN: CPT

## 2022-02-14 LAB
ANA SER QL: POSITIVE
CENTROMERE AB SER-ACNC: <100 AU/ML (ref ?–100)
DSDNA AB SER-ACNC: 102 IU/ML (ref ?–100)
ENA RNP AB SER-ACNC: <100 AU/ML (ref ?–100)
ENA SCL70 AB SER-ACNC: <100 AU/ML (ref ?–100)
ENA SS-A AB SER-ACNC: 448 AU/ML (ref ?–100)
ENA SS-B AB SER-ACNC: <100 AU/ML (ref ?–100)
HISTONE AB SER-ACNC: 109 AU/ML (ref ?–100)
JO-1 AUTOAB: <100 AU/ML (ref ?–100)

## 2022-02-16 LAB — ANTI-NUCLEAR ANTIBODY (ANA), HEP-2 IGG: DETECTED

## 2022-02-21 ENCOUNTER — ANTI-COAG VISIT (OUTPATIENT)
Dept: HEMATOLOGY/ONCOLOGY | Facility: HOSPITAL | Age: 52
End: 2022-02-21

## 2022-02-21 LAB — INR: 2.8 (ref 0.8–1.2)

## 2022-03-25 ENCOUNTER — ANTI-COAG VISIT (OUTPATIENT)
Dept: HEMATOLOGY/ONCOLOGY | Facility: HOSPITAL | Age: 52
End: 2022-03-25

## 2022-03-25 LAB — INR: 1.8 (ref 0.8–1.2)

## 2022-04-20 ENCOUNTER — ANTI-COAG VISIT (OUTPATIENT)
Dept: HEMATOLOGY/ONCOLOGY | Facility: HOSPITAL | Age: 52
End: 2022-04-20

## 2022-04-20 LAB — INR: 1.8 (ref 0.8–1.2)

## 2022-04-22 ENCOUNTER — HOSPITAL ENCOUNTER (OUTPATIENT)
Dept: ULTRASOUND IMAGING | Facility: HOSPITAL | Age: 52
Discharge: HOME | End: 2022-04-22
Attending: INTERNAL MEDICINE
Payer: COMMERCIAL

## 2022-04-22 DIAGNOSIS — Z86.718 HISTORY OF DVT (DEEP VEIN THROMBOSIS): ICD-10-CM

## 2022-04-22 PROCEDURE — 93971 EXTREMITY STUDY: CPT | Performed by: INTERNAL MEDICINE

## 2022-04-25 NOTE — Clinical Note
Can you help with getting roxana garcia Patient Information    When returning for blood work same day register for nurse visit for pnuemo 20 vaccine at .    Lab -- Fasting labwork has been ordered for you.    Fasting Labs Diet Restrictions  · Please drink plenty of water before your appointment.  · You can take any prescribed or routine medicine with water before your appointment.  · You can brush your teeth even if you are fasting.    Radiology -- Please call Radiology [phone] to schedule your Ultrasound    Follow Up  -- Follow up with your regular Primary Care Provider.     Additional Educational Resources:  For additional resources regarding your symptoms, diagnosis, or further health information, please visit the Health Resources section on [email or web address] or the Online Health Resources section in [employer].    Lab Hours for [facility]  You may visit any of our locations for blood work.    [place]:  [address]  [facility]  [address] Phone: [phone] [place]:  [address]  [facility] [address]  Phone: [phone]   Monday 7:00 a.m. to 7:00 p.m. Mon, Wed, Thurs, Fri 7:30 a.m. to 4:00 p.m.   Tuesday - Friday 7:00 a.m. to 5:00 p.m. Tuesday 7:30 a.m. to 4:00 p.m.            Saturday 7:00 a.m. to noon Saturday 7:30 a.m. to 11:00 a.m.                [facility]  [address] [facility] [address]  Phone: [phone] [facility]:  [address]  [address]  Phone: [phone]   Mon, Tues, Wed 7:00 a.m. to 4:00 p.m. Monday - Friday 8:00 a.m. to 5:00 p.m.   Thurs, Fri 7:00 a.m. to 4:00 p.m.     Saturday 7:00 a.m. to noon           [place]:  [address] [facility]  [address] [phone] [place]:  [address] [place]  [address] [phone]   Monday - Friday 7:00 a.m. to 5:30 p.m. Monday - Thursday 6:30 a.m. to 6:00 p.m.     Friday 6:30 a.m. to 5:00 p.m.     Saturday 6:30 - noon         [place]:  [address] [address]  Phone: [phone] [place]:  [address] [place] [address]   [facility]  [address] Phone: [phone]   Monday 6:30 a.m. to 6:00 p.m. Mon, Tues, Thur 8:00 a.m. to 7:00 p.m.   Tues and Thurs 6:30 a.m. to 5:30 p.m. Wed, Fri 8:00 a.m. to 5:00 p.m.   Wednesday 6:30 a.m. to 5:30 p.m. Saturday 8:00 a.m. to noon   Friday 6:30 a.m. to 5:30 p.m. (Mon-Fri closed from 12:00 to 12:30)   Saturday 6:30 a.m. to noon     [place]:  [address],  [place]  [address]  [phone] [place]:  [address] [place]  [address]  Phone: [phone]   Mon, Tues, Thur, Fri 8:00 a.m. to 5:00 p.m. Mon, Tues, Wed, Fri 8:00 a.m. to 5:00 p.m.   Wednesday Closed Thursday 8:00 a.m. to 7:00 p.m.     Saturday 8:00 a.m. to noon                                                          General patient information when having a lab test:    A fasting lab test requires that you have nothing to eat or drink, except for water, for a minimum of 8-10 hours before your blood draw.  Tests that commonly require fasting are the Lipid panel, with Cholesterol and Triglycerides and tests that include a glucose level, like Basic or Comprehensive Metabolic panel.  You may take prescribed medications with water and brushing your teeth is okay.      Patient Education     Eating Heart-Healthy Foods  Eating has a big impact on your heart health. In fact, eating healthier can improve several of your heart risks at once. For instance, it helps you manage weight, cholesterol, and blood pressure. Here are ideas to help you make heart-healthy changes without giving up all the foods and flavors you love.  Getting started  · Talk with your healthcare provider about eating plans, such as the DASH or Mediterranean diet. You may also be referred to a dietitian.  · Change a few things at a time. Give yourself time to get used to a few eating changes before adding more.  · Work to create a tasty, healthy eating plan that you can stick to for the rest of your life.    Goals for healthy eating  Below are some tips to improve  your eating habits:  · Limit saturated fats and trans fats. Saturated fats raise your levels of cholesterol, so keep these fats to a minimum. They are found in foods such as fatty meats, whole milk, cheese, and palm and coconut oils. Avoid trans fats because they lower good cholesterol as well as raise bad cholesterol. Trans fats are most often found in processed foods.  · Reduce sodium (salt) intake. Eating too much salt may increase your blood pressure. Limit your sodium intake to 2,300 milligrams (mg) per day (the amount in 1 teaspoon of salt), or less if your healthcare provider recommends it. Dining out less often and eating fewer processed foods are two great ways to decrease the amount of salt you consume.  · Managing calories. A calorie is a unit of energy. Your body burns calories for fuel, but if you eat more calories than your body burns, the extras are stored as fat. Your healthcare provider can help you create a diet plan to manage your calories. This will likely include eating healthier foods as well as exercising regularly. To help you track your progress, keep a diary to record what you eat and how often you exercise.  Choose the right foods  Aim to make these foods staples of your diet. If you have diabetes, you may have different recommendations than what is listed here:  · Fruits and vegetables provide plenty of nutrients without a lot of calories. At meals, fill half your plate with these foods. Split the other half of your plate between whole grains and lean protein.  · Whole grains are high in fiber and rich in vitamins and nutrients. Good choices include whole-wheat bread, pasta, and brown rice.  · Lean proteins give you nutrition with less fat. Good choices include fish, skinless chicken, and beans.  · Low-fat or nonfat dairy provides nutrients without a lot of fat. Try low-fat or nonfat milk, cheese, or yogurt.  · Healthy fats can be good for you in small amounts. These are unsaturated fats,  such as olive oil, nuts, and fish. Try to have at least 2 servings per week of fatty fish, such as salmon, sardines, mackerel, rainbow trout, and albacore tuna. These contain omega-3 fatty acids, which are good for your heart. Flaxseed is another source of a heart-healthy fat.  More on heart-healthy eating  Read food labels  Healthy eating starts at the grocery store. Be sure to pay attention to food labels on packaged foods. Look for products that are high in fiber and protein, and low in saturated fat, cholesterol, and sodium. Avoid products that contain trans fat. And pay close attention to serving size. For instance, if you plan to eat two servings, double all the numbers on the label.  Prepare food right  A key part of healthy cooking is cutting down on added fat and salt. Look on the internet for lower-fat, lower-sodium recipes. Also, try these tips:  · Remove fat from meat and skin from poultry before cooking.  · Skim fat from the surface of soups and sauces.  · Broil, boil, bake, steam, grill, and microwave food without added fats.  · Choose ingredients that spice up your food without adding calories, fat, or sodium. Try these items: horseradish, hot sauce, lemon, mustard, nonfat salad dressings, and vinegar. For salt-free herbs and spices, try basil, cilantro, cinnamon, pepper, and rosemary.  Date Last Reviewed: 10/1/2017  © 0563-8615 [employer]. [address] [place] [address], [place] [address]. All rights reserved. This information is not intended as a substitute for professional medical care. Always follow your healthcare professional's instructions.           Patient Education     Exercise for a Healthier Heart     Exercise with a friend. When activity is fun, you're more likely to stick with it.   You may wonder how you can improve the health of your heart. If you’re thinking about exercise, you’re on the right track. You don’t need to become an athlete. But you do need a certain amount of brisk  exercise to help strengthen your heart. If you have been diagnosed with a heart condition, your healthcare provider may advise exercise to help stabilize your condition. To help make exercise a habit, choose safe, fun activities.   Before you start  Check with your healthcare provider before starting an exercise program. This is especially important if you have not been active for a while. It's also important if you have a long-term (chronic) health problem such as heart disease, diabetes, or obesity. Or if you are at high risk for having these problems.   Why exercise?  Exercising regularly offers many healthy rewards. It can help you do all of the following:  · Improve your blood cholesterol level to help prevent further heart trouble  · Lower your blood pressure to help prevent a stroke or heart attack  · Control diabetes, or reduce your risk of getting this disease  · Improve your heart and lung function  · Reach and stay at a healthy weight  · Make your muscles stronger so you can stay active  · Prevent falls and fractures by slowing the loss of bone mass (osteoporosis)  · Manage stress better  · Reduce your blood pressure  · Improve your sense of self and your body image  Exercise tips    · Ease into your routine. Set small goals. Then build on them. If you are not sure what your activity level should be, talk with your healthcare provider first before starting an exercise routine.  · Exercise on most days. Aim for a total of 150 minutes (2 hours and 30 minutes) or more of moderate-intensity aerobic activity each week. Or 75 minutes (1 hour and 15 minutes) or more of vigorous-intensity aerobic activity each week. Or try for a combination of both. Moderate activity means that you breathe heavier and your heart rate increases but you can still talk. Think about doing 40 minutes of moderate exercise, 3 to 4 times a week. For best results, activity should last for about 40 minutes to lower blood pressure and  cholesterol. It's OK to work up to the 40-minute period over time. Examples of moderate-intensity activity are walking 1 mile in 15 minutes. Or doing 30 to 45 minutes of yard work.  · Step up your daily activity level.  Along with your exercise program, try being more active the whole day. Walk instead of drive. Or park further away so that you take more steps each day. Do more household tasks or yard work. You may not be able to meet the advised mount of physical activity. But doing some moderate- or vigorous-intensity aerobic activity can help reduce your risk for heart disease. Your healthcare provider can help you figure out what is best for you.  · Choose 1 or more activities you enjoy.  Walking is one of the easiest things you can do. You can also try swimming, riding a bike, dancing, or taking an exercise class.    When to call your healthcare provider  Call your healthcare provider if you have any of these:   · Chest pain or feel dizzy or lightheaded  · Burning, tightness, pressure, or heaviness in your chest, neck, shoulders, back, or arms  · Abnormal shortness of breath  · More joint or muscle pain  · A very fast or irregular heartbeat (palpitations)  [employer] last reviewed this educational content on 7/1/2019  © 4238-5755 [employer]. [address] [facility] [facility], [place] [address]. All rights reserved. This information is not intended as a substitute for professional medical care. Always follow your healthcare professional's instructions.

## 2022-04-29 ENCOUNTER — ANTI-COAG VISIT (OUTPATIENT)
Dept: HEMATOLOGY/ONCOLOGY | Facility: HOSPITAL | Age: 52
End: 2022-04-29

## 2022-04-29 ENCOUNTER — TELEPHONE (OUTPATIENT)
Dept: HEMATOLOGY/ONCOLOGY | Facility: HOSPITAL | Age: 52
End: 2022-04-29

## 2022-04-29 LAB — INR: 1.4 (ref 0.8–1.2)

## 2022-04-29 NOTE — TELEPHONE ENCOUNTER
Spoke with patient. Instructed her to call OB/GYN ASAP. ER warnings provided to patient in regards to bleeding. Patient INR low.

## 2022-04-29 NOTE — TELEPHONE ENCOUNTER
Patient called and would like to speak with [facility]. Patient is spotting and patient has been in menopause since 2014. Patient also had a hysterectomy. Please call Denita at [phone] when you have a chance. Thank you.

## 2022-05-17 ENCOUNTER — OFFICE VISIT (OUTPATIENT)
Dept: OBGYN CLINIC | Facility: CLINIC | Age: 52
End: 2022-05-17
Payer: COMMERCIAL

## 2022-05-17 VITALS
WEIGHT: 208.13 LBS | HEART RATE: 79 BPM | DIASTOLIC BLOOD PRESSURE: 84 MMHG | BODY MASS INDEX: 38.3 KG/M2 | HEIGHT: 62 IN | SYSTOLIC BLOOD PRESSURE: 116 MMHG

## 2022-05-17 DIAGNOSIS — N95.0 PMB (POSTMENOPAUSAL BLEEDING): Primary | ICD-10-CM

## 2022-05-17 RX ORDER — POTASSIUM CHLORIDE 750 MG/1
10 TABLET, FILM COATED, EXTENDED RELEASE ORAL 2 TIMES DAILY WITH MEALS
COMMUNITY
Start: 2022-02-05

## 2022-05-17 RX ORDER — ONDANSETRON 4 MG/1
4 TABLET, ORALLY DISINTEGRATING ORAL EVERY 4 HOURS PRN
COMMUNITY
Start: 2022-02-22

## 2022-05-17 RX ORDER — BUDESONIDE AND FORMOTEROL FUMARATE DIHYDRATE 80; 4.5 UG/1; UG/1
AEROSOL RESPIRATORY (INHALATION)
COMMUNITY
Start: 2020-01-01

## 2022-05-17 RX ORDER — FERROUS SULFATE 325(65) MG
TABLET ORAL
COMMUNITY
Start: 2020-01-01

## 2022-05-17 RX ORDER — CYANOCOBALAMIN 1000 UG/ML
INJECTION INTRAMUSCULAR; SUBCUTANEOUS
COMMUNITY
Start: 2022-02-05

## 2022-05-17 RX ORDER — HYDROCORTISONE 10 MG/1
TABLET ORAL
COMMUNITY
Start: 2020-01-01

## 2022-05-17 RX ORDER — PANTOPRAZOLE SODIUM 20 MG/1
20 TABLET, DELAYED RELEASE ORAL DAILY
COMMUNITY
Start: 2022-03-29

## 2022-05-17 RX ORDER — MONTELUKAST SODIUM 10 MG/1
10 TABLET ORAL DAILY
COMMUNITY
Start: 2022-02-22

## 2022-05-17 RX ORDER — NEBULIZER ACCESSORIES
KIT MISCELLANEOUS
COMMUNITY

## 2022-05-17 RX ORDER — RANITIDINE 150 MG/1
CAPSULE ORAL
COMMUNITY
Start: 2020-01-01

## 2022-05-17 RX ORDER — CYCLOBENZAPRINE HCL 10 MG
TABLET ORAL
COMMUNITY
Start: 2022-04-17

## 2022-05-18 ENCOUNTER — PATIENT MESSAGE (OUTPATIENT)
Dept: RHEUMATOLOGY | Facility: CLINIC | Age: 52
End: 2022-05-18

## 2022-05-20 ENCOUNTER — ANTI-COAG VISIT (OUTPATIENT)
Dept: HEMATOLOGY/ONCOLOGY | Facility: HOSPITAL | Age: 52
End: 2022-05-20

## 2022-05-20 DIAGNOSIS — D68.61 ANTIPHOSPHOLIPID ANTIBODY SYNDROME (HCC): ICD-10-CM

## 2022-05-20 LAB — INR: 2.3 (ref 0.8–1.2)

## 2022-05-20 NOTE — TELEPHONE ENCOUNTER
PATIENT VERIFIED BY DATE OF BIRTH AND NAME. Patient has been consented for this telecommunication visit.
br 
18yoF presenting for follow up dysphagia and globus sensation. 
br She had an EGD with esophageal biopsies that was normal--no EoE.
br She was seen by ENT, had nasolaryngoscopy that was concerning for possible LPR.
br She had trial of Nexium for 2-3 months with no changes in her sx.
br he had EGD with [facility] that showed mild non acid reflux.
br
br She continues to have globus sensation that occurs at various times- with eating, drinking or even without any oral intake.
brHer symptoms are worse during her menstrual period. She has history of food allergies- milk and eggs dx as a child and she has nearly eliminated diary and does not eat eggs. She denies weight loss. No nausea, vomiting, reflux sx, abdominal pain etc.br br
br  Spoke with patient. She verbalized understanding.      CAT Pugh  P Edw Zofia Quinones Rns             Let her know mammogram is good

## 2022-06-10 ENCOUNTER — ANTI-COAG VISIT (OUTPATIENT)
Dept: HEMATOLOGY/ONCOLOGY | Facility: HOSPITAL | Age: 52
End: 2022-06-10

## 2022-06-10 DIAGNOSIS — D68.61 ANTIPHOSPHOLIPID ANTIBODY SYNDROME (HCC): ICD-10-CM

## 2022-06-10 LAB — INR: 2.3 (ref 0.8–1.2)

## 2022-06-10 RX ORDER — WARFARIN SODIUM 5 MG/1
TABLET ORAL NIGHTLY
Qty: 75 TABLET | Refills: 2 | Status: SHIPPED | OUTPATIENT
Start: 2022-06-10

## 2022-06-25 ENCOUNTER — HOSPITAL ENCOUNTER (OUTPATIENT)
Dept: MRI IMAGING | Age: 52
Discharge: HOME | End: 2022-06-25
Attending: FAMILY MEDICINE
Payer: COMMERCIAL

## 2022-06-25 DIAGNOSIS — M54.50 LOW BACK PAIN: ICD-10-CM

## 2022-06-25 PROCEDURE — 72148 MRI LUMBAR SPINE W/O DYE: CPT | Performed by: FAMILY MEDICINE

## 2022-06-29 ENCOUNTER — TELEPHONE (OUTPATIENT)
Dept: HEMATOLOGY/ONCOLOGY | Facility: HOSPITAL | Age: 52
End: 2022-06-29

## 2022-06-29 ENCOUNTER — ANTI-COAG VISIT (OUTPATIENT)
Dept: HEMATOLOGY/ONCOLOGY | Facility: HOSPITAL | Age: 52
End: 2022-06-29

## 2022-06-29 DIAGNOSIS — D68.61 ANTIPHOSPHOLIPID ANTIBODY SYNDROME (HCC): ICD-10-CM

## 2022-06-29 LAB — INR: 5.3 (ref 0.8–1.2)

## 2022-06-29 NOTE — TELEPHONE ENCOUNTER
Patient called to speak to [facility] about her INR levels. Please call when able. Thank you.

## 2022-06-29 NOTE — TELEPHONE ENCOUNTER
Spoke with patient regarding INR. Coumadin instructions provided. Stressed importance of compliance with coumadin and home testing. Reminded patient that home testing requires scheduled testing. She verbalized understanding and will follow-up this Friday 7/1/22.

## 2022-06-29 NOTE — TELEPHONE ENCOUNTER
CAT Lind  P Edw Bcn Joni Rns; CAT Lind  INR 5.6. Not feeling well the past few days. Held Coumadin last night. Needs a call with instructions. LVM for patient requesting call back. Contact information provided. [employer] message sent as well.

## 2022-07-01 ENCOUNTER — ANTI-COAG VISIT (OUTPATIENT)
Dept: HEMATOLOGY/ONCOLOGY | Facility: HOSPITAL | Age: 52
End: 2022-07-01

## 2022-07-01 DIAGNOSIS — D68.61 ANTIPHOSPHOLIPID ANTIBODY SYNDROME (HCC): ICD-10-CM

## 2022-07-01 LAB — INR: 3.8 (ref 0.8–1.2)

## 2022-07-06 ENCOUNTER — TELEPHONE (OUTPATIENT)
Dept: HEMATOLOGY/ONCOLOGY | Facility: HOSPITAL | Age: 52
End: 2022-07-06

## 2022-07-06 ENCOUNTER — ANTI-COAG VISIT (OUTPATIENT)
Dept: HEMATOLOGY/ONCOLOGY | Facility: HOSPITAL | Age: 52
End: 2022-07-06
Attending: INTERNAL MEDICINE
Payer: COMMERCIAL

## 2022-07-06 DIAGNOSIS — M32.19 OTHER SYSTEMIC LUPUS ERYTHEMATOSUS WITH OTHER ORGAN INVOLVEMENT (HCC): Primary | ICD-10-CM

## 2022-07-06 DIAGNOSIS — D68.61 ANTIPHOSPHOLIPID ANTIBODY SYNDROME (HCC): ICD-10-CM

## 2022-07-06 LAB
BASOPHILS # BLD AUTO: 0.02 X10(3) UL (ref 0–0.2)
BASOPHILS NFR BLD AUTO: 0.3 %
EOSINOPHIL # BLD AUTO: 0.03 X10(3) UL (ref 0–0.7)
EOSINOPHIL NFR BLD AUTO: 0.4 %
ERYTHROCYTE [DISTWIDTH] IN BLOOD BY AUTOMATED COUNT: 13.3 %
HCT VFR BLD AUTO: 39.1 %
HGB BLD-MCNC: 12.2 G/DL
IMM GRANULOCYTES # BLD AUTO: 0.02 X10(3) UL (ref 0–1)
IMM GRANULOCYTES NFR BLD: 0.3 %
INR BLD: 10.96 (ref 0.8–1.2)
LYMPHOCYTES # BLD AUTO: 2.23 X10(3) UL (ref 1–4)
LYMPHOCYTES NFR BLD AUTO: 31.8 %
MCH RBC QN AUTO: 28.7 PG (ref 26–34)
MCHC RBC AUTO-ENTMCNC: 31.2 G/DL (ref 31–37)
MCV RBC AUTO: 92 FL
MONOCYTES # BLD AUTO: 0.7 X10(3) UL (ref 0.1–1)
MONOCYTES NFR BLD AUTO: 10 %
NEUTROPHILS # BLD AUTO: 4.02 X10 (3) UL (ref 1.5–7.7)
NEUTROPHILS # BLD AUTO: 4.02 X10(3) UL (ref 1.5–7.7)
NEUTROPHILS NFR BLD AUTO: 57.2 %
PLATELET # BLD AUTO: 214 10(3)UL (ref 150–450)
PROTHROMBIN TIME: 87 SECONDS (ref 11.6–14.8)
RBC # BLD AUTO: 4.25 X10(6)UL
WBC # BLD AUTO: 7 X10(3) UL (ref 4–11)

## 2022-07-06 PROCEDURE — 36415 COLL VENOUS BLD VENIPUNCTURE: CPT

## 2022-07-06 PROCEDURE — 36416 COLLJ CAPILLARY BLOOD SPEC: CPT

## 2022-07-06 PROCEDURE — 85025 COMPLETE CBC W/AUTO DIFF WBC: CPT

## 2022-07-06 PROCEDURE — 85610 PROTHROMBIN TIME: CPT

## 2022-07-06 NOTE — TELEPHONE ENCOUNTER
Spoke with patient. She will come at 230 today and bring INR machine. PSR to add patient on for INR appt.

## 2022-07-06 NOTE — TELEPHONE ENCOUNTER
Received several messages from patient she is unable to get reading on her machine at home. Left general message on cell number for her to call back to trouble shoot.

## 2022-07-08 ENCOUNTER — TELEPHONE (OUTPATIENT)
Dept: HEMATOLOGY/ONCOLOGY | Facility: HOSPITAL | Age: 52
End: 2022-07-08

## 2022-07-08 NOTE — TELEPHONE ENCOUNTER
Patient's  called and said that his wife went to [facility] last night. The ER doctors took a test to see if the patent has blood clots. The results of the test are not in yet. Patient's  wanted Dr. Dheeraj Gillespie to know that the patient is in the hospital. Patient's  Octavio's phone number is [phone]. Thank you.

## 2022-07-14 ENCOUNTER — ANTI-COAG VISIT (OUTPATIENT)
Dept: HEMATOLOGY/ONCOLOGY | Facility: HOSPITAL | Age: 52
End: 2022-07-14

## 2022-07-14 DIAGNOSIS — D68.61 ANTIPHOSPHOLIPID ANTIBODY SYNDROME (HCC): ICD-10-CM

## 2022-07-14 LAB — INR: 3.7 (ref 0.8–1.2)

## 2022-07-18 ENCOUNTER — ANTI-COAG VISIT (OUTPATIENT)
Dept: HEMATOLOGY/ONCOLOGY | Facility: HOSPITAL | Age: 52
End: 2022-07-18

## 2022-07-18 ENCOUNTER — TELEPHONE (OUTPATIENT)
Dept: HEMATOLOGY/ONCOLOGY | Facility: HOSPITAL | Age: 52
End: 2022-07-18

## 2022-07-18 DIAGNOSIS — D68.61 ANTIPHOSPHOLIPID ANTIBODY SYNDROME (HCC): ICD-10-CM

## 2022-07-18 LAB — INR: 1.2 (ref 0.8–1.2)

## 2022-07-21 ENCOUNTER — OFFICE VISIT (OUTPATIENT)
Dept: HEMATOLOGY/ONCOLOGY | Facility: HOSPITAL | Age: 52
End: 2022-07-21
Attending: INTERNAL MEDICINE
Payer: COMMERCIAL

## 2022-07-21 ENCOUNTER — ANTI-COAG VISIT (OUTPATIENT)
Dept: HEMATOLOGY/ONCOLOGY | Facility: HOSPITAL | Age: 52
End: 2022-07-21

## 2022-07-21 VITALS
RESPIRATION RATE: 18 BRPM | DIASTOLIC BLOOD PRESSURE: 82 MMHG | SYSTOLIC BLOOD PRESSURE: 127 MMHG | BODY MASS INDEX: 37 KG/M2 | TEMPERATURE: 97 F | WEIGHT: 202.25 LBS | OXYGEN SATURATION: 98 % | HEART RATE: 84 BPM

## 2022-07-21 DIAGNOSIS — D68.61 ANTIPHOSPHOLIPID ANTIBODY SYNDROME (HCC): ICD-10-CM

## 2022-07-21 DIAGNOSIS — M32.19 OTHER SYSTEMIC LUPUS ERYTHEMATOSUS WITH OTHER ORGAN INVOLVEMENT (HCC): ICD-10-CM

## 2022-07-21 LAB
INR BLD: 1.86 (ref 0.8–1.2)
INR: 2.2 (ref 0.8–1.3)
PROTHROMBIN TIME: 21.5 SECONDS (ref 11.6–14.8)

## 2022-07-21 PROCEDURE — 99214 OFFICE O/P EST MOD 30 MIN: CPT | Performed by: INTERNAL MEDICINE

## 2022-07-21 NOTE — PROGRESS NOTES
Education Record    Learner:  Patient    Disease / Diagnosis:antiphospholipid antibody syndrome     Barriers / Limitations:  None   Comments:    Method:  Discussion   Comments:    General Topics:  Plan of care reviewed   Comments:    Outcome:  Shows understanding   Comments:    Patient here for follow-up. Remains on coumadin. Denies any signs of new active bleeding. Recently hospitalized. Here to discuss next steps in regards to coumadin management.

## 2022-07-29 ENCOUNTER — ANTI-COAG VISIT (OUTPATIENT)
Dept: HEMATOLOGY/ONCOLOGY | Facility: HOSPITAL | Age: 52
End: 2022-07-29

## 2022-07-29 DIAGNOSIS — D68.61 ANTIPHOSPHOLIPID ANTIBODY SYNDROME (HCC): ICD-10-CM

## 2022-07-29 LAB — INR: 5.5 (ref 0.8–1.2)

## 2022-08-05 ENCOUNTER — ANTI-COAG VISIT (OUTPATIENT)
Dept: HEMATOLOGY/ONCOLOGY | Facility: HOSPITAL | Age: 52
End: 2022-08-05

## 2022-08-05 ENCOUNTER — TELEPHONE (OUTPATIENT)
Dept: HEMATOLOGY/ONCOLOGY | Facility: HOSPITAL | Age: 52
End: 2022-08-05

## 2022-08-05 DIAGNOSIS — D68.61 ANTIPHOSPHOLIPID ANTIBODY SYNDROME (HCC): ICD-10-CM

## 2022-08-05 LAB — INR: 3.8 (ref 0.8–1.2)

## 2022-08-12 ENCOUNTER — HOSPITAL ENCOUNTER (OUTPATIENT)
Dept: MRI IMAGING | Age: 52
Discharge: HOME | End: 2022-08-12
Attending: INTERNAL MEDICINE
Payer: COMMERCIAL

## 2022-08-12 ENCOUNTER — ANTI-COAG VISIT (OUTPATIENT)
Dept: HEMATOLOGY/ONCOLOGY | Facility: HOSPITAL | Age: 52
End: 2022-08-12

## 2022-08-12 DIAGNOSIS — D68.61 ANTIPHOSPHOLIPID ANTIBODY SYNDROME (HCC): ICD-10-CM

## 2022-08-12 DIAGNOSIS — M32.19 OTHER SYSTEMIC LUPUS ERYTHEMATOSUS WITH OTHER ORGAN INVOLVEMENT (HCC): ICD-10-CM

## 2022-08-12 LAB
CREAT BLD-MCNC: 0.7 MG/DL
GFR SERPLBLD BASED ON 1.73 SQ M-ARVRAT: 105 ML/MIN/1.73M2 (ref 60–?)
INR: 2.1 (ref 0.8–1.2)

## 2022-08-12 PROCEDURE — 82565 ASSAY OF CREATININE: CPT

## 2022-08-12 PROCEDURE — 74183 MRI ABD W/O CNTR FLWD CNTR: CPT | Performed by: INTERNAL MEDICINE

## 2022-08-12 PROCEDURE — A9581 GADOXETATE DISODIUM INJ: HCPCS | Performed by: INTERNAL MEDICINE

## 2022-08-22 ENCOUNTER — ANTI-COAG VISIT (OUTPATIENT)
Dept: HEMATOLOGY/ONCOLOGY | Facility: HOSPITAL | Age: 52
End: 2022-08-22

## 2022-08-22 DIAGNOSIS — D68.61 ANTIPHOSPHOLIPID ANTIBODY SYNDROME (HCC): ICD-10-CM

## 2022-08-22 LAB — INR: 1.9 (ref 0.8–1.2)

## 2022-08-29 ENCOUNTER — ANTI-COAG VISIT (OUTPATIENT)
Dept: HEMATOLOGY/ONCOLOGY | Facility: HOSPITAL | Age: 52
End: 2022-08-29

## 2022-08-29 ENCOUNTER — TELEPHONE (OUTPATIENT)
Dept: RHEUMATOLOGY | Facility: CLINIC | Age: 52
End: 2022-08-29

## 2022-08-29 DIAGNOSIS — D68.61 ANTIPHOSPHOLIPID ANTIBODY SYNDROME (HCC): ICD-10-CM

## 2022-08-29 LAB — INR: 2.8 (ref 0.8–1.2)

## 2022-08-29 NOTE — TELEPHONE ENCOUNTER
Called pt and left a voicemail stating that she missed her 315pm appointment on 8/29/2022 and to call the office to reschedule

## 2022-09-16 ENCOUNTER — ANTI-COAG VISIT (OUTPATIENT)
Dept: HEMATOLOGY/ONCOLOGY | Facility: HOSPITAL | Age: 52
End: 2022-09-16

## 2022-09-16 DIAGNOSIS — D68.61 ANTIPHOSPHOLIPID ANTIBODY SYNDROME (HCC): ICD-10-CM

## 2022-09-16 LAB — INR: 2.5 (ref 0.8–1.2)

## 2022-09-19 DIAGNOSIS — D68.61 ANTIPHOSPHOLIPID ANTIBODY SYNDROME (HCC): ICD-10-CM

## 2022-09-19 RX ORDER — WARFARIN SODIUM 5 MG/1
TABLET ORAL NIGHTLY
Qty: 75 TABLET | Refills: 2 | Status: SHIPPED | OUTPATIENT
Start: 2022-09-19

## 2022-09-19 RX ORDER — WARFARIN SODIUM 2.5 MG/1
2.5 TABLET ORAL NIGHTLY
Qty: 30 TABLET | Refills: 0 | Status: SHIPPED | OUTPATIENT
Start: 2022-09-19

## 2022-09-20 ENCOUNTER — HOSPITAL ENCOUNTER (EMERGENCY)
Facility: HOSPITAL | Age: 52
Discharge: HOME | End: 2022-09-20
Attending: EMERGENCY MEDICINE

## 2022-09-20 ENCOUNTER — APPOINTMENT (OUTPATIENT)
Dept: CT IMAGING | Facility: HOSPITAL | Age: 52
End: 2022-09-20
Attending: EMERGENCY MEDICINE

## 2022-09-20 VITALS
TEMPERATURE: 97 F | DIASTOLIC BLOOD PRESSURE: 92 MMHG | WEIGHT: 199 LBS | HEIGHT: 62 IN | SYSTOLIC BLOOD PRESSURE: 138 MMHG | RESPIRATION RATE: 21 BRPM | BODY MASS INDEX: 36.62 KG/M2 | OXYGEN SATURATION: 100 % | HEART RATE: 73 BPM

## 2022-09-20 DIAGNOSIS — R42 EPISODIC LIGHTHEADEDNESS: Primary | ICD-10-CM

## 2022-09-20 DIAGNOSIS — G43.909 MIGRAINE WITHOUT STATUS MIGRAINOSUS, NOT INTRACTABLE, UNSPECIFIED MIGRAINE TYPE: ICD-10-CM

## 2022-09-20 LAB
ALBUMIN SERPL-MCNC: 3.5 G/DL (ref 3.4–5)
ALBUMIN/GLOB SERPL: 1.3 {RATIO} (ref 1–2)
ALP LIVER SERPL-CCNC: 77 U/L
ALT SERPL-CCNC: 15 U/L
ANION GAP SERPL CALC-SCNC: 4 MMOL/L (ref 0–18)
APTT PPP: 37.5 SECONDS (ref 23.3–35.6)
AST SERPL-CCNC: 16 U/L (ref 15–37)
BASOPHILS # BLD AUTO: 0.02 X10(3) UL (ref 0–0.2)
BASOPHILS NFR BLD AUTO: 0.3 %
BILIRUB SERPL-MCNC: 0.4 MG/DL (ref 0.1–2)
BUN BLD-MCNC: 12 MG/DL (ref 7–18)
CALCIUM BLD-MCNC: 8.9 MG/DL (ref 8.5–10.1)
CHLORIDE SERPL-SCNC: 111 MMOL/L (ref 98–112)
CO2 SERPL-SCNC: 27 MMOL/L (ref 21–32)
CREAT BLD-MCNC: 0.87 MG/DL
EOSINOPHIL # BLD AUTO: 0.12 X10(3) UL (ref 0–0.7)
EOSINOPHIL NFR BLD AUTO: 1.5 %
ERYTHROCYTE [DISTWIDTH] IN BLOOD BY AUTOMATED COUNT: 13.5 %
GFR SERPLBLD BASED ON 1.73 SQ M-ARVRAT: 81 ML/MIN/1.73M2 (ref 60–?)
GLOBULIN PLAS-MCNC: 2.7 G/DL (ref 2.8–4.4)
GLUCOSE BLD-MCNC: 88 MG/DL (ref 70–99)
HCT VFR BLD AUTO: 43.6 %
HGB BLD-MCNC: 13.3 G/DL
IMM GRANULOCYTES # BLD AUTO: 0.02 X10(3) UL (ref 0–1)
IMM GRANULOCYTES NFR BLD: 0.3 %
INR BLD: 3 (ref 0.85–1.16)
LYMPHOCYTES # BLD AUTO: 2.23 X10(3) UL (ref 1–4)
LYMPHOCYTES NFR BLD AUTO: 27.9 %
MCH RBC QN AUTO: 28 PG (ref 26–34)
MCHC RBC AUTO-ENTMCNC: 30.5 G/DL (ref 31–37)
MCV RBC AUTO: 91.8 FL
MONOCYTES # BLD AUTO: 0.58 X10(3) UL (ref 0.1–1)
MONOCYTES NFR BLD AUTO: 7.3 %
NEUTROPHILS # BLD AUTO: 5.01 X10 (3) UL (ref 1.5–7.7)
NEUTROPHILS # BLD AUTO: 5.01 X10(3) UL (ref 1.5–7.7)
NEUTROPHILS NFR BLD AUTO: 62.7 %
OSMOLALITY SERPL CALC.SUM OF ELEC: 293 MOSM/KG (ref 275–295)
PLATELET # BLD AUTO: 237 10(3)UL (ref 150–450)
POTASSIUM SERPL-SCNC: 4.3 MMOL/L (ref 3.5–5.1)
PROT SERPL-MCNC: 6.2 G/DL (ref 6.4–8.2)
PROTHROMBIN TIME: 30.8 SECONDS (ref 11.6–14.8)
RBC # BLD AUTO: 4.75 X10(6)UL
SARS-COV-2 RNA RESP QL NAA+PROBE: NOT DETECTED
SODIUM SERPL-SCNC: 142 MMOL/L (ref 136–145)
TROPONIN I HIGH SENSITIVITY: 4 NG/L
WBC # BLD AUTO: 8 X10(3) UL (ref 4–11)

## 2022-09-20 PROCEDURE — 85025 COMPLETE CBC W/AUTO DIFF WBC: CPT | Performed by: EMERGENCY MEDICINE

## 2022-09-20 PROCEDURE — 93010 ELECTROCARDIOGRAM REPORT: CPT

## 2022-09-20 PROCEDURE — 96374 THER/PROPH/DIAG INJ IV PUSH: CPT

## 2022-09-20 PROCEDURE — 99285 EMERGENCY DEPT VISIT HI MDM: CPT

## 2022-09-20 PROCEDURE — 96361 HYDRATE IV INFUSION ADD-ON: CPT

## 2022-09-20 PROCEDURE — 93005 ELECTROCARDIOGRAM TRACING: CPT

## 2022-09-20 PROCEDURE — 85730 THROMBOPLASTIN TIME PARTIAL: CPT | Performed by: EMERGENCY MEDICINE

## 2022-09-20 PROCEDURE — 84484 ASSAY OF TROPONIN QUANT: CPT | Performed by: EMERGENCY MEDICINE

## 2022-09-20 PROCEDURE — 70450 CT HEAD/BRAIN W/O DYE: CPT | Performed by: EMERGENCY MEDICINE

## 2022-09-20 PROCEDURE — 80053 COMPREHEN METABOLIC PANEL: CPT | Performed by: EMERGENCY MEDICINE

## 2022-09-20 PROCEDURE — 99284 EMERGENCY DEPT VISIT MOD MDM: CPT

## 2022-09-20 PROCEDURE — 85610 PROTHROMBIN TIME: CPT | Performed by: EMERGENCY MEDICINE

## 2022-09-20 RX ORDER — ONDANSETRON 2 MG/ML
4 INJECTION INTRAMUSCULAR; INTRAVENOUS ONCE
Status: COMPLETED | OUTPATIENT
Start: 2022-09-20 | End: 2022-09-20

## 2022-09-21 LAB
ATRIAL RATE: 67 BPM
P AXIS: 39 DEGREES
P-R INTERVAL: 154 MS
Q-T INTERVAL: 424 MS
QRS DURATION: 82 MS
QTC CALCULATION (BEZET): 448 MS
R AXIS: 14 DEGREES
T AXIS: 11 DEGREES
VENTRICULAR RATE: 67 BPM

## 2022-09-21 NOTE — ED INITIAL ASSESSMENT (HPI)
Pt c/o HA, lightheadedness, feeling fatigued and generally weak for 2 days. States gets occas KIKI, blurry vision, lips tingling.  Pt denies n/v, denies CP

## 2022-09-27 ENCOUNTER — TELEPHONE (OUTPATIENT)
Dept: HEMATOLOGY/ONCOLOGY | Facility: HOSPITAL | Age: 52
End: 2022-09-27

## 2022-10-03 ENCOUNTER — TELEPHONE (OUTPATIENT)
Dept: HEMATOLOGY/ONCOLOGY | Facility: HOSPITAL | Age: 52
End: 2022-10-03

## 2022-10-03 NOTE — TELEPHONE ENCOUNTER
Patient is calling to give her INR results which is 1.2. She also stated she needs authorization today so she can have her surgery on her back.

## 2022-10-04 ENCOUNTER — ANTI-COAG VISIT (OUTPATIENT)
Dept: HEMATOLOGY/ONCOLOGY | Facility: HOSPITAL | Age: 52
End: 2022-10-04

## 2022-10-04 DIAGNOSIS — D68.61 ANTIPHOSPHOLIPID ANTIBODY SYNDROME (HCC): ICD-10-CM

## 2022-10-04 LAB — INR: 1.1 (ref 0.8–1.2)

## 2022-10-10 ENCOUNTER — ANTI-COAG VISIT (OUTPATIENT)
Dept: HEMATOLOGY/ONCOLOGY | Facility: HOSPITAL | Age: 52
End: 2022-10-10

## 2022-10-10 DIAGNOSIS — D68.61 ANTIPHOSPHOLIPID ANTIBODY SYNDROME (HCC): ICD-10-CM

## 2022-10-10 LAB — INR: 2.3 (ref 0.8–1.2)

## 2022-10-15 ENCOUNTER — HOSPITAL ENCOUNTER (EMERGENCY)
Age: 52
Discharge: HOME | End: 2022-10-16
Attending: EMERGENCY MEDICINE
Payer: COMMERCIAL

## 2022-10-15 VITALS
OXYGEN SATURATION: 99 % | SYSTOLIC BLOOD PRESSURE: 119 MMHG | TEMPERATURE: 98 F | DIASTOLIC BLOOD PRESSURE: 86 MMHG | WEIGHT: 199 LBS | HEART RATE: 86 BPM | RESPIRATION RATE: 18 BRPM | BODY MASS INDEX: 36 KG/M2

## 2022-10-15 DIAGNOSIS — R10.9 ABDOMINAL PAIN OF UNKNOWN ETIOLOGY: Primary | ICD-10-CM

## 2022-10-15 PROCEDURE — 96361 HYDRATE IV INFUSION ADD-ON: CPT

## 2022-10-15 PROCEDURE — 99284 EMERGENCY DEPT VISIT MOD MDM: CPT

## 2022-10-15 PROCEDURE — 96374 THER/PROPH/DIAG INJ IV PUSH: CPT

## 2022-10-16 ENCOUNTER — APPOINTMENT (OUTPATIENT)
Dept: CT IMAGING | Age: 52
End: 2022-10-16
Attending: EMERGENCY MEDICINE
Payer: COMMERCIAL

## 2022-10-16 LAB
ALBUMIN SERPL-MCNC: 3.3 G/DL (ref 3.4–5)
ALBUMIN/GLOB SERPL: 1.1 {RATIO} (ref 1–2)
ALP LIVER SERPL-CCNC: 57 U/L
ALT SERPL-CCNC: 12 U/L
ANION GAP SERPL CALC-SCNC: 6 MMOL/L (ref 0–18)
AST SERPL-CCNC: 9 U/L (ref 15–37)
BASOPHILS # BLD AUTO: 0.01 X10(3) UL (ref 0–0.2)
BASOPHILS NFR BLD AUTO: 0.1 %
BILIRUB SERPL-MCNC: 0.5 MG/DL (ref 0.1–2)
BILIRUB UR QL STRIP.AUTO: NEGATIVE
BUN BLD-MCNC: 18 MG/DL (ref 7–18)
CALCIUM BLD-MCNC: 8.6 MG/DL (ref 8.5–10.1)
CHLORIDE SERPL-SCNC: 113 MMOL/L (ref 98–112)
CLARITY UR REFRACT.AUTO: CLEAR
CO2 SERPL-SCNC: 25 MMOL/L (ref 21–32)
COLOR UR AUTO: YELLOW
CREAT BLD-MCNC: 0.87 MG/DL
EOSINOPHIL # BLD AUTO: 0.11 X10(3) UL (ref 0–0.7)
EOSINOPHIL NFR BLD AUTO: 1.3 %
ERYTHROCYTE [DISTWIDTH] IN BLOOD BY AUTOMATED COUNT: 14.7 %
GFR SERPLBLD BASED ON 1.73 SQ M-ARVRAT: 81 ML/MIN/1.73M2 (ref 60–?)
GLOBULIN PLAS-MCNC: 2.9 G/DL (ref 2.8–4.4)
GLUCOSE BLD-MCNC: 94 MG/DL (ref 70–99)
GLUCOSE UR STRIP.AUTO-MCNC: NEGATIVE MG/DL
HCT VFR BLD AUTO: 43 %
HGB BLD-MCNC: 13.5 G/DL
IMM GRANULOCYTES # BLD AUTO: 0.03 X10(3) UL (ref 0–1)
IMM GRANULOCYTES NFR BLD: 0.3 %
KETONES UR STRIP.AUTO-MCNC: NEGATIVE MG/DL
LEUKOCYTE ESTERASE UR QL STRIP.AUTO: NEGATIVE
LIPASE SERPL-CCNC: 155 U/L (ref 73–393)
LYMPHOCYTES # BLD AUTO: 1.39 X10(3) UL (ref 1–4)
LYMPHOCYTES NFR BLD AUTO: 15.9 %
MCH RBC QN AUTO: 28.6 PG (ref 26–34)
MCHC RBC AUTO-ENTMCNC: 31.4 G/DL (ref 31–37)
MCV RBC AUTO: 91.1 FL
MONOCYTES # BLD AUTO: 0.57 X10(3) UL (ref 0.1–1)
MONOCYTES NFR BLD AUTO: 6.5 %
NEUTROPHILS # BLD AUTO: 6.62 X10 (3) UL (ref 1.5–7.7)
NEUTROPHILS # BLD AUTO: 6.62 X10(3) UL (ref 1.5–7.7)
NEUTROPHILS NFR BLD AUTO: 75.9 %
NITRITE UR QL STRIP.AUTO: NEGATIVE
OSMOLALITY SERPL CALC.SUM OF ELEC: 300 MOSM/KG (ref 275–295)
PH UR STRIP.AUTO: 5 [PH] (ref 5–8)
PLATELET # BLD AUTO: 243 10(3)UL (ref 150–450)
POTASSIUM SERPL-SCNC: 4.2 MMOL/L (ref 3.5–5.1)
PROT SERPL-MCNC: 6.2 G/DL (ref 6.4–8.2)
PROT UR STRIP.AUTO-MCNC: NEGATIVE MG/DL
RBC # BLD AUTO: 4.72 X10(6)UL
RBC UR QL AUTO: NEGATIVE
SODIUM SERPL-SCNC: 144 MMOL/L (ref 136–145)
SP GR UR STRIP.AUTO: >=1.03 (ref 1–1.03)
UROBILINOGEN UR STRIP.AUTO-MCNC: 0.2 MG/DL
WBC # BLD AUTO: 8.7 X10(3) UL (ref 4–11)

## 2022-10-16 PROCEDURE — 74176 CT ABD & PELVIS W/O CONTRAST: CPT | Performed by: EMERGENCY MEDICINE

## 2022-10-16 PROCEDURE — 80053 COMPREHEN METABOLIC PANEL: CPT | Performed by: EMERGENCY MEDICINE

## 2022-10-16 PROCEDURE — 85025 COMPLETE CBC W/AUTO DIFF WBC: CPT | Performed by: EMERGENCY MEDICINE

## 2022-10-16 PROCEDURE — 83690 ASSAY OF LIPASE: CPT | Performed by: EMERGENCY MEDICINE

## 2022-10-16 PROCEDURE — 81003 URINALYSIS AUTO W/O SCOPE: CPT | Performed by: EMERGENCY MEDICINE

## 2022-10-16 RX ORDER — ONDANSETRON 2 MG/ML
4 INJECTION INTRAMUSCULAR; INTRAVENOUS ONCE
Status: COMPLETED | OUTPATIENT
Start: 2022-10-16 | End: 2022-10-16

## 2022-10-18 ENCOUNTER — ANTI-COAG VISIT (OUTPATIENT)
Dept: HEMATOLOGY/ONCOLOGY | Facility: HOSPITAL | Age: 52
End: 2022-10-18

## 2022-10-18 ENCOUNTER — PATIENT MESSAGE (OUTPATIENT)
Dept: HEMATOLOGY/ONCOLOGY | Facility: HOSPITAL | Age: 52
End: 2022-10-18

## 2022-10-18 DIAGNOSIS — D68.61 ANTIPHOSPHOLIPID ANTIBODY SYNDROME (HCC): ICD-10-CM

## 2022-10-18 LAB — INR: 2 (ref 0.8–1.2)

## 2022-10-27 ENCOUNTER — ANTI-COAG VISIT (OUTPATIENT)
Dept: HEMATOLOGY/ONCOLOGY | Facility: HOSPITAL | Age: 52
End: 2022-10-27

## 2022-10-27 DIAGNOSIS — D68.61 ANTIPHOSPHOLIPID ANTIBODY SYNDROME (HCC): ICD-10-CM

## 2022-10-27 LAB — INR: 2.5 (ref 0.8–1.2)

## 2022-11-10 ENCOUNTER — ANTI-COAG VISIT (OUTPATIENT)
Dept: HEMATOLOGY/ONCOLOGY | Facility: HOSPITAL | Age: 52
End: 2022-11-10

## 2022-11-10 DIAGNOSIS — D68.61 ANTIPHOSPHOLIPID ANTIBODY SYNDROME (HCC): ICD-10-CM

## 2022-11-10 LAB — INR: 1.1 (ref 0.8–1.2)

## 2022-11-15 ENCOUNTER — ANTI-COAG VISIT (OUTPATIENT)
Dept: HEMATOLOGY/ONCOLOGY | Facility: HOSPITAL | Age: 52
End: 2022-11-15

## 2022-11-15 DIAGNOSIS — D68.61 ANTIPHOSPHOLIPID ANTIBODY SYNDROME (HCC): ICD-10-CM

## 2022-11-15 LAB — INR: 2.1 (ref 0.8–1.2)

## 2022-11-23 ENCOUNTER — ANTI-COAG VISIT (OUTPATIENT)
Dept: HEMATOLOGY/ONCOLOGY | Facility: HOSPITAL | Age: 52
End: 2022-11-23

## 2022-11-23 DIAGNOSIS — D68.61 ANTIPHOSPHOLIPID ANTIBODY SYNDROME (HCC): ICD-10-CM

## 2022-11-23 LAB — INR: 3 (ref 0.8–1.2)

## 2022-12-05 ENCOUNTER — TELEPHONE (OUTPATIENT)
Dept: HEMATOLOGY/ONCOLOGY | Facility: HOSPITAL | Age: 52
End: 2022-12-05

## 2022-12-05 ENCOUNTER — ANTI-COAG VISIT (OUTPATIENT)
Dept: HEMATOLOGY/ONCOLOGY | Facility: HOSPITAL | Age: 52
End: 2022-12-05

## 2022-12-05 ENCOUNTER — HOSPITAL ENCOUNTER (OUTPATIENT)
Dept: ULTRASOUND IMAGING | Facility: HOSPITAL | Age: 52
Discharge: HOME | End: 2022-12-05
Attending: NURSE PRACTITIONER
Payer: COMMERCIAL

## 2022-12-05 DIAGNOSIS — D68.61 ANTIPHOSPHOLIPID ANTIBODY SYNDROME (HCC): ICD-10-CM

## 2022-12-05 LAB — INR: 1.1 (ref 0.8–1.2)

## 2022-12-05 PROCEDURE — 93971 EXTREMITY STUDY: CPT | Performed by: NURSE PRACTITIONER

## 2022-12-05 NOTE — TELEPHONE ENCOUNTER
LVM for patient requesting call back to discuss INR results. Spoke with APN, patient needs ultrasound performed this week. Will discuss coumadin adjustments with patient once she calls back. Contact information provided.

## 2022-12-13 ENCOUNTER — ANTI-COAG VISIT (OUTPATIENT)
Dept: HEMATOLOGY/ONCOLOGY | Facility: HOSPITAL | Age: 52
End: 2022-12-13

## 2022-12-13 DIAGNOSIS — D68.61 ANTIPHOSPHOLIPID ANTIBODY SYNDROME (HCC): ICD-10-CM

## 2022-12-13 LAB — INR: 2.2 (ref 0.8–1.2)

## 2023-01-03 ENCOUNTER — ANTI-COAG VISIT (OUTPATIENT)
Dept: HEMATOLOGY/ONCOLOGY | Facility: HOSPITAL | Age: 53
End: 2023-01-03

## 2023-01-03 DIAGNOSIS — D68.61 ANTIPHOSPHOLIPID ANTIBODY SYNDROME (HCC): ICD-10-CM

## 2023-01-03 LAB — INR: 2.1 (ref 0.8–1.2)

## 2023-01-03 RX ORDER — WARFARIN SODIUM 10 MG/1
TABLET ORAL NIGHTLY
Qty: 30 TABLET | Refills: 0 | Status: SHIPPED | OUTPATIENT
Start: 2023-01-03

## 2023-01-10 ENCOUNTER — TELEPHONE (OUTPATIENT)
Dept: HEMATOLOGY/ONCOLOGY | Facility: HOSPITAL | Age: 53
End: 2023-01-10

## 2023-01-10 ENCOUNTER — ANTI-COAG VISIT (OUTPATIENT)
Dept: HEMATOLOGY/ONCOLOGY | Facility: HOSPITAL | Age: 53
End: 2023-01-10

## 2023-01-10 DIAGNOSIS — D68.61 ANTIPHOSPHOLIPID ANTIBODY SYNDROME (HCC): ICD-10-CM

## 2023-01-10 LAB — INR: 8 (ref 0.8–1.2)

## 2023-01-10 NOTE — TELEPHONE ENCOUNTER
Spoke with patient. Denies any signs of bleeding. Will come tomorrow morning for a PL PT/INR. Instructed patient to hold coumadin tonight per MD recommendation. Instructed patient to go to ER if any signs of bleeding per MD recommendation. Patient verbalized understanding of plan.

## 2023-01-10 NOTE — TELEPHONE ENCOUNTER
LVM for patient requesting call back. Patient sent [address] stating her INR at home was 8.0 last night. Will await call back to further discuss. Contact information provided.

## 2023-01-11 ENCOUNTER — ANTI-COAG VISIT (OUTPATIENT)
Dept: HEMATOLOGY/ONCOLOGY | Facility: HOSPITAL | Age: 53
End: 2023-01-11

## 2023-01-11 ENCOUNTER — NURSE ONLY (OUTPATIENT)
Dept: HEMATOLOGY/ONCOLOGY | Age: 53
End: 2023-01-11
Attending: INTERNAL MEDICINE
Payer: COMMERCIAL

## 2023-01-11 DIAGNOSIS — D68.61 ANTIPHOSPHOLIPID ANTIBODY SYNDROME (HCC): ICD-10-CM

## 2023-01-11 DIAGNOSIS — M32.19 OTHER SYSTEMIC LUPUS ERYTHEMATOSUS WITH OTHER ORGAN INVOLVEMENT (HCC): ICD-10-CM

## 2023-01-11 LAB
INR BLD: 6.6 (ref 0.85–1.16)
PROTHROMBIN TIME: 56.4 SECONDS (ref 11.6–14.8)

## 2023-01-11 PROCEDURE — 85610 PROTHROMBIN TIME: CPT

## 2023-01-11 PROCEDURE — 36415 COLL VENOUS BLD VENIPUNCTURE: CPT

## 2023-01-18 ENCOUNTER — APPOINTMENT (OUTPATIENT)
Dept: HEMATOLOGY/ONCOLOGY | Age: 53
End: 2023-01-18
Attending: INTERNAL MEDICINE
Payer: COMMERCIAL

## 2023-01-19 ENCOUNTER — OFFICE VISIT (OUTPATIENT)
Dept: HEMATOLOGY/ONCOLOGY | Facility: HOSPITAL | Age: 53
End: 2023-01-19
Attending: INTERNAL MEDICINE
Payer: COMMERCIAL

## 2023-01-19 VITALS
WEIGHT: 202 LBS | DIASTOLIC BLOOD PRESSURE: 83 MMHG | HEART RATE: 92 BPM | OXYGEN SATURATION: 99 % | RESPIRATION RATE: 18 BRPM | TEMPERATURE: 97 F | BODY MASS INDEX: 37 KG/M2 | SYSTOLIC BLOOD PRESSURE: 124 MMHG

## 2023-01-19 DIAGNOSIS — D68.61 ANTIPHOSPHOLIPID ANTIBODY SYNDROME (HCC): Primary | ICD-10-CM

## 2023-01-19 DIAGNOSIS — M32.19 OTHER SYSTEMIC LUPUS ERYTHEMATOSUS WITH OTHER ORGAN INVOLVEMENT (HCC): ICD-10-CM

## 2023-01-19 LAB
INR BLD: 2.23 (ref 0.85–1.16)
PROTHROMBIN TIME: 24.5 SECONDS (ref 11.6–14.8)

## 2023-01-19 PROCEDURE — 99214 OFFICE O/P EST MOD 30 MIN: CPT | Performed by: INTERNAL MEDICINE

## 2023-01-19 RX ORDER — GABAPENTIN 100 MG/1
300 CAPSULE ORAL 3 TIMES DAILY
COMMUNITY

## 2023-01-19 NOTE — PROGRESS NOTES
Education Record    Learner:  Patient    Disease / Diagnosis: Here for f/u. On Coumadin. Barriers / Limitations:  None   Comments:    Method:  Discussion   Comments:    General Topics:  Plan of care reviewed   Comments:    Outcome:  Shows understanding   Comments:    No abnormal bleeding/bruising. On 5mg all other days besides 10mg tues/thurs. No sob. Her normal fatigue is present.

## 2023-01-20 ENCOUNTER — PATIENT MESSAGE (OUTPATIENT)
Dept: HEMATOLOGY/ONCOLOGY | Facility: HOSPITAL | Age: 53
End: 2023-01-20

## 2023-01-20 ENCOUNTER — ANTI-COAG VISIT (OUTPATIENT)
Dept: HEMATOLOGY/ONCOLOGY | Facility: HOSPITAL | Age: 53
End: 2023-01-20

## 2023-01-20 DIAGNOSIS — D68.61 ANTIPHOSPHOLIPID ANTIBODY SYNDROME (HCC): ICD-10-CM

## 2023-01-27 ENCOUNTER — ANTI-COAG VISIT (OUTPATIENT)
Dept: HEMATOLOGY/ONCOLOGY | Facility: HOSPITAL | Age: 53
End: 2023-01-27

## 2023-01-27 DIAGNOSIS — D68.61 ANTIPHOSPHOLIPID ANTIBODY SYNDROME (HCC): ICD-10-CM

## 2023-01-27 LAB — INR: 2.4 (ref 0.8–1.2)

## 2023-02-03 LAB — INR: 2.6 (ref 0.8–1.2)

## 2023-02-08 ENCOUNTER — ANTI-COAG VISIT (OUTPATIENT)
Dept: HEMATOLOGY/ONCOLOGY | Facility: HOSPITAL | Age: 53
End: 2023-02-08

## 2023-02-08 DIAGNOSIS — D68.61 ANTIPHOSPHOLIPID ANTIBODY SYNDROME (HCC): ICD-10-CM

## 2023-02-10 ENCOUNTER — ANTI-COAG VISIT (OUTPATIENT)
Dept: HEMATOLOGY/ONCOLOGY | Facility: HOSPITAL | Age: 53
End: 2023-02-10

## 2023-02-10 DIAGNOSIS — D68.61 ANTIPHOSPHOLIPID ANTIBODY SYNDROME (HCC): ICD-10-CM

## 2023-02-10 LAB — INR: 2.9 (ref 0.8–1.2)

## 2023-02-24 ENCOUNTER — ANTI-COAG VISIT (OUTPATIENT)
Dept: HEMATOLOGY/ONCOLOGY | Facility: HOSPITAL | Age: 53
End: 2023-02-24

## 2023-02-24 DIAGNOSIS — D68.61 ANTIPHOSPHOLIPID ANTIBODY SYNDROME (HCC): ICD-10-CM

## 2023-02-24 LAB — INR: 2.3 (ref 0.8–1.2)

## 2023-03-03 ENCOUNTER — HOSPITAL ENCOUNTER (OUTPATIENT)
Dept: CT IMAGING | Facility: HOSPITAL | Age: 53
Discharge: HOME | End: 2023-03-03
Attending: STUDENT IN AN ORGANIZED HEALTH CARE EDUCATION/TRAINING PROGRAM
Payer: COMMERCIAL

## 2023-03-03 ENCOUNTER — HOSPITAL ENCOUNTER (OUTPATIENT)
Dept: MRI IMAGING | Facility: HOSPITAL | Age: 53
Discharge: HOME | End: 2023-03-03
Attending: STUDENT IN AN ORGANIZED HEALTH CARE EDUCATION/TRAINING PROGRAM
Payer: COMMERCIAL

## 2023-03-03 ENCOUNTER — HOSPITAL ENCOUNTER (OUTPATIENT)
Dept: NUCLEAR MEDICINE | Facility: HOSPITAL | Age: 53
Discharge: HOME | End: 2023-03-03
Attending: STUDENT IN AN ORGANIZED HEALTH CARE EDUCATION/TRAINING PROGRAM
Payer: COMMERCIAL

## 2023-03-03 DIAGNOSIS — Z96.651 PRESENCE OF ARTIFICIAL KNEE JOINT, RIGHT: ICD-10-CM

## 2023-03-03 DIAGNOSIS — M17.12 UNILATERAL PRIMARY OSTEOARTHRITIS, LEFT KNEE: ICD-10-CM

## 2023-03-03 DIAGNOSIS — M25.561 RIGHT KNEE PAIN: ICD-10-CM

## 2023-03-03 PROCEDURE — 73700 CT LOWER EXTREMITY W/O DYE: CPT | Performed by: STUDENT IN AN ORGANIZED HEALTH CARE EDUCATION/TRAINING PROGRAM

## 2023-03-03 PROCEDURE — 78315 BONE IMAGING 3 PHASE: CPT | Performed by: STUDENT IN AN ORGANIZED HEALTH CARE EDUCATION/TRAINING PROGRAM

## 2023-03-03 PROCEDURE — 73721 MRI JNT OF LWR EXTRE W/O DYE: CPT | Performed by: STUDENT IN AN ORGANIZED HEALTH CARE EDUCATION/TRAINING PROGRAM

## 2023-03-06 ENCOUNTER — ANTI-COAG VISIT (OUTPATIENT)
Dept: HEMATOLOGY/ONCOLOGY | Facility: HOSPITAL | Age: 53
End: 2023-03-06

## 2023-03-06 DIAGNOSIS — D68.61 ANTIPHOSPHOLIPID ANTIBODY SYNDROME (HCC): ICD-10-CM

## 2023-03-06 LAB — INR: 2.4 (ref 0.8–1.2)

## 2023-03-21 DIAGNOSIS — D68.61 ANTIPHOSPHOLIPID ANTIBODY SYNDROME (HCC): ICD-10-CM

## 2023-03-22 RX ORDER — WARFARIN SODIUM 10 MG/1
TABLET ORAL
Qty: 30 TABLET | Refills: 0 | Status: SHIPPED | OUTPATIENT
Start: 2023-03-22

## 2023-03-29 ENCOUNTER — ANTI-COAG VISIT (OUTPATIENT)
Dept: HEMATOLOGY/ONCOLOGY | Facility: HOSPITAL | Age: 53
End: 2023-03-29

## 2023-03-29 DIAGNOSIS — D68.61 ANTIPHOSPHOLIPID ANTIBODY SYNDROME (HCC): ICD-10-CM

## 2023-03-29 LAB — INR: 2.3 (ref 0.8–1.2)

## 2023-04-17 ENCOUNTER — ANTI-COAG VISIT (OUTPATIENT)
Dept: HEMATOLOGY/ONCOLOGY | Facility: HOSPITAL | Age: 53
End: 2023-04-17

## 2023-04-17 DIAGNOSIS — D68.61 ANTIPHOSPHOLIPID ANTIBODY SYNDROME (HCC): ICD-10-CM

## 2023-04-17 LAB — INR: 2.5 (ref 0.8–1.2)

## 2023-05-02 ENCOUNTER — ANTI-COAG VISIT (OUTPATIENT)
Dept: HEMATOLOGY/ONCOLOGY | Facility: HOSPITAL | Age: 53
End: 2023-05-02

## 2023-05-02 DIAGNOSIS — M32.19 OTHER SYSTEMIC LUPUS ERYTHEMATOSUS WITH OTHER ORGAN INVOLVEMENT (HCC): ICD-10-CM

## 2023-05-02 DIAGNOSIS — D68.61 ANTIPHOSPHOLIPID ANTIBODY SYNDROME (HCC): ICD-10-CM

## 2023-05-02 DIAGNOSIS — D68.61 ANTIPHOSPHOLIPID ANTIBODY SYNDROME (HCC): Primary | ICD-10-CM

## 2023-05-02 LAB — INR: 2.6 (ref 0.8–1.2)

## 2023-05-02 RX ORDER — ENOXAPARIN SODIUM 100 MG/ML
1 INJECTION SUBCUTANEOUS 2 TIMES DAILY
Qty: 18.4 ML | Refills: 0 | Status: SHIPPED | OUTPATIENT
Start: 2023-05-02

## 2023-05-09 ENCOUNTER — PATIENT MESSAGE (OUTPATIENT)
Dept: HEMATOLOGY/ONCOLOGY | Facility: HOSPITAL | Age: 53
End: 2023-05-09

## 2023-05-09 ENCOUNTER — TELEPHONE (OUTPATIENT)
Dept: HEMATOLOGY/ONCOLOGY | Facility: HOSPITAL | Age: 53
End: 2023-05-09

## 2023-05-09 NOTE — TELEPHONE ENCOUNTER
Denita calling she's having total knee replacement 5/22. Dr Shiela Frazier is asking if there is any additional testing that needs to be done prior to surgery. She will need something in righting from Dr Gregory Garcia. Dr Eugene Gama Mort #[phone]. Pt will see dr Ortencia Nageotte tomorrow.  Thank you Jonny Smith

## 2023-05-12 ENCOUNTER — TELEPHONE (OUTPATIENT)
Dept: HEMATOLOGY/ONCOLOGY | Facility: HOSPITAL | Age: 53
End: 2023-05-12

## 2023-05-15 ENCOUNTER — ANTI-COAG VISIT (OUTPATIENT)
Dept: HEMATOLOGY/ONCOLOGY | Facility: HOSPITAL | Age: 53
End: 2023-05-15

## 2023-05-15 DIAGNOSIS — D68.61 ANTIPHOSPHOLIPID ANTIBODY SYNDROME (HCC): ICD-10-CM

## 2023-05-15 LAB — INR: 3.1 (ref 0.8–1.2)

## 2023-05-31 ENCOUNTER — TELEPHONE (OUTPATIENT)
Dept: HEMATOLOGY/ONCOLOGY | Facility: HOSPITAL | Age: 53
End: 2023-05-31

## 2023-05-31 NOTE — TELEPHONE ENCOUNTER
Left message for patient to return call regarding INR dosing. Would like to verify Warfarin dose and follow up.

## 2023-06-01 ENCOUNTER — ANTI-COAG VISIT (OUTPATIENT)
Dept: HEMATOLOGY/ONCOLOGY | Facility: HOSPITAL | Age: 53
End: 2023-06-01

## 2023-06-01 DIAGNOSIS — D68.61 ANTIPHOSPHOLIPID ANTIBODY SYNDROME (HCC): ICD-10-CM

## 2023-06-01 LAB
INR: 3 (ref 0.8–1.2)
INR: 3.9 (ref 0.8–1.2)

## 2023-06-13 ENCOUNTER — ANTI-COAG VISIT (OUTPATIENT)
Dept: HEMATOLOGY/ONCOLOGY | Facility: HOSPITAL | Age: 53
End: 2023-06-13

## 2023-06-13 DIAGNOSIS — D68.61 ANTIPHOSPHOLIPID ANTIBODY SYNDROME (HCC): ICD-10-CM

## 2023-06-13 LAB — INR: 3.8 (ref 0.8–1.2)

## 2023-06-21 ENCOUNTER — TELEPHONE (OUTPATIENT)
Dept: HEMATOLOGY/ONCOLOGY | Facility: HOSPITAL | Age: 53
End: 2023-06-21

## 2023-06-21 ENCOUNTER — ANTI-COAG VISIT (OUTPATIENT)
Dept: HEMATOLOGY/ONCOLOGY | Facility: HOSPITAL | Age: 53
End: 2023-06-21

## 2023-06-21 DIAGNOSIS — D68.61 ANTIPHOSPHOLIPID ANTIBODY SYNDROME (HCC): Primary | ICD-10-CM

## 2023-06-21 DIAGNOSIS — D68.61 ANTIPHOSPHOLIPID ANTIBODY SYNDROME (HCC): ICD-10-CM

## 2023-06-21 LAB — INR: 5.5 (ref 0.8–1.2)

## 2023-06-21 NOTE — TELEPHONE ENCOUNTER
Spoke with patient. Instructed her to hold coumadin tonight and come in to clinic tomorrow AM for peripheral INR check per MD. Patient verbalized understanding. Appointment scheduled with patient.

## 2023-06-22 ENCOUNTER — NURSE ONLY (OUTPATIENT)
Dept: HEMATOLOGY/ONCOLOGY | Age: 53
End: 2023-06-22
Attending: INTERNAL MEDICINE
Payer: COMMERCIAL

## 2023-06-22 ENCOUNTER — ANTI-COAG VISIT (OUTPATIENT)
Dept: HEMATOLOGY/ONCOLOGY | Facility: HOSPITAL | Age: 53
End: 2023-06-22

## 2023-06-22 DIAGNOSIS — D68.61 ANTIPHOSPHOLIPID ANTIBODY SYNDROME (HCC): ICD-10-CM

## 2023-06-22 LAB
INR BLD: 3.27 (ref 0.85–1.16)
PROTHROMBIN TIME: 32.8 SECONDS (ref 11.6–14.8)

## 2023-06-22 PROCEDURE — 85610 PROTHROMBIN TIME: CPT

## 2023-06-22 PROCEDURE — 36415 COLL VENOUS BLD VENIPUNCTURE: CPT

## 2023-06-30 ENCOUNTER — APPOINTMENT (OUTPATIENT)
Dept: CT IMAGING | Facility: HOSPITAL | Age: 53
End: 2023-06-30
Attending: EMERGENCY MEDICINE
Payer: COMMERCIAL

## 2023-06-30 ENCOUNTER — HOSPITAL ENCOUNTER (OUTPATIENT)
Facility: HOSPITAL | Age: 53
Setting detail: OBSERVATION
Discharge: HOME | End: 2023-07-04
Attending: EMERGENCY MEDICINE | Admitting: INTERNAL MEDICINE
Payer: COMMERCIAL

## 2023-06-30 ENCOUNTER — TELEPHONE (OUTPATIENT)
Dept: HEMATOLOGY/ONCOLOGY | Facility: HOSPITAL | Age: 53
End: 2023-06-30

## 2023-06-30 DIAGNOSIS — K62.5 RECTAL BLEEDING: Primary | ICD-10-CM

## 2023-06-30 PROBLEM — D68.59 HYPERCOAGULABLE STATE (HCC): Status: ACTIVE | Noted: 2023-06-30

## 2023-06-30 PROBLEM — G40.909 SEIZURE DISORDER (HCC): Status: ACTIVE | Noted: 2023-06-30

## 2023-06-30 PROBLEM — R73.9 HYPERGLYCEMIA: Status: ACTIVE | Noted: 2023-06-30

## 2023-06-30 PROBLEM — R79.89 AZOTEMIA: Status: ACTIVE | Noted: 2023-06-30

## 2023-06-30 LAB
ALBUMIN SERPL-MCNC: 3.5 G/DL (ref 3.4–5)
ALBUMIN/GLOB SERPL: 1.1 {RATIO} (ref 1–2)
ALP LIVER SERPL-CCNC: 92 U/L
ALT SERPL-CCNC: 19 U/L
ANION GAP SERPL CALC-SCNC: 3 MMOL/L (ref 0–18)
AST SERPL-CCNC: 24 U/L (ref 15–37)
BASOPHILS # BLD AUTO: 0.04 X10(3) UL (ref 0–0.2)
BASOPHILS NFR BLD AUTO: 0.3 %
BILIRUB SERPL-MCNC: 0.5 MG/DL (ref 0.1–2)
BUN BLD-MCNC: 21 MG/DL (ref 7–18)
CALCIUM BLD-MCNC: 8.7 MG/DL (ref 8.5–10.1)
CHLORIDE SERPL-SCNC: 108 MMOL/L (ref 98–112)
CO2 SERPL-SCNC: 27 MMOL/L (ref 21–32)
CREAT BLD-MCNC: 0.86 MG/DL
EOSINOPHIL # BLD AUTO: 0.04 X10(3) UL (ref 0–0.7)
EOSINOPHIL NFR BLD AUTO: 0.3 %
ERYTHROCYTE [DISTWIDTH] IN BLOOD BY AUTOMATED COUNT: 13.5 %
GFR SERPLBLD BASED ON 1.73 SQ M-ARVRAT: 82 ML/MIN/1.73M2 (ref 60–?)
GLOBULIN PLAS-MCNC: 3.1 G/DL (ref 2.8–4.4)
GLUCOSE BLD-MCNC: 123 MG/DL (ref 70–99)
HCT VFR BLD AUTO: 43.9 %
HGB BLD-MCNC: 13.7 G/DL
IMM GRANULOCYTES # BLD AUTO: 0.09 X10(3) UL (ref 0–1)
IMM GRANULOCYTES NFR BLD: 0.7 %
INR BLD: 2.15 (ref 0.85–1.16)
LYMPHOCYTES # BLD AUTO: 0.95 X10(3) UL (ref 1–4)
LYMPHOCYTES NFR BLD AUTO: 7.1 %
MCH RBC QN AUTO: 26.8 PG (ref 26–34)
MCHC RBC AUTO-ENTMCNC: 31.2 G/DL (ref 31–37)
MCV RBC AUTO: 85.7 FL
MONOCYTES # BLD AUTO: 0.3 X10(3) UL (ref 0.1–1)
MONOCYTES NFR BLD AUTO: 2.2 %
NEUTROPHILS # BLD AUTO: 12.03 X10 (3) UL (ref 1.5–7.7)
NEUTROPHILS # BLD AUTO: 12.03 X10(3) UL (ref 1.5–7.7)
NEUTROPHILS NFR BLD AUTO: 89.4 %
OSMOLALITY SERPL CALC.SUM OF ELEC: 290 MOSM/KG (ref 275–295)
PLATELET # BLD AUTO: 313 10(3)UL (ref 150–450)
POTASSIUM SERPL-SCNC: 4.2 MMOL/L (ref 3.5–5.1)
PROT SERPL-MCNC: 6.6 G/DL (ref 6.4–8.2)
PROTHROMBIN TIME: 23.8 SECONDS (ref 11.6–14.8)
RBC # BLD AUTO: 5.12 X10(6)UL
SODIUM SERPL-SCNC: 138 MMOL/L (ref 136–145)
WBC # BLD AUTO: 13.5 X10(3) UL (ref 4–11)

## 2023-06-30 PROCEDURE — 74177 CT ABD & PELVIS W/CONTRAST: CPT | Performed by: EMERGENCY MEDICINE

## 2023-06-30 PROCEDURE — 99223 1ST HOSP IP/OBS HIGH 75: CPT | Performed by: HOSPITALIST

## 2023-06-30 RX ORDER — LEVETIRACETAM 500 MG/1
500 TABLET ORAL 2 TIMES DAILY
Status: DISCONTINUED | OUTPATIENT
Start: 2023-06-30 | End: 2023-07-04

## 2023-06-30 RX ORDER — MONTELUKAST SODIUM 10 MG/1
10 TABLET ORAL DAILY
Status: DISCONTINUED | OUTPATIENT
Start: 2023-07-01 | End: 2023-07-04

## 2023-06-30 RX ORDER — TOPIRAMATE 25 MG/1
100 TABLET ORAL 2 TIMES DAILY
Status: DISCONTINUED | OUTPATIENT
Start: 2023-06-30 | End: 2023-07-04

## 2023-06-30 RX ORDER — ACETAMINOPHEN 500 MG
500 TABLET ORAL EVERY 4 HOURS PRN
Status: DISCONTINUED | OUTPATIENT
Start: 2023-06-30 | End: 2023-07-04

## 2023-06-30 RX ORDER — LEVETIRACETAM 500 MG/1
500 TABLET ORAL 2 TIMES DAILY
COMMUNITY

## 2023-06-30 RX ORDER — FLUTICASONE FUROATE AND VILANTEROL 100; 25 UG/1; UG/1
1 POWDER RESPIRATORY (INHALATION) DAILY
Status: DISCONTINUED | OUTPATIENT
Start: 2023-07-01 | End: 2023-07-04

## 2023-06-30 RX ORDER — PROCHLORPERAZINE EDISYLATE 5 MG/ML
5 INJECTION INTRAMUSCULAR; INTRAVENOUS EVERY 8 HOURS PRN
Status: DISCONTINUED | OUTPATIENT
Start: 2023-06-30 | End: 2023-07-04

## 2023-06-30 RX ORDER — HYDROCODONE BITARTRATE AND ACETAMINOPHEN 10; 325 MG/1; MG/1
1 TABLET ORAL EVERY 6 HOURS PRN
Status: DISCONTINUED | OUTPATIENT
Start: 2023-06-30 | End: 2023-07-04

## 2023-06-30 RX ORDER — SODIUM CHLORIDE 9 MG/ML
INJECTION, SOLUTION INTRAVENOUS CONTINUOUS
Status: DISCONTINUED | OUTPATIENT
Start: 2023-06-30 | End: 2023-07-04

## 2023-06-30 RX ORDER — OXYCODONE HYDROCHLORIDE 10 MG/1
10 TABLET ORAL EVERY 4 HOURS PRN
COMMUNITY

## 2023-06-30 RX ORDER — MELATONIN
3 NIGHTLY PRN
Status: DISCONTINUED | OUTPATIENT
Start: 2023-06-30 | End: 2023-07-04

## 2023-06-30 RX ORDER — FOLIC ACID 1 MG/1
1 TABLET ORAL DAILY
Status: DISCONTINUED | OUTPATIENT
Start: 2023-06-30 | End: 2023-07-04

## 2023-06-30 RX ORDER — HYDROCORTISONE 10 MG/1
10 TABLET ORAL DAILY
Status: CANCELLED | OUTPATIENT
Start: 2023-06-30

## 2023-06-30 RX ORDER — ATORVASTATIN CALCIUM 10 MG/1
10 TABLET, FILM COATED ORAL NIGHTLY
Status: DISCONTINUED | OUTPATIENT
Start: 2023-06-30 | End: 2023-07-04

## 2023-06-30 RX ORDER — LEVOTHYROXINE SODIUM 0.07 MG/1
75 TABLET ORAL
Status: DISCONTINUED | OUTPATIENT
Start: 2023-07-01 | End: 2023-07-04

## 2023-06-30 RX ORDER — ALPRAZOLAM 0.25 MG/1
0.25 TABLET ORAL NIGHTLY PRN
Status: DISCONTINUED | OUTPATIENT
Start: 2023-06-30 | End: 2023-07-04

## 2023-06-30 RX ORDER — GABAPENTIN 300 MG/1
300 CAPSULE ORAL 3 TIMES DAILY
Status: DISCONTINUED | OUTPATIENT
Start: 2023-06-30 | End: 2023-07-04

## 2023-06-30 RX ORDER — ONDANSETRON 2 MG/ML
4 INJECTION INTRAMUSCULAR; INTRAVENOUS EVERY 6 HOURS PRN
Status: DISCONTINUED | OUTPATIENT
Start: 2023-06-30 | End: 2023-07-04

## 2023-06-30 RX ORDER — HYDROXYCHLOROQUINE SULFATE 200 MG/1
200 TABLET, FILM COATED ORAL 2 TIMES DAILY
Status: DISCONTINUED | OUTPATIENT
Start: 2023-06-30 | End: 2023-07-04

## 2023-06-30 RX ORDER — PREDNISONE 10 MG/1
10 TABLET ORAL DAILY
Status: DISCONTINUED | OUTPATIENT
Start: 2023-06-30 | End: 2023-07-04

## 2023-06-30 RX ORDER — WARFARIN SODIUM 10 MG/1
10 TABLET ORAL AS DIRECTED
COMMUNITY

## 2023-06-30 NOTE — TELEPHONE ENCOUNTER
I called Denita and updated her that Dr David Christie wants her to be evaluated at the [facility]. She agreed. ETA 30 minutes. I called report to the [facility] Triage RN. She also asked me to let Dr David Christie know that she is taking Keppra. She has an appointment with her PCP for Monday.  She will ask him for the name of a neurologist. She also said she would like Dr Jim Gonzalez recommendation for a neurologist.

## 2023-06-30 NOTE — TELEPHONE ENCOUNTER
Patient called to report INR 2.6 and was released from hospital Monday and was to hold of warfin pills until she talk to doctor or nurse. Patient is bleeding  when having a bowl movement says its a little . please advise.

## 2023-06-30 NOTE — ED INITIAL ASSESSMENT (HPI)
Patient here for eval of bleeding from rectum. Patient states she had two episodes of bleeding, one yesterday and one today. +thinners. Patient states she has not been constipated.

## 2023-06-30 NOTE — TELEPHONE ENCOUNTER
Dr Dheeraj Gillespie patient - antiphopholipid antibody syndrome on warfarin. Patient reports that she had knee surgery. She was taking the antibiotic the surgeon ordered. Last weekend she developed hives. She had two seizures and was hospitalized at [facility]. They gave her a lovenox injection on Tuesday and discharged her. They told her to hold her warfarin for two days and call Dr Dheeraj Gillespie. Today her INR=2.6. Patient reports she had a soft bowel movement yesterday and had blood in her stool and the toilet water. Today she had another bloody bowel movement. It again was in the stool and the toilet water. I told Denita I would update Dr Dheeraj Gillespie and his nurse now. We will call her back with further instructions.

## 2023-07-01 LAB
ERYTHROCYTE [DISTWIDTH] IN BLOOD BY AUTOMATED COUNT: 13.4 %
HCT VFR BLD AUTO: 39.4 %
HGB BLD-MCNC: 11.9 G/DL
INR BLD: 1.99 (ref 0.85–1.16)
MCH RBC QN AUTO: 27 PG (ref 26–34)
MCHC RBC AUTO-ENTMCNC: 30.2 G/DL (ref 31–37)
MCV RBC AUTO: 89.3 FL
PLATELET # BLD AUTO: 254 10(3)UL (ref 150–450)
PROTHROMBIN TIME: 22.4 SECONDS (ref 11.6–14.8)
RBC # BLD AUTO: 4.41 X10(6)UL
WBC # BLD AUTO: 7.8 X10(3) UL (ref 4–11)

## 2023-07-01 PROCEDURE — 99232 SBSQ HOSP IP/OBS MODERATE 35: CPT | Performed by: INTERNAL MEDICINE

## 2023-07-01 NOTE — ED QUICK NOTES
Orders for admission, patient is aware of plan and ready to go upstairs. Any questions, please call ED RN Moiz Ramirez at extension [phone].      Patient Covid vaccination status: Fully vaccinated     COVID Test Ordered in ED: None    COVID Suspicion at Admission: N/A    Running Infusions:  None    Mental Status/LOC at time of transport: AO x 4    Other pertinent information:   CIWA score: N/A   NIH score:  N/A

## 2023-07-02 LAB
ANION GAP SERPL CALC-SCNC: 5 MMOL/L (ref 0–18)
BASOPHILS # BLD AUTO: 0.02 X10(3) UL (ref 0–0.2)
BASOPHILS NFR BLD AUTO: 0.3 %
BUN BLD-MCNC: 16 MG/DL (ref 7–18)
CALCIUM BLD-MCNC: 8.1 MG/DL (ref 8.5–10.1)
CHLORIDE SERPL-SCNC: 118 MMOL/L (ref 98–112)
CO2 SERPL-SCNC: 22 MMOL/L (ref 21–32)
CREAT BLD-MCNC: 0.82 MG/DL
EOSINOPHIL # BLD AUTO: 0.03 X10(3) UL (ref 0–0.7)
EOSINOPHIL NFR BLD AUTO: 0.5 %
ERYTHROCYTE [DISTWIDTH] IN BLOOD BY AUTOMATED COUNT: 13.8 %
GFR SERPLBLD BASED ON 1.73 SQ M-ARVRAT: 87 ML/MIN/1.73M2 (ref 60–?)
GLUCOSE BLD-MCNC: 109 MG/DL (ref 70–99)
HCT VFR BLD AUTO: 35.7 %
HGB BLD-MCNC: 10.5 G/DL
HGB BLD-MCNC: 11.9 G/DL
IMM GRANULOCYTES # BLD AUTO: 0.03 X10(3) UL (ref 0–1)
IMM GRANULOCYTES NFR BLD: 0.5 %
INR BLD: 2.06 (ref 0.85–1.16)
LYMPHOCYTES # BLD AUTO: 2.14 X10(3) UL (ref 1–4)
LYMPHOCYTES NFR BLD AUTO: 34 %
MCH RBC QN AUTO: 26.6 PG (ref 26–34)
MCHC RBC AUTO-ENTMCNC: 29.4 G/DL (ref 31–37)
MCV RBC AUTO: 90.6 FL
MONOCYTES # BLD AUTO: 0.43 X10(3) UL (ref 0.1–1)
MONOCYTES NFR BLD AUTO: 6.8 %
NEUTROPHILS # BLD AUTO: 3.64 X10 (3) UL (ref 1.5–7.7)
NEUTROPHILS # BLD AUTO: 3.64 X10(3) UL (ref 1.5–7.7)
NEUTROPHILS NFR BLD AUTO: 57.9 %
OSMOLALITY SERPL CALC.SUM OF ELEC: 302 MOSM/KG (ref 275–295)
PLATELET # BLD AUTO: 218 10(3)UL (ref 150–450)
POTASSIUM SERPL-SCNC: 3.6 MMOL/L (ref 3.5–5.1)
PROTHROMBIN TIME: 23 SECONDS (ref 11.6–14.8)
RBC # BLD AUTO: 3.94 X10(6)UL
SODIUM SERPL-SCNC: 145 MMOL/L (ref 136–145)
WBC # BLD AUTO: 6.3 X10(3) UL (ref 4–11)

## 2023-07-02 PROCEDURE — 99232 SBSQ HOSP IP/OBS MODERATE 35: CPT | Performed by: INTERNAL MEDICINE

## 2023-07-02 NOTE — PLAN OF CARE
A&Ox4. VSS on room air. Up ad charan with walker. PRN norco given for knee pain. Tolerating regular diet, denies nausea. Voiding adequately. Passing gas, some abdominal tenderness. IVF. Problem: Patient/Family Goals  Goal: Patient/Family Long Term Goal  Description: Patient's Long Term Goal: Discharge home    Interventions:  - Monitor labs  -GI consult  - See additional Care Plan goals for specific interventions  Outcome: Progressing  Note: Discharge home  Goal: Patient/Family Short Term Goal  Description: Patient's Short Term Goal: Monitor for bleeding, pain control, tolerate diet    Interventions:   - Monitor labs/monitor stools  -PRN pain medication  -Tolerate regular diet  - See additional Care Plan goals for specific interventions  Outcome: Progressing  Note: Monitor for bleeding, pain control, tolerate diet     Problem: RISK FOR INFECTION - ADULT  Goal: Absence of fever/infection during anticipated neutropenic period  Description: INTERVENTIONS  - Monitor WBC  - Administer growth factors as ordered  - Implement neutropenic guidelines  Outcome: Progressing     Problem: SAFETY ADULT - FALL  Goal: Free from fall injury  Description: INTERVENTIONS:  - Assess pt frequently for physical needs  - Identify cognitive and physical deficits and behaviors that affect risk of falls.   - [place] fall precautions as indicated by assessment.  - Educate pt/family on patient safety including physical limitations  - Instruct pt to call for assistance with activity based on assessment  - Modify environment to reduce risk of injury  - Provide assistive devices as appropriate  - Consider OT/PT consult to assist with strengthening/mobility  - Encourage toileting schedule  Outcome: Progressing     Problem: DISCHARGE PLANNING  Goal: Discharge to home or other facility with appropriate resources  Description: INTERVENTIONS:  - Identify barriers to discharge w/pt and caregiver  - Include patient/family/discharge partner in discharge planning  - Arrange for needed discharge resources and transportation as appropriate  - Identify discharge learning needs (meds, wound care, etc)  - Arrange for interpreters to assist at discharge as needed  - Consider post-discharge preferences of patient/family/discharge partner  - Complete POLST form as appropriate  - Assess patient's ability to be responsible for managing their own health  - Refer to Case Management Department for coordinating discharge planning if the patient needs post-hospital services based on physician/LIP order or complex needs related to functional status, cognitive ability or social support system  Outcome: Progressing

## 2023-07-02 NOTE — PHYSICAL THERAPY NOTE
PHYSICAL THERAPY EVALUATION - INPATIENT     Room Number: 560/466-L  Evaluation Date: 7/2/2023  Type of Evaluation: Initial  Physician Order: PT Eval and Treat    Presenting Problem: rectal bleeding  Co-Morbidities : diverticulitis, migraines, HTN, DVT, Lupus, RA, migraines, fibromyalgia, h/o PE, seizure disorder, and L TKA 5/23  Reason for Therapy: Mobility Dysfunction and Discharge Planning      ASSESSMENT   PT orders received, chart reviewed, and RN approved PT session. Pt is a 47year old female admitted on 6/30/2023 for rectal bleeding. Functional outcome measures completed include [facility]. The AM-PAC '6-Clicks' Inpatient Basic Mobility Short Form was completed and this patient is demonstrating a Approx Degree of Impairment: 0%  degree of impairment in mobility. Research supports that patients with this level of impairment may benefit from DC recommendation to home with outpatient PT for L knee s/p L TKA in May 2023. Pt requesting exercises for the L knee, as pt has been sick on and off since surgery and she doesn't want regression. PT Discharge Recommendations: Home;Outpatient PT      PLAN  Patient has been evaluated and presents with no skilled Physical Therapy needs at this time. Patient discharged from Physical Therapy services. Please re-order if a new functional limitation presents during this admission. GOALS  Patient was able to achieve the following goals . .. Patient was able to transfer At previous, functional level  Safely and Mod I   Patient able to ambulate on level surfaces At previous, functional level  Safely and Mod I         HOME SITUATION  Type of Home: House   Home Layout: One level  Stairs to Enter : 3  Railing: Yes          Lives With: Spouse;Daughter  Drives: Yes  Patient Owned Equipment: Rolling walker  Patient Regularly Uses: Glasses    Prior Level of [place]: Pt lives in a ranch home with 3 REBECCA. Pt  lives with her spouse and daughter.  Pt is independent with ADL and ambulates with RW. Pt drives and is not currently working. SUBJECTIVE  Pt is pleasant and cooperative. OBJECTIVE  Precautions: None  Fall Risk: Standard fall risk    WEIGHT BEARING RESTRICTION                   PAIN ASSESSMENT  Rating: Unable to rate  Location: L knee and abdomen  Management Techniques: Relaxation;Repositioning; Activity promotion    COGNITION  Overall Cognitive Status:  WFL - within functional limits    RANGE OF MOTION AND STRENGTH ASSESSMENT  Upper extremity ROM and strength are within functional limits     Lower extremity ROM is within functional limits  Limited L knee ROM due to surgery, R LE WNL    Lower extremity strength is within functional limits  R LE 5/5, L knee extension 4/5      BALANCE  Static Sitting: Good  Dynamic Sitting: Good  Static Standing: Poor +  Dynamic Standing: Poor +    ADDITIONAL TESTS                                    ACTIVITY TOLERANCE  Pulse: 71  Heart Rate Source: Monitor                   O2 WALK  Oxygen Therapy  SPO2% on Room Air at Rest: 100    NEUROLOGICAL FINDINGS                        AM-PAC '6-Clicks' INPATIENT SHORT FORM - BASIC MOBILITY  How much difficulty does the patient currently have. .. Patient Difficulty: Turning over in bed (including adjusting bedclothes, sheets and blankets)?: None   Patient Difficulty: Sitting down on and standing up from a chair with arms (e.g., wheelchair, bedside commode, etc.): None   Patient Difficulty: Moving from lying on back to sitting on the side of the bed?: None   How much help from another person does the patient currently need. ..    Help from Another: Moving to and from a bed to a chair (including a wheelchair)?: None   Help from Another: Need to walk in hospital room?: None   Help from Another: Climbing 3-5 steps with a railing?: None       AM-PAC Score:  Raw Score: 24   Approx Degree of Impairment: 0%   Standardized Score (AM-PAC Scale): 61.14   CMS Modifier (G-Code): CH    FUNCTIONAL ABILITY STATUS  Gait Assessment   Functional Mobility/Gait Assessment  Gait Assistance: Modified independent  Distance (ft): 300  Assistive Device: Rolling walker  Pattern: L Decreased stance time    Skilled Therapy Provided     Bed Mobility:  Rolling: NT  Supine to sit: Mod I   Sit to supine: Mod I     Transfer Mobility:  Sit to stand: Mod I   Stand to sit: Mod I  Gait = Mod I    Therapist's comments:pt lying in bed and agreed to PT. Pt given TKA exercise program and instructed in the exercises. Pt demonstrates good technique with exercises. Recommend use of ice after performing exercises. Adjustment made to the RW to fit pt appropriately (previous height was too high for pt.) instructed pt in heel toe gait, heel strike at stance phase, cued for upper body relaxation in standing and ambulation. Advised pt to perform standing exercises only with RN staff present, advised to ambulate with RN staff 3-4 times per day and sit up in chair for all meals. Pt in understanding. Vitals assessed and monitored throughout session and WNL. RN aware of session. Exercise/Education Provided: Body mechanics  Energy conservation  Gait training  Strengthening  Lower therapeutic exercise: Ankle pumps  Glut sets  Heel slides  Knee extension  Quad sets  SAQ  SLR  Standing toe raises, mini squats, and hamstring curl x 10 each    Patient End of Session: Up in chair;Needs met;Call light within reach;RN aware of session/findings; All patient questions and concerns addressed    Patient Evaluation Complexity Level:  History High - 3 or more personal factors and/or co-morbidities   Examination of body systems High - addressing a total of 4 or more elements   Clinical Presentation Low - Stable   Clinical Decision Making Low Complexity       PT Session Time: 40 minutes  Gait Training: 10 minutes  Therapeutic Activity: 0 minutes  Neuromuscular Re-education: 0 minutes  Therapeutic Exercise: 25 minutes

## 2023-07-03 ENCOUNTER — ANESTHESIA EVENT (OUTPATIENT)
Dept: ENDOSCOPY | Facility: HOSPITAL | Age: 53
End: 2023-07-03
Payer: COMMERCIAL

## 2023-07-03 PROBLEM — D64.9 ANEMIA: Status: ACTIVE | Noted: 2023-07-03

## 2023-07-03 LAB
DEPRECATED HBV CORE AB SER IA-ACNC: 67.5 NG/ML
ERYTHROCYTE [DISTWIDTH] IN BLOOD BY AUTOMATED COUNT: 13.9 %
HCT VFR BLD AUTO: 36.1 %
HGB BLD-MCNC: 11 G/DL
INR BLD: 1.53 (ref 0.85–1.16)
IRON SATN MFR SERPL: 17 %
IRON SERPL-MCNC: 41 UG/DL
MCH RBC QN AUTO: 26.6 PG (ref 26–34)
MCHC RBC AUTO-ENTMCNC: 30.5 G/DL (ref 31–37)
MCV RBC AUTO: 87.2 FL
PLATELET # BLD AUTO: 214 10(3)UL (ref 150–450)
PROTHROMBIN TIME: 18.3 SECONDS (ref 11.6–14.8)
RBC # BLD AUTO: 4.14 X10(6)UL
TIBC SERPL-MCNC: 235 UG/DL (ref 240–450)
TRANSFERRIN SERPL-MCNC: 158 MG/DL (ref 200–360)
WBC # BLD AUTO: 6.3 X10(3) UL (ref 4–11)

## 2023-07-03 PROCEDURE — 99223 1ST HOSP IP/OBS HIGH 75: CPT | Performed by: INTERNAL MEDICINE

## 2023-07-03 PROCEDURE — 99232 SBSQ HOSP IP/OBS MODERATE 35: CPT | Performed by: INTERNAL MEDICINE

## 2023-07-03 NOTE — PLAN OF CARE
A&Ox4. VSS. RA. . Denies chest pain and SOB. GI: Abdomen soft, nondistended, with tenderness. Passage of gas. Denies nausea. : Voids- Clear yellow with adequate output. Pain managed with PRN pain medications per MAR. Up ad charan with walker. Skin: Bruising BLE and old incision site to Lt knee, CDI. Diet: Regular/ general- tolerating well- Clear liquids at midnight. IVF running per order. All appropriate safety measures in place. All questions and concerns addressed.

## 2023-07-03 NOTE — PLAN OF CARE
Alert and oriented X 4. VSS. RA. Clear liquid diet tolerated. Denies pain and sob at this time. Abdomen is soft and tender. Nausea medication given per PRN medications. UP ad charan tolerating well. IVF. Updated on plan of care. Patient to be NPO at midnight. Call light in reach.

## 2023-07-04 ENCOUNTER — ANESTHESIA (OUTPATIENT)
Dept: ENDOSCOPY | Facility: HOSPITAL | Age: 53
End: 2023-07-04
Payer: COMMERCIAL

## 2023-07-04 VITALS
RESPIRATION RATE: 16 BRPM | BODY MASS INDEX: 38.74 KG/M2 | OXYGEN SATURATION: 100 % | TEMPERATURE: 98 F | DIASTOLIC BLOOD PRESSURE: 59 MMHG | WEIGHT: 210.5 LBS | HEART RATE: 66 BPM | SYSTOLIC BLOOD PRESSURE: 131 MMHG | HEIGHT: 62 IN

## 2023-07-04 LAB
INR BLD: 1.45 (ref 0.85–1.16)
PROTHROMBIN TIME: 17.5 SECONDS (ref 11.6–14.8)

## 2023-07-04 PROCEDURE — 0DB28ZX EXCISION OF MIDDLE ESOPHAGUS, VIA NATURAL OR ARTIFICIAL OPENING ENDOSCOPIC, DIAGNOSTIC: ICD-10-PCS | Performed by: INTERNAL MEDICINE

## 2023-07-04 PROCEDURE — 0DB98ZX EXCISION OF DUODENUM, VIA NATURAL OR ARTIFICIAL OPENING ENDOSCOPIC, DIAGNOSTIC: ICD-10-PCS | Performed by: INTERNAL MEDICINE

## 2023-07-04 PROCEDURE — 0DBK8ZZ EXCISION OF ASCENDING COLON, VIA NATURAL OR ARTIFICIAL OPENING ENDOSCOPIC: ICD-10-PCS | Performed by: INTERNAL MEDICINE

## 2023-07-04 PROCEDURE — 0DB68ZX EXCISION OF STOMACH, VIA NATURAL OR ARTIFICIAL OPENING ENDOSCOPIC, DIAGNOSTIC: ICD-10-PCS | Performed by: INTERNAL MEDICINE

## 2023-07-04 PROCEDURE — 99239 HOSP IP/OBS DSCHRG MGMT >30: CPT | Performed by: INTERNAL MEDICINE

## 2023-07-04 PROCEDURE — 0DBM8ZZ EXCISION OF DESCENDING COLON, VIA NATURAL OR ARTIFICIAL OPENING ENDOSCOPIC: ICD-10-PCS | Performed by: INTERNAL MEDICINE

## 2023-07-04 RX ORDER — SODIUM CHLORIDE, SODIUM LACTATE, POTASSIUM CHLORIDE, CALCIUM CHLORIDE 600; 310; 30; 20 MG/100ML; MG/100ML; MG/100ML; MG/100ML
INJECTION, SOLUTION INTRAVENOUS CONTINUOUS PRN
Status: DISCONTINUED | OUTPATIENT
Start: 2023-07-04 | End: 2023-07-04 | Stop reason: SURG

## 2023-07-04 RX ORDER — NALOXONE HYDROCHLORIDE 0.4 MG/ML
80 INJECTION, SOLUTION INTRAMUSCULAR; INTRAVENOUS; SUBCUTANEOUS AS NEEDED
Status: DISCONTINUED | OUTPATIENT
Start: 2023-07-04 | End: 2023-07-04 | Stop reason: HOSPADM

## 2023-07-04 RX ORDER — SODIUM CHLORIDE, SODIUM LACTATE, POTASSIUM CHLORIDE, CALCIUM CHLORIDE 600; 310; 30; 20 MG/100ML; MG/100ML; MG/100ML; MG/100ML
INJECTION, SOLUTION INTRAVENOUS CONTINUOUS
Status: DISCONTINUED | OUTPATIENT
Start: 2023-07-04 | End: 2023-07-04

## 2023-07-04 RX ADMIN — SODIUM CHLORIDE, SODIUM LACTATE, POTASSIUM CHLORIDE, CALCIUM CHLORIDE: 600; 310; 30; 20 INJECTION, SOLUTION INTRAVENOUS at 09:53:00

## 2023-07-04 NOTE — ANESTHESIA POSTPROCEDURE EVALUATION
[facility] Adelia Rangel Patient Status:  Observation   Age/Gender 47year old female MRN [id number]   Location [address] [facility] 28 Attending Nick Patel, [address] [facility] Day # 0 PCP Bushra Mccain DO       Anesthesia Post-op Note    COLONOSCOPY with cold snare polypectomy    Procedure Summary       Date: 07/04/23 Room / Location: 1404 [facility] ENDOSCOPY 03 / [facility] ENDOSCOPY    Anesthesia Start: 8491 Anesthesia Stop: 0975    Procedures:       COLONOSCOPY with cold snare polypectomy      ESOPHAGOGASTRODUODENOSCOPY (EGD) with biopsy Diagnosis: (EGD=hiatal hernia and duodenal diverticulum     COLON=polyps, diverticulosis)    Surgeons: Vinh Alvarez MD Anesthesiologist: Mariama Hall MD    Anesthesia Type: MAC ASA Status: 3            Anesthesia Type: MAC    Vitals Value Taken Time   BP 93/70 07/04/23 1045   Temp  07/04/23 1049   Pulse 62 07/04/23 1049   Resp 16 07/04/23 1049   SpO2 100 % 07/04/23 1049   Vitals shown include unvalidated device data. Patient Location: Endoscopy    Anesthesia Type: MAC    Airway Patency: patent    Postop Pain Control: adequate    Mental Status: preanesthetic baseline    Nausea/Vomiting: none    Cardiopulmonary/Hydration status: stable euvolemic    Complications: no apparent anesthesia related complications    Postop vital signs: stable    Dental Exam: Unchanged from Preop    Patient to be discharged from PACU when criteria met.

## 2023-07-04 NOTE — OPERATIVE REPORT
Operative [id number]    PREOPERATIVE DIAGNOSIS/INDICATION:  Hematochezia  Acute blood loss anemia  POSTOPERTATIVE DIAGNOSIS:  Colon polyps  Diverticulosis  PROCEDURE PERFORMED: COLONOSCOPY with polypectomy  INFORMED CONSENT:  Once a brief history and physical examination was performed, the risks, benefits and alternatives to the procedure were discussed with the patient and/or family and informed consent was obtained. The risks of sedation, perforation, missed lesions and need for surgery were all discussed. Patient expressed understanding of the risks and agreed to proceed. PROCEDURE DESCRIPTION:The patient was then brought to the endoscopy suite where her pulse, pulse oximetry and blood pressure were monitored. She was placed in the left lateral decubitus position and deep sedation was administered. Once adequate sedation was achieved, a rectal exam was performed which was normal. A lubricated tip of an Olympus video colonoscope was then inserted through the rectum and gently manipulated under direct visualization to the cecal pole and the terminal ileum. The quality of the preparation was good. Upon withdrawal of the colonoscope, careful visualization of the mucosa was performed. [place] Prep Score: Right Colon 2 Transverse colon 2 Left colon 2  FINDINGS/THERAPEUTICS:  TERMINAL ILEUM: Normal to the extent examined  COLON: Two sessile polyps in ascending colon, 1 and 3 mm removed by cold snare. One 4 mm sessile polyp in the sigmoid colon removed by cold snare. Scattered diverticuli throughout the colon, no active bleeding  RECTUM: Normal.  RECOMMENDATIONS:   Post Colonoscopy/polypectomy precautions, watch for bleeding, infection, perforation, adverse drug reactions   Follow biopsies. Suspect bleeding was diverticular, clinically ceased  OK to resume Coumadin   OK for discharge from GI standpoint.   CC Report:   Fahad Mancera MD  7/4/2023  10:45 AM  José Martínez DO

## 2023-07-04 NOTE — OPERATIVE REPORT
Operative Report-Esophagogastroduodenoscopy with biopsy      PREOPERATIVE DIAGNOSIS/INDICATION:  Dysphagia  POSTOPERTATIVE DIAGNOSIS:  Hiatal hernia -3 cm - Hill Grade II  Gastritis  Duodenal diverticulum  PROCEDURE PERFORMED: EGD with biopsy  INFORMED CONSENT: Once a brief history and physical examination was performed, the risks, benefits and alternatives to the procedure were discussed with the patient and/or family and informed consent was obtained. The risks of sedation, perforation, missed lesions and need for surgery were all discussed. Patient expressed understanding of the risks and agreed to proceed. PROCEDURE DESCRIPTION:  The patient was then brought to the endoscopy suite where his/her pulse, pulse oximetry and blood pressure were monitored. He/she was placed in the left lateral decubitus position and deep sedation was administered. Once adequate sedation was achieved, a bite block was placed and a lubricated tip of an Olympus video upper endoscope was inserted through the oropharynx and gently manipulated through the esophagus into the stomach and the distal duodenum. Upon withdrawal of the endoscope, careful visualization of the mucosa was performed. FINDINGS:  ESOPHAGUS: Normal in course and caliber, no mucosal erosions, ulcers  EGJ: Located at 34 cm, diaphragmatic impression at 37 cm, 3 cm hiatal hernia  STOMACH: Mild mucosal erythema throughout without ulcer, erosion  DUODENUM: Large diverticulum of the second portion, otherwise normal mucosa  THERAPEUTICS: Biopsies were performed of duodenum, antrum/body and mid esophagus  RECOMMENDATIONS:   Post EGD precautions, watch for bleeding, infection, perforation, adverse drug reactions   Follow biopsies. Proceed with colonoscopy  Anti reflux measures.   CC Report:     Arlin Reed MD  7/4/2023  10:13 AM  Bushra Mccain DO

## 2023-07-04 NOTE — PLAN OF CARE
A&Ox4. VSS. RA. . Denies chest pain and SOB. GI: Abdomen soft, nondistended, with tenderness. Passage of gas. Denies nausea. : Voids- Clear yellow with adequate output. Pain managed with PRN pain medications per MAR. Up ad charan with walker. Skin: Bruising BLE and old incision site to Lt knee, CDI. Diet: Clear liquids, NPO at midnight. IVF running per order. All appropriate safety measures in place. All questions and concerns addressed.

## 2023-07-04 NOTE — DISCHARGE SUMMARY
[facility] HOSPITALIST  DISCHARGE SUMMARY     Cabrera Eddy Patient Status:  Observation    1970 MRN [id number]   [facility] 3NW-A Attending Jc Stuart DO   Hosp Day # 0 PCP Ana Paula Bentley DO     Date of Admission: 2023  Date of Discharge:   2023      Discharge Disposition: Home or Self Care    Discharge Diagnosis:  GI bleed  Hypercoagulable State  SLE/RA  Seizure Disorder  Recent Left TKR    History of Present Illness: Cabrera Eddy is a 47year old female with a PMH of DVT. Lupus, RA, fibromyalgia, and seizure disorder presented to ED due to bloody stool that began yesterday. Patient had 1 episode yesterday and 1 today. Blood bright red in color. Admits mild cramping abdominal discomfort. Denies N/V. Denies blood in urine. Of note, patient recently had left knee replaced on . Patient was given antibiotics to prevent infection though developed anaphylaxis and was admitted to [facility] and discharged on Wednesday. Patient reportedly had a seizure when EMS arrived to patient's home and patient was started on keppra during the admission. Brief Synopsis: Pt was admitted and her coumadin was held. She was seen by GI and Hematology and INR trended down on its own. She underwent Colonoscpy and Endoscopy during stay. She had no further bleeding and was toelrating diet prior to DC. Lace+ Score: 58  59-90 High Risk  29-58 Medium Risk  0-28   Low Risk       TCM Follow-Up Recommendation:  LACE > 58:  High Risk of readmission after discharge from the hospital.      Procedures during hospitalization:   EGD/Colonsoscopy    Incidental or significant findings and recommendations (brief descriptions):  none    Lab/Test results pending at Discharge:   none    Consultants:  GI, Heme    Discharge Medication List:     Discharge Medications        CONTINUE taking these medications        Instructions Prescription details   Adult Aerosol Mask Misc      Adult Aerosol Mask   USE AS DIRECTED   Refills: 0     Jet-Air Reusable Nebulizer Sys Kit      Reusable Nebulizer Kit   USE EVERY 4 HOURS AS NEEDED   Refills: 0     albuterol 108 (90 Base) MCG/ACT Aers  Commonly known as: Ventolin HFA      albuterol sulfate HFA 90 mcg/actuation aerosol inhaler   Refills: 0     ALPRAZolam 0.25 MG Tabs  Commonly known as: Xanax      Take 1 tablet (0.25 mg total) by mouth nightly as needed. Refills: 0     ascorbic acid 100 MG Tabs  Commonly known as: VITAMIN C      Take by mouth. Refills: 0     atorvastatin 10 MG Tabs  Commonly known as: Lipitor      Take 1 tablet (10 mg total) by mouth nightly. Refills: 0     Budesonide-Formoterol Fumarate 80-4.5 MCG/ACT Aero  Commonly known as: SYMBICORT      Inhale into the lungs. Refills: 0     butalbital-acetaminophen-caffeine -40 MG Tabs  Commonly known as: Fioricet      Take 1 tablet by mouth every 4 (four) hours as needed for Pain. Refills: 0     cyclobenzaprine 10 MG Tabs  Commonly known as: Flexeril      Take 1 tablet (10 mg total) by mouth as needed for Muscle spasms. Refills: 0     fluconazole 100 MG Tabs  Commonly known as: Diflucan      Take 1 tablet (100 mg total) by mouth as needed (mouth sores as needed). Refills: 0     folic acid 1 MG Tabs  Commonly known as: Folvite      Take 1 tablet (1 mg total) by mouth daily. Quantity: 90 tablet  Refills: 0     gabapentin 100 MG Caps  Commonly known as: Neurontin      Take 3 capsules (300 mg total) by mouth in the morning, at noon, and at bedtime. Refills: 0     hydroxychloroquine 200 MG Tabs  Commonly known as: Plaquenil      Take 1 tablet (200 mg total) by mouth 2 (two) times daily. Quantity: 180 tablet  Refills: 1     levETIRAcetam 500 MG Tabs  Commonly known as: Keppra      Take 1 tablet (500 mg total) by mouth 2 (two) times daily.    Refills: 0     levothyroxine 75 MCG Tabs  Commonly known as: Synthroid      Take 1 tablet (75 mcg total) by mouth before breakfast.   Refills: 0 montelukast 10 MG Tabs  Commonly known as: Singulair      Take 1 tablet (10 mg total) by mouth daily. Refills: 0     ondansetron 4 MG Tbdp  Commonly known as: Zofran-ODT      4 mg every 4 (four) hours as needed. Refills: 0     oxyCODONE 10 MG Tabs      Take 1 tablet (10 mg total) by mouth every 4 (four) hours as needed for Pain. Refills: 0     pantoprazole 20 MG Tbec  Commonly known as: Protonix      Take 1 tablet (20 mg total) by mouth daily. Refills: 0     Potassium Chloride ER 10 MEQ Tbcr  Commonly known as: K-DUR      Take 10 mEq by mouth 2 (two) times daily with meals. Refills: 0     predniSONE 10 MG Tabs  Commonly known as: Deltasone      Take 1 tablet (10 mg total) by mouth daily. Quantity: 90 tablet  Refills: 0     topiramate 100 MG Tabs  Commonly known as: TopaMAX      Take by mouth 2 (two) times daily. Refills: 0     warfarin 10 MG Tabs  Commonly known as: Coumadin      Take as directed. If you are unsure how to take this medication, talk to your nurse or doctor. Original instructions: Take 1 tablet (10 mg total) by mouth As Directed. Tu, Th   Refills: 0     warfarin 2.5 MG Tabs  Commonly known as: Coumadin      Take as directed. If you are unsure how to take this medication, talk to your nurse or doctor. Original instructions: Take 1 tablet (2.5 mg total) by mouth nightly.    Quantity: 30 tablet  Refills: 0            STOP taking these medications      cyanocobalamin 100 MCG Tabs        cyanocobalamin 1000 MCG/ML Soln  Commonly known as: Vitamin B12        Doxycycline Hyclate 100 MG Tabs  Commonly known as: VIBRAMYCIN        enoxaparin 100 MG/ML Sosy  Commonly known as: Lovenox        furosemide 20 MG Tabs  Commonly known as: Lasix        HYDROcodone-acetaminophen  MG Tabs  Commonly known as: Norco        hydrocortisone 10 MG Tabs  Commonly known as: Cortef        levoFLOXacin 500 MG Tabs  Commonly known as: Levaquin        lisinopril 10 MG Tabs  Commonly known as: Zestril Phentermine HCl 37.5 MG Tabs  Commonly known as: ADIPEX-P        potassium chloride 10 MEQ Tbcr  Commonly known as: K-Dur               ASK your doctor about these medications        Instructions Prescription details   Ferrous Sulfate 325 (65 Fe) MG Tabs      Take by mouth. Refills: 0     raNITIdine HCl 150 MG Caps  Commonly known as: ZANTAC      Take by mouth. Refills: 0     warfarin 1 MG Tabs  Commonly known as: Coumadin      Take as directed. If you are unsure how to take this medication, talk to your nurse or doctor. Original instructions: Take 1 tablet (1 mg total) by mouth nightly. Quantity: 30 tablet  Refills: 2     warfarin 4 MG Tabs  Commonly known as: Coumadin  Notes to patient: Take as directed      Take as directed. If you are unsure how to take this medication, talk to your nurse or doctor. Original instructions: Take 1 tablet (4 mg total) by mouth daily. Quantity: 30 tablet  Refills: 2     warfarin 2.5 MG Tabs  Commonly known as: Coumadin      Take as directed. If you are unsure how to take this medication, talk to your nurse or doctor. Original instructions: Take 1-3 tablets (2.5-7.5 mg total) by mouth nightly. Quantity: 90 tablet  Refills: 2     Jantoven 10 MG Tabs  Generic drug: warfarin      Take as directed. If you are unsure how to take this medication, talk to your nurse or doctor.   Original instructions: TAKE 1 TO 1 AND 1/4 TABLETS (10-12.5 mg total) BY MOUTH ONE TIME A DAY AT NIGHT   Quantity: 30 tablet  Refills: 0              [place]    Follow-up appointment:   Steven Sherman, [address] [place] [address] Street [address] [place] [address]  [phone]    Follow up  As needed    Jaguar Fang MD  [address] [facility] [id number]    Follow up      Appointments for Next 30 Days 7/5/2023 - 8/4/2023      None            Vital signs:           -----------------------------------------------------------------------------------------------  PATIENT DISCHARGE INSTRUCTIONS: See electronic chart    Daphne Paula MD    Total time spent on discharge plannin minutes     The [id number] makes medical notes like these available to patients in the interest of transparency. Please be advised this is a medical document. Medical documents are intended to carry relevant information, facts as evident, and the clinical opinion of the practitioner. The medical note is intended as peer to peer communication and may appear blunt or direct. It is written in medical language and may contain abbreviations or verbiage that are unfamiliar.

## 2023-07-04 NOTE — PLAN OF CARE
Alert and oriented X 4. VSS. RA. No nausea or vomiting present. Seizure precautions in place. Abdomen is soft and tender. Bowel prep completed. Patient going to endoscopy for colonoscopy. Up ad charan. NPO tolerating well. Updated on plan of care. Call light in reach.

## 2023-07-17 ENCOUNTER — ANTI-COAG VISIT (OUTPATIENT)
Dept: HEMATOLOGY/ONCOLOGY | Facility: HOSPITAL | Age: 53
End: 2023-07-17

## 2023-07-17 DIAGNOSIS — D68.61 ANTIPHOSPHOLIPID ANTIBODY SYNDROME (HCC): Primary | ICD-10-CM

## 2023-07-17 LAB — INR: 3 (ref 0.8–1.2)

## 2023-07-17 RX ORDER — WARFARIN SODIUM 10 MG/1
TABLET ORAL
Qty: 30 TABLET | Refills: 0 | Status: SHIPPED | OUTPATIENT
Start: 2023-07-17

## 2023-07-17 RX ORDER — WARFARIN SODIUM 2.5 MG/1
TABLET ORAL
Qty: 90 TABLET | Refills: 2 | Status: SHIPPED | OUTPATIENT
Start: 2023-07-17

## 2023-07-25 NOTE — PLAN OF CARE
A&Ox4. Seizure precautions maintained. VSS on room air. Up with assist and walker. PRN tylenol given for pain. Voiding adequately. Passing gas, bowel sounds hypoactive. Denies nausea. Tolerating regular diet. IVF. Problem: Patient/Family Goals  Goal: Patient/Family Long Term Goal  Description: Patient's Long Term Goal: Discharge home    Interventions:  -Monitor labs  -Monitor stools  - See additional Care Plan goals for specific interventions  Outcome: Progressing  Note: Discharge home  Goal: Patient/Family Short Term Goal  Description: Patient's Short Term Goal:  Pain management, monitor for bleeding, adequate hydration    Interventions:   - PRN pain medications  -Monitor labs  -Monitor stools  -NPO  -IVF  - See additional Care Plan goals for specific interventions  Outcome: Progressing  Note: Pain management, monitor for bleeding, adequate hydration     Problem: RISK FOR INFECTION - ADULT  Goal: Absence of fever/infection during anticipated neutropenic period  Description: INTERVENTIONS  - Monitor WBC  - Administer growth factors as ordered  - Implement neutropenic guidelines  Outcome: Progressing     Problem: SAFETY ADULT - FALL  Goal: Free from fall injury  Description: INTERVENTIONS:  - Assess pt frequently for physical needs  - Identify cognitive and physical deficits and behaviors that affect risk of falls.   - [place] fall precautions as indicated by assessment.  - Educate pt/family on patient safety including physical limitations  - Instruct pt to call for assistance with activity based on assessment  - Modify environment to reduce risk of injury  - Provide assistive devices as appropriate  - Consider OT/PT consult to assist with strengthening/mobility  - Encourage toileting schedule  Outcome: Progressing     Problem: DISCHARGE PLANNING  Goal: Discharge to home or other facility with appropriate resources  Description: INTERVENTIONS:  - Identify barriers to discharge w/pt and caregiver  - Include patient/family/discharge partner in discharge planning  - Arrange for needed discharge resources and transportation as appropriate  - Identify discharge learning needs (meds, wound care, etc)  - Arrange for interpreters to assist at discharge as needed  - Consider post-discharge preferences of patient/family/discharge partner  - Complete POLST form as appropriate  - Assess patient's ability to be responsible for managing their own health  - Refer to Case Management Department for coordinating discharge planning if the patient needs post-hospital services based on physician/LIP order or complex needs related to functional status, cognitive ability or social support system  Outcome: Progressing Quality 110: Preventive Care And Screening: Influenza Immunization: Influenza Immunization Administered during Influenza season Detail Level: Detailed Quality 226: Preventive Care And Screening: Tobacco Use: Screening And Cessation Intervention: Patient screened for tobacco use and is an ex/non-smoker Quality 130: Documentation Of Current Medications In The Medical Record: Current Medications Documented

## 2023-08-18 ENCOUNTER — ANTI-COAG VISIT (OUTPATIENT)
Dept: HEMATOLOGY/ONCOLOGY | Facility: HOSPITAL | Age: 53
End: 2023-08-18

## 2023-08-18 DIAGNOSIS — D68.61 ANTIPHOSPHOLIPID ANTIBODY SYNDROME (HCC): Primary | ICD-10-CM

## 2023-08-18 LAB — INR: 2.8 (ref 0.8–1.2)

## 2023-08-28 ENCOUNTER — APPOINTMENT (OUTPATIENT)
Dept: CT IMAGING | Facility: HOSPITAL | Age: 53
End: 2023-08-28
Attending: EMERGENCY MEDICINE
Payer: COMMERCIAL

## 2023-08-28 ENCOUNTER — HOSPITAL ENCOUNTER (OUTPATIENT)
Facility: HOSPITAL | Age: 53
Setting detail: OBSERVATION
Discharge: HOME | End: 2023-08-31
Attending: EMERGENCY MEDICINE | Admitting: HOSPITALIST
Payer: COMMERCIAL

## 2023-08-28 ENCOUNTER — APPOINTMENT (OUTPATIENT)
Dept: GENERAL RADIOLOGY | Facility: HOSPITAL | Age: 53
End: 2023-08-28
Attending: EMERGENCY MEDICINE
Payer: COMMERCIAL

## 2023-08-28 DIAGNOSIS — R11.2 NAUSEA VOMITING AND DIARRHEA: Primary | ICD-10-CM

## 2023-08-28 DIAGNOSIS — R74.8 ELEVATED LIVER ENZYMES: ICD-10-CM

## 2023-08-28 DIAGNOSIS — R19.7 NAUSEA VOMITING AND DIARRHEA: Primary | ICD-10-CM

## 2023-08-28 DIAGNOSIS — R79.1 ELEVATED INR: ICD-10-CM

## 2023-08-28 PROBLEM — K52.9 GASTROENTERITIS: Status: ACTIVE | Noted: 2023-08-28

## 2023-08-28 PROBLEM — E87.6 HYPOKALEMIA: Status: ACTIVE | Noted: 2023-08-28

## 2023-08-28 PROBLEM — E87.20 METABOLIC ACIDOSIS: Status: ACTIVE | Noted: 2023-08-28

## 2023-08-28 PROBLEM — R79.89 ELEVATED LFTS: Status: ACTIVE | Noted: 2023-06-30

## 2023-08-28 PROBLEM — E86.0 DEHYDRATION: Status: ACTIVE | Noted: 2023-08-28

## 2023-08-28 LAB
ADENOVIRUS F 40/41 PCR: NEGATIVE
ALBUMIN SERPL-MCNC: 3.4 G/DL (ref 3.4–5)
ALBUMIN/GLOB SERPL: 0.9 {RATIO} (ref 1–2)
ALP LIVER SERPL-CCNC: 231 U/L
ALT SERPL-CCNC: 106 U/L
ANION GAP SERPL CALC-SCNC: 9 MMOL/L (ref 0–18)
AST SERPL-CCNC: 117 U/L (ref 15–37)
ASTROVIRUS PCR: NEGATIVE
BASOPHILS # BLD AUTO: 0.03 X10(3) UL (ref 0–0.2)
BASOPHILS NFR BLD AUTO: 0.3 %
BILIRUB SERPL-MCNC: 0.7 MG/DL (ref 0.1–2)
BILIRUB UR QL STRIP.AUTO: NEGATIVE
BUN BLD-MCNC: 17 MG/DL (ref 7–18)
C CAYETANENSIS DNA SPEC QL NAA+PROBE: NEGATIVE
C DIFF GDH + TOXINS A+B STL QL IA.RAPID: NOT DETECTED
C DIFF TOX B STL QL: POSITIVE
CALCIUM BLD-MCNC: 8.6 MG/DL (ref 8.5–10.1)
CAMPY SP DNA.DIARRHEA STL QL NAA+PROBE: NEGATIVE
CHLORIDE SERPL-SCNC: 111 MMOL/L (ref 98–112)
CLARITY UR REFRACT.AUTO: CLEAR
CO2 SERPL-SCNC: 19 MMOL/L (ref 21–32)
COLOR UR AUTO: YELLOW
CREAT BLD-MCNC: 0.96 MG/DL
CRYPTOSP DNA SPEC QL NAA+PROBE: NEGATIVE
EAEC PAA PLAS AGGR+AATA ST NAA+NON-PRB: NEGATIVE
EC STX1+STX2 + H7 FLIC SPEC NAA+PROBE: NEGATIVE
EGFRCR SERPLBLD CKD-EPI 2021: 72 ML/MIN/1.73M2 (ref 60–?)
ENTAMOEBA HISTOLYTICA PCR: NEGATIVE
EOSINOPHIL # BLD AUTO: 0.04 X10(3) UL (ref 0–0.7)
EOSINOPHIL NFR BLD AUTO: 0.3 %
EPEC EAE GENE STL QL NAA+NON-PROBE: NEGATIVE
ERYTHROCYTE [DISTWIDTH] IN BLOOD BY AUTOMATED COUNT: 14.6 %
ETEC LTA+ST1A+ST1B TOX ST NAA+NON-PROBE: NEGATIVE
GIARDIA LAMBLIA PCR: NEGATIVE
GLOBULIN PLAS-MCNC: 3.6 G/DL (ref 2.8–4.4)
GLUCOSE BLD-MCNC: 88 MG/DL (ref 70–99)
GLUCOSE UR STRIP.AUTO-MCNC: NORMAL MG/DL
HAV IGM SER QL: NONREACTIVE
HBV CORE IGM SER QL: NONREACTIVE
HBV SURFACE AG SERPL QL IA: NONREACTIVE
HCT VFR BLD AUTO: 44.2 %
HCV AB SERPL QL IA: NONREACTIVE
HGB BLD-MCNC: 14.3 G/DL
HYALINE CASTS #/AREA URNS AUTO: PRESENT /LPF
IMM GRANULOCYTES # BLD AUTO: 0.05 X10(3) UL (ref 0–1)
IMM GRANULOCYTES NFR BLD: 0.4 %
INR BLD: 5.33 (ref 0.85–1.16)
KETONES UR STRIP.AUTO-MCNC: 40 MG/DL
LACTATE SERPL-SCNC: 1.4 MMOL/L (ref 0.4–2)
LEUKOCYTE ESTERASE UR QL STRIP.AUTO: 250
LIPASE SERPL-CCNC: 25 U/L (ref 13–75)
LYMPHOCYTES # BLD AUTO: 1.71 X10(3) UL (ref 1–4)
LYMPHOCYTES NFR BLD AUTO: 14.6 %
MAGNESIUM SERPL-MCNC: 2.4 MG/DL (ref 1.6–2.6)
MCH RBC QN AUTO: 26.6 PG (ref 26–34)
MCHC RBC AUTO-ENTMCNC: 32.4 G/DL (ref 31–37)
MCV RBC AUTO: 82.3 FL
MONOCYTES # BLD AUTO: 0.61 X10(3) UL (ref 0.1–1)
MONOCYTES NFR BLD AUTO: 5.2 %
NEUTROPHILS # BLD AUTO: 9.26 X10 (3) UL (ref 1.5–7.7)
NEUTROPHILS # BLD AUTO: 9.26 X10(3) UL (ref 1.5–7.7)
NEUTROPHILS NFR BLD AUTO: 79.2 %
NITRITE UR QL STRIP.AUTO: NEGATIVE
NOROVIRUS GI/GII PCR: NEGATIVE
OSMOLALITY SERPL CALC.SUM OF ELEC: 289 MOSM/KG (ref 275–295)
P SHIGELLOIDES DNA STL QL NAA+PROBE: NEGATIVE
PH UR STRIP.AUTO: 5 [PH] (ref 5–8)
PLATELET # BLD AUTO: 195 10(3)UL (ref 150–450)
POTASSIUM SERPL-SCNC: 3.5 MMOL/L (ref 3.5–5.1)
PROT SERPL-MCNC: 7 G/DL (ref 6.4–8.2)
PROT UR STRIP.AUTO-MCNC: 30 MG/DL
PROTHROMBIN TIME: 47.8 SECONDS (ref 11.6–14.8)
RBC # BLD AUTO: 5.37 X10(6)UL
ROTAVIRUS A PCR: NEGATIVE
SALMONELLA DNA SPEC QL NAA+PROBE: NEGATIVE
SAPOVIRUS PCR: NEGATIVE
SARS-COV-2 RNA RESP QL NAA+PROBE: NOT DETECTED
SHIGELLA SP+EIEC IPAH ST NAA+NON-PROBE: NEGATIVE
SODIUM SERPL-SCNC: 139 MMOL/L (ref 136–145)
SP GR UR STRIP.AUTO: 1.02 (ref 1–1.03)
UROBILINOGEN UR STRIP.AUTO-MCNC: NORMAL MG/DL
V CHOLERAE DNA SPEC QL NAA+PROBE: NEGATIVE
VIBRIO DNA SPEC NAA+PROBE: NEGATIVE
WBC # BLD AUTO: 11.7 X10(3) UL (ref 4–11)
YERSINIA DNA SPEC NAA+PROBE: NEGATIVE

## 2023-08-28 PROCEDURE — 74177 CT ABD & PELVIS W/CONTRAST: CPT | Performed by: EMERGENCY MEDICINE

## 2023-08-28 PROCEDURE — 99223 1ST HOSP IP/OBS HIGH 75: CPT | Performed by: HOSPITALIST

## 2023-08-28 PROCEDURE — 71045 X-RAY EXAM CHEST 1 VIEW: CPT | Performed by: EMERGENCY MEDICINE

## 2023-08-28 RX ORDER — ATORVASTATIN CALCIUM 10 MG/1
10 TABLET, FILM COATED ORAL NIGHTLY
Status: DISCONTINUED | OUTPATIENT
Start: 2023-08-28 | End: 2023-08-31

## 2023-08-28 RX ORDER — MELATONIN
3 NIGHTLY PRN
Status: DISCONTINUED | OUTPATIENT
Start: 2023-08-28 | End: 2023-08-31

## 2023-08-28 RX ORDER — DIPHENHYDRAMINE HYDROCHLORIDE 50 MG/ML
25 INJECTION INTRAMUSCULAR; INTRAVENOUS ONCE
Status: COMPLETED | OUTPATIENT
Start: 2023-08-28 | End: 2023-08-28

## 2023-08-28 RX ORDER — PANTOPRAZOLE SODIUM 20 MG/1
20 TABLET, DELAYED RELEASE ORAL DAILY
Status: DISCONTINUED | OUTPATIENT
Start: 2023-08-28 | End: 2023-08-31

## 2023-08-28 RX ORDER — HYDROMORPHONE HYDROCHLORIDE 1 MG/ML
0.5 INJECTION, SOLUTION INTRAMUSCULAR; INTRAVENOUS; SUBCUTANEOUS EVERY 30 MIN PRN
Status: DISCONTINUED | OUTPATIENT
Start: 2023-08-28 | End: 2023-08-31

## 2023-08-28 RX ORDER — SENNOSIDES 8.6 MG
17.2 TABLET ORAL NIGHTLY PRN
Status: DISCONTINUED | OUTPATIENT
Start: 2023-08-28 | End: 2023-08-31

## 2023-08-28 RX ORDER — SODIUM CHLORIDE 9 MG/ML
INJECTION, SOLUTION INTRAVENOUS CONTINUOUS
Status: DISCONTINUED | OUTPATIENT
Start: 2023-08-28 | End: 2023-08-29

## 2023-08-28 RX ORDER — ONDANSETRON 2 MG/ML
8 INJECTION INTRAMUSCULAR; INTRAVENOUS EVERY 6 HOURS PRN
Status: DISCONTINUED | OUTPATIENT
Start: 2023-08-28 | End: 2023-08-31

## 2023-08-28 RX ORDER — FAMOTIDINE 10 MG/ML
20 INJECTION, SOLUTION INTRAVENOUS ONCE
Status: COMPLETED | OUTPATIENT
Start: 2023-08-28 | End: 2023-08-28

## 2023-08-28 RX ORDER — ECHINACEA PURPUREA EXTRACT 125 MG
1 TABLET ORAL
Status: DISCONTINUED | OUTPATIENT
Start: 2023-08-28 | End: 2023-08-31

## 2023-08-28 RX ORDER — BISACODYL 10 MG
10 SUPPOSITORY, RECTAL RECTAL
Status: DISCONTINUED | OUTPATIENT
Start: 2023-08-28 | End: 2023-08-31

## 2023-08-28 RX ORDER — FOLIC ACID 1 MG/1
1 TABLET ORAL DAILY
Status: DISCONTINUED | OUTPATIENT
Start: 2023-08-28 | End: 2023-08-31

## 2023-08-28 RX ORDER — BENZONATATE 200 MG/1
200 CAPSULE ORAL 3 TIMES DAILY PRN
Status: DISCONTINUED | OUTPATIENT
Start: 2023-08-28 | End: 2023-08-31

## 2023-08-28 RX ORDER — VANCOMYCIN HYDROCHLORIDE 50 MG/ML
125 KIT ORAL EVERY 6 HOURS
Status: DISCONTINUED | OUTPATIENT
Start: 2023-08-28 | End: 2023-08-28 | Stop reason: SDUPTHER

## 2023-08-28 RX ORDER — POLYETHYLENE GLYCOL 3350 17 G/17G
17 POWDER, FOR SOLUTION ORAL DAILY PRN
Status: DISCONTINUED | OUTPATIENT
Start: 2023-08-28 | End: 2023-08-31

## 2023-08-28 RX ORDER — VANCOMYCIN HYDROCHLORIDE 125 MG/1
125 CAPSULE ORAL EVERY 6 HOURS
Status: DISCONTINUED | OUTPATIENT
Start: 2023-08-29 | End: 2023-08-31

## 2023-08-28 RX ORDER — MONTELUKAST SODIUM 10 MG/1
10 TABLET ORAL DAILY
Status: DISCONTINUED | OUTPATIENT
Start: 2023-08-28 | End: 2023-08-31

## 2023-08-28 RX ORDER — FLUTICASONE FUROATE AND VILANTEROL 200; 25 UG/1; UG/1
1 POWDER RESPIRATORY (INHALATION) DAILY
Status: DISCONTINUED | OUTPATIENT
Start: 2023-08-28 | End: 2023-08-31

## 2023-08-28 RX ORDER — OXYCODONE HYDROCHLORIDE 10 MG/1
10 TABLET ORAL EVERY 4 HOURS PRN
Status: DISCONTINUED | OUTPATIENT
Start: 2023-08-28 | End: 2023-08-31

## 2023-08-28 RX ORDER — ACETAMINOPHEN 325 MG/1
325 TABLET ORAL EVERY 8 HOURS PRN
Status: DISCONTINUED | OUTPATIENT
Start: 2023-08-28 | End: 2023-08-31

## 2023-08-28 RX ORDER — HYDROXYCHLOROQUINE SULFATE 200 MG/1
200 TABLET, FILM COATED ORAL 2 TIMES DAILY
Status: DISCONTINUED | OUTPATIENT
Start: 2023-08-28 | End: 2023-08-31

## 2023-08-28 RX ORDER — CYCLOBENZAPRINE HCL 10 MG
10 TABLET ORAL AS NEEDED
Status: DISCONTINUED | OUTPATIENT
Start: 2023-08-28 | End: 2023-08-31

## 2023-08-28 RX ORDER — LEVETIRACETAM 500 MG/1
500 TABLET ORAL 2 TIMES DAILY
Status: DISCONTINUED | OUTPATIENT
Start: 2023-08-28 | End: 2023-08-29

## 2023-08-28 RX ORDER — BUTALBITAL, ACETAMINOPHEN AND CAFFEINE 50; 325; 40 MG/1; MG/1; MG/1
1 TABLET ORAL EVERY 6 HOURS PRN
Status: DISCONTINUED | OUTPATIENT
Start: 2023-08-28 | End: 2023-08-31

## 2023-08-28 RX ORDER — FAMOTIDINE 20 MG/1
20 TABLET, FILM COATED ORAL 2 TIMES DAILY
Status: DISCONTINUED | OUTPATIENT
Start: 2023-08-28 | End: 2023-08-31

## 2023-08-28 RX ORDER — ONDANSETRON 2 MG/ML
4 INJECTION INTRAMUSCULAR; INTRAVENOUS ONCE
Status: COMPLETED | OUTPATIENT
Start: 2023-08-28 | End: 2023-08-28

## 2023-08-28 RX ORDER — METOCLOPRAMIDE HYDROCHLORIDE 5 MG/ML
10 INJECTION INTRAMUSCULAR; INTRAVENOUS ONCE
Status: COMPLETED | OUTPATIENT
Start: 2023-08-28 | End: 2023-08-28

## 2023-08-28 RX ORDER — LEVOTHYROXINE SODIUM 88 UG/1
88 TABLET ORAL
Status: DISCONTINUED | OUTPATIENT
Start: 2023-08-29 | End: 2023-08-31

## 2023-08-28 RX ORDER — ALBUTEROL SULFATE 90 UG/1
1 AEROSOL, METERED RESPIRATORY (INHALATION) EVERY 4 HOURS PRN
Status: DISCONTINUED | OUTPATIENT
Start: 2023-08-28 | End: 2023-08-31

## 2023-08-28 RX ORDER — GABAPENTIN 300 MG/1
300 CAPSULE ORAL 3 TIMES DAILY
Status: DISCONTINUED | OUTPATIENT
Start: 2023-08-28 | End: 2023-08-31

## 2023-08-28 RX ORDER — TOPIRAMATE 25 MG/1
100 TABLET ORAL 2 TIMES DAILY
Status: DISCONTINUED | OUTPATIENT
Start: 2023-08-28 | End: 2023-08-31

## 2023-08-28 RX ORDER — ALPRAZOLAM 0.25 MG/1
0.25 TABLET ORAL NIGHTLY PRN
Status: DISCONTINUED | OUTPATIENT
Start: 2023-08-28 | End: 2023-08-31

## 2023-08-28 RX ORDER — LEVOTHYROXINE SODIUM 88 UG/1
88 TABLET ORAL
COMMUNITY

## 2023-08-28 RX ORDER — PREDNISONE 10 MG/1
10 TABLET ORAL DAILY
Status: DISCONTINUED | OUTPATIENT
Start: 2023-08-28 | End: 2023-08-31

## 2023-08-28 NOTE — PLAN OF CARE
Problem: Patient/Family Goals  Goal: Patient/Family Long Term Goal  Description: Patient's Long Term Goal: ***    Interventions:  - ***  - See additional Care Plan goals for specific interventions  Outcome: Progressing  Goal: Patient/Family Short Term Goal  Description: Patient's Short Term Goal: ***    Interventions:   - ***  - See additional Care Plan goals for specific interventions  Outcome: Progressing

## 2023-08-28 NOTE — ED QUICK NOTES
Multiple venipuncture attempts for second set of blood cultures unsuccessful. Discussed with MD and Ok to skip second set.

## 2023-08-28 NOTE — ED QUICK NOTES
Orders for admission, patient is aware of plan and ready to go upstairs. Any questions, please call ED RN Jeovany Allen at extension [phone].      Patient Covid vaccination status: Fully vaccinated     COVID Test Ordered in ED: Rapid SARS-CoV-2 by PCR    COVID Suspicion at Admission: N/A    Running Infusions:    sodium chloride Stopped (08/28/23 5632)    None    Mental Status/LOC at time of transport: A/O x4    Other pertinent information:   CIWA score: N/A   NIH score:  N/A

## 2023-08-28 NOTE — PROGRESS NOTES
NURSING ADMISSION NOTE      Patient admitted via Cart  Oriented to room. Safety precautions initiated. Bed in low position. Call light in reach. Cdiff (+) on Vanco.    A/Ox4. Up ad charan.

## 2023-08-29 ENCOUNTER — APPOINTMENT (OUTPATIENT)
Dept: ULTRASOUND IMAGING | Facility: HOSPITAL | Age: 53
End: 2023-08-29
Attending: HOSPITALIST
Payer: COMMERCIAL

## 2023-08-29 LAB
ALBUMIN SERPL-MCNC: 3 G/DL (ref 3.4–5)
ALBUMIN/GLOB SERPL: 0.8 {RATIO} (ref 1–2)
ALP LIVER SERPL-CCNC: 190 U/L
ALT SERPL-CCNC: 65 U/L
ANION GAP SERPL CALC-SCNC: 8 MMOL/L (ref 0–18)
AST SERPL-CCNC: 46 U/L (ref 15–37)
BILIRUB SERPL-MCNC: 0.5 MG/DL (ref 0.1–2)
BUN BLD-MCNC: 15 MG/DL (ref 7–18)
CALCIUM BLD-MCNC: 8.7 MG/DL (ref 8.5–10.1)
CHLORIDE SERPL-SCNC: 115 MMOL/L (ref 98–112)
CO2 SERPL-SCNC: 17 MMOL/L (ref 21–32)
CREAT BLD-MCNC: 0.78 MG/DL
EGFRCR SERPLBLD CKD-EPI 2021: 92 ML/MIN/1.73M2 (ref 60–?)
ERYTHROCYTE [DISTWIDTH] IN BLOOD BY AUTOMATED COUNT: 14.6 %
GLOBULIN PLAS-MCNC: 3.8 G/DL (ref 2.8–4.4)
GLUCOSE BLD-MCNC: 93 MG/DL (ref 70–99)
HCT VFR BLD AUTO: 40.6 %
HGB BLD-MCNC: 13.1 G/DL
INR BLD: 9.82 (ref 0.85–1.16)
MAGNESIUM SERPL-MCNC: 2.4 MG/DL (ref 1.6–2.6)
MCH RBC QN AUTO: 26.6 PG (ref 26–34)
MCHC RBC AUTO-ENTMCNC: 32.3 G/DL (ref 31–37)
MCV RBC AUTO: 82.5 FL
OSMOLALITY SERPL CALC.SUM OF ELEC: 291 MOSM/KG (ref 275–295)
PHOSPHATE SERPL-MCNC: 2.1 MG/DL (ref 2.5–4.9)
PLATELET # BLD AUTO: 201 10(3)UL (ref 150–450)
POTASSIUM SERPL-SCNC: 3.8 MMOL/L (ref 3.5–5.1)
POTASSIUM SERPL-SCNC: 3.8 MMOL/L (ref 3.5–5.1)
PROT SERPL-MCNC: 6.8 G/DL (ref 6.4–8.2)
PROTHROMBIN TIME: 76.5 SECONDS (ref 11.6–14.8)
RBC # BLD AUTO: 4.92 X10(6)UL
SODIUM SERPL-SCNC: 140 MMOL/L (ref 136–145)
WBC # BLD AUTO: 11.3 X10(3) UL (ref 4–11)

## 2023-08-29 PROCEDURE — 99232 SBSQ HOSP IP/OBS MODERATE 35: CPT | Performed by: INTERNAL MEDICINE

## 2023-08-29 PROCEDURE — 76700 US EXAM ABDOM COMPLETE: CPT | Performed by: HOSPITALIST

## 2023-08-29 RX ORDER — SODIUM CHLORIDE, SODIUM LACTATE, POTASSIUM CHLORIDE, CALCIUM CHLORIDE 600; 310; 30; 20 MG/100ML; MG/100ML; MG/100ML; MG/100ML
INJECTION, SOLUTION INTRAVENOUS CONTINUOUS
Status: DISCONTINUED | OUTPATIENT
Start: 2023-08-29 | End: 2023-08-31

## 2023-08-29 NOTE — PLAN OF CARE
Pt is Aox4, wears glasses and upper/lower denture but left them at home, VSS on RA, tele w/ NSR, Coumadin on hold d/t elevated INR, up to void SBA, receiving PO vanco and NS @100. Call light is within reach and pt updated on POC.     Problem: Patient/Family Goals  Goal: Patient/Family Long Term Goal  Description: Patient's Long Term Goal: To dc home    Interventions:  - PO Vanco  - resolve nausea  - See additional Care Plan goals for specific interventions  Outcome: Progressing  Goal: Patient/Family Short Term Goal  Description: Patient's Short Term Goal: To resolve nausea    Interventions:   - IV zofran  - NPO  - See additional Care Plan goals for specific interventions  Outcome: Progressing     Problem: GASTROINTESTINAL - ADULT  Goal: Minimal or absence of nausea and vomiting  Description: INTERVENTIONS:  - Maintain adequate hydration with IV or PO as ordered and tolerated  - Nasogastric tube to low intermittent suction as ordered  - Evaluate effectiveness of ordered antiemetic medications  - Provide nonpharmacologic comfort measures as appropriate  - Advance diet as tolerated, if ordered  - Obtain nutritional consult as needed  - Evaluate fluid balance  Outcome: Progressing  Goal: Maintains or returns to baseline bowel function  Description: INTERVENTIONS:  - Assess bowel function  - Maintain adequate hydration with IV or PO as ordered and tolerated  - Evaluate effectiveness of GI medications  - Encourage mobilization and activity  - Obtain nutritional consult as needed  - Establish a toileting routine/schedule  - Consider collaborating with pharmacy to review patient's medication profile  Outcome: Progressing  Goal: Maintains adequate nutritional intake (undernourished)  Description: INTERVENTIONS:  - Monitor percentage of each meal consumed  - Identify factors contributing to decreased intake, treat as appropriate  - Assist with meals as needed  - Monitor I&O, WT and lab values  - Obtain nutritional consult as needed  - Optimize oral hygiene and moisture  - Encourage food from home; allow for food preferences  - Enhance eating environment  Outcome: Progressing     Problem: RISK FOR INFECTION - ADULT  Goal: Absence of fever/infection during anticipated neutropenic period  Description: INTERVENTIONS  - Monitor WBC  - Administer growth factors as ordered  - Implement neutropenic guidelines  Outcome: Progressing

## 2023-08-29 NOTE — PROGRESS NOTES
[facility] Dosing Service  Warfarin (Coumadin) Subsequent Dosing    Denita Bryant is a 47year old patient for whom pharmacy is dosing warfarin (Coumadin). Goal INR is 2.5-3.5 as determined by outpatient hematology    Recent Labs   Lab 08/28/23  1303 08/29/23  1036   INR 5.33* 9.82*       Consulted by:  Dr. Max Alexander  Indication:  hx of DVT / hypercoagulable state  Potential Drug Interactions:  levothyroxine, prednisone (home meds)  Other Anticoagulants:  none  Home regimen (if applicable):  10 mg TuTh, 7.5 mg ROW    Inpatient Dosing History:    Date 8/28 8/29       INR 5.33 9.82       Coumadin dose Held Vit K PO                Based on above -  1. For today, Hold warfarin (COUMADIN) dose  2   PT/INR ordered daily while on warfarin  3. Pharmacy will continue to follow. We appreciate the opportunity to assist in the care of this patient.     Samir Hart, PharmD  8/29/2023  12:49 PM

## 2023-08-29 NOTE — PLAN OF CARE
8/29 AM: Pt is A/O x 4, family at bedside. Lung sounds are clear, on room air. BP and HR are WNL, NSR on tele. Patient reported 8 episodes of diarrhea today, voids in bathroom. Patient is SBA. LR going at 100 mL/hr. Elevated INR this AM, paged hospitalist, administered Vitamin K. Diet advanced to CLD.      Problem: Patient/Family Goals  Goal: Patient/Family Long Term Goal  Description: Patient's Long Term Goal: To dc home    Interventions:  - PO Vanco  - resolve nausea  - See additional Care Plan goals for specific interventions  Outcome: Progressing  Goal: Patient/Family Short Term Goal  Description: Patient's Short Term Goal: To resolve nausea  8/29 AM: Monitor stool    Interventions:   - IV zofran  - NPO  - See additional Care Plan goals for specific interventions  Outcome: Progressing

## 2023-08-29 NOTE — CONSULTS
[facility] Dosing Service  Warfarin (Coumadin) Initial Dosing    Denita Charles is a 47year old patient for whom pharmacy has been consulted to dose warfarin (COUMADIN) for Prophylaxis of venous thrombosis by Dr. Gabriele Seo. Based on this indication, goal INR is 2.5-3.5. Pertinent Patient Medical History:  Hx of DVT  Potential Drug Interactions:  None    Latest INR:   Recent Labs     08/28/23  1303   INR 5.33*     Other Anticoagulants:  None    Home regimen (if applicable):    Coumadin 10mg Tu/Th  Coumadin 7.5mg ROW   Date/Time last dose was given (if applicable): 1/54    Based on above -  1. For today, Hold warfarin (COUMADIN) dose    2. PT/INR ordered daily while on warfarin    3. Pharmacy will continue to follow. We appreciate the opportunity to assist in the care of this patient.     Zenaida Andres, PharmD  8/28/2023  6:43 PM

## 2023-08-30 LAB
ALBUMIN SERPL-MCNC: 2.8 G/DL (ref 3.4–5)
ALBUMIN/GLOB SERPL: 0.8 {RATIO} (ref 1–2)
ALP LIVER SERPL-CCNC: 151 U/L
ALT SERPL-CCNC: 47 U/L
ANION GAP SERPL CALC-SCNC: 7 MMOL/L (ref 0–18)
AST SERPL-CCNC: 19 U/L (ref 15–37)
BILIRUB SERPL-MCNC: 0.7 MG/DL (ref 0.1–2)
BUN BLD-MCNC: 16 MG/DL (ref 7–18)
CALCIUM BLD-MCNC: 8.7 MG/DL (ref 8.5–10.1)
CHLORIDE SERPL-SCNC: 117 MMOL/L (ref 98–112)
CO2 SERPL-SCNC: 21 MMOL/L (ref 21–32)
CREAT BLD-MCNC: 0.69 MG/DL
EGFRCR SERPLBLD CKD-EPI 2021: 105 ML/MIN/1.73M2 (ref 60–?)
GLOBULIN PLAS-MCNC: 3.5 G/DL (ref 2.8–4.4)
GLUCOSE BLD-MCNC: 93 MG/DL (ref 70–99)
INR BLD: 1.53 (ref 0.85–1.16)
OSMOLALITY SERPL CALC.SUM OF ELEC: 301 MOSM/KG (ref 275–295)
POTASSIUM SERPL-SCNC: 3.1 MMOL/L (ref 3.5–5.1)
PROT SERPL-MCNC: 6.3 G/DL (ref 6.4–8.2)
PROTHROMBIN TIME: 18.3 SECONDS (ref 11.6–14.8)
SODIUM SERPL-SCNC: 145 MMOL/L (ref 136–145)

## 2023-08-30 PROCEDURE — 99232 SBSQ HOSP IP/OBS MODERATE 35: CPT | Performed by: HOSPITALIST

## 2023-08-30 RX ORDER — POTASSIUM CHLORIDE 20 MEQ/1
40 TABLET, EXTENDED RELEASE ORAL ONCE
Status: COMPLETED | OUTPATIENT
Start: 2023-08-30 | End: 2023-08-30

## 2023-08-30 RX ORDER — WARFARIN SODIUM 5 MG/1
5 TABLET ORAL
Status: COMPLETED | OUTPATIENT
Start: 2023-08-30 | End: 2023-08-30

## 2023-08-30 NOTE — PROGRESS NOTES
[facility] Dosing Service  Warfarin (Coumadin) Subsequent Dosing    Denita Kaur is a 47year old patient for whom pharmacy is dosing warfarin (Coumadin). Goal INR is 2.5-3.5 as determined by outpatient hematology     Recent Labs   Lab 08/28/23  1303 08/29/23  1036 08/30/23  0742   INR 5.33* 9.82* 1.53*       Consulted by:  Dr. Jose Eduardo Bosch  Indication:  hx of DVT / hypercoagulable state  Potential Drug Interactions:  levothyroxine, prednisone (home meds)  Other Anticoagulants:  none  Home regimen (if applicable):  10 mg TuTh, 7.5 mg ROW    Inpatient Dosing History:     Date 8/28 8/29 8/30         INR 5.33 9.82  1.53         Coumadin dose Held Vit K PO 5mg                                                                                        Based on above -  1. For today, Give warfarin (COUMADIN) 5 mg at 2100 tonight  2   PT/INR ordered daily while on warfarin  3. Pharmacy will continue to follow. We appreciate the opportunity to assist in the care of this patient.     Angelique Noble, PharmD  8/30/2023  12:16 PM

## 2023-08-30 NOTE — PLAN OF CARE
Pt is Aox4, wears glasses and upper/lower denture, VSS on RA, tele w/ NSR, Coumadin on hold d/t elevated INR, up to void at charan, receiving PO vanco and LR @100. Call light is within reach and pt updated on POC.      Problem: Patient/Family Goals  Goal: Patient/Family Long Term Goal  Description: Patient's Long Term Goal: To dc home    Interventions:  - PO Vanco  - resolve nausea  - See additional Care Plan goals for specific interventions  Outcome: Progressing  Goal: Patient/Family Short Term Goal  Description: Patient's Short Term Goal: To resolve nausea  8/29 AM: Monitor stool    Interventions:   - IV zofran  - NPO  - See additional Care Plan goals for specific interventions  Outcome: Progressing     Problem: GASTROINTESTINAL - ADULT  Goal: Minimal or absence of nausea and vomiting  Description: INTERVENTIONS:  - Maintain adequate hydration with IV or PO as ordered and tolerated  - Nasogastric tube to low intermittent suction as ordered  - Evaluate effectiveness of ordered antiemetic medications  - Provide nonpharmacologic comfort measures as appropriate  - Advance diet as tolerated, if ordered  - Obtain nutritional consult as needed  - Evaluate fluid balance  Outcome: Progressing  Goal: Maintains or returns to baseline bowel function  Description: INTERVENTIONS:  - Assess bowel function  - Maintain adequate hydration with IV or PO as ordered and tolerated  - Evaluate effectiveness of GI medications  - Encourage mobilization and activity  - Obtain nutritional consult as needed  - Establish a toileting routine/schedule  - Consider collaborating with pharmacy to review patient's medication profile  Outcome: Progressing  Goal: Maintains adequate nutritional intake (undernourished)  Description: INTERVENTIONS:  - Monitor percentage of each meal consumed  - Identify factors contributing to decreased intake, treat as appropriate  - Assist with meals as needed  - Monitor I&O, WT and lab values  - Obtain nutritional consult as needed  - Optimize oral hygiene and moisture  - Encourage food from home; allow for food preferences  - Enhance eating environment  Outcome: Progressing     Problem: RISK FOR INFECTION - ADULT  Goal: Absence of fever/infection during anticipated neutropenic period  Description: INTERVENTIONS  - Monitor WBC  - Administer growth factors as ordered  - Implement neutropenic guidelines  Outcome: Progressing

## 2023-08-30 NOTE — PROGRESS NOTES
08/30/23 8586   Provider Notification   Reason for Communication Evaluate   Provider Name Other (comment)  Patricia Headley   Method of Communication Page   Response Per protocol; No new orders   Notification Time 0627     IV access lost, unable to obtain new access d/t pt being a hard stick. Pt is supposed to dc today, MD gave the okay to have no IV access as of now.

## 2023-08-30 NOTE — PLAN OF CARE
08/30 am: Patient is A/O x 4 , tolerating low soft fiber diet. BP and HR WNL on tele. Reports voiding in bathroom. Pt with poor appetite, possible consult with dietary. No PIV access. No pain reported. No fever present. Patient reports seven soft BM. Patient reports mouth soreness/ irritation. Patient started on Nystatin mouth rinse.      Problem: Patient/Family Goals  Goal: Patient/Family Long Term Goal  Description: Patient's Long Term Goal: To dc home    Interventions:  - PO Vanco  - resolve nausea  - See additional Care Plan goals for specific interventions  Outcome: Progressing  Goal: Patient/Family Short Term Goal  Description: Patient's Short Term Goal: To resolve nausea  8/29 AM: Monitor stool  8/30 AM: Eat    Interventions:   - IV zofran  - NPO  - See additional Care Plan goals for specific interventions  Outcome: Progressing     Problem: GASTROINTESTINAL - ADULT  Goal: Minimal or absence of nausea and vomiting  Description: INTERVENTIONS:  - Maintain adequate hydration with IV or PO as ordered and tolerated  - Nasogastric tube to low intermittent suction as ordered  - Evaluate effectiveness of ordered antiemetic medications  - Provide nonpharmacologic comfort measures as appropriate  - Advance diet as tolerated, if ordered  - Obtain nutritional consult as needed  - Evaluate fluid balance  Outcome: Progressing  Goal: Maintains or returns to baseline bowel function  Description: INTERVENTIONS:  - Assess bowel function  - Maintain adequate hydration with IV or PO as ordered and tolerated  - Evaluate effectiveness of GI medications  - Encourage mobilization and activity  - Obtain nutritional consult as needed  - Establish a toileting routine/schedule  - Consider collaborating with pharmacy to review patient's medication profile  Outcome: Progressing  Goal: Maintains adequate nutritional intake (undernourished)  Description: INTERVENTIONS:  - Monitor percentage of each meal consumed  - Identify factors contributing to decreased intake, treat as appropriate  - Assist with meals as needed  - Monitor I&O, WT and lab values  - Obtain nutritional consult as needed  - Optimize oral hygiene and moisture  - Encourage food from home; allow for food preferences  - Enhance eating environment  Outcome: Progressing     Problem: RISK FOR INFECTION - ADULT  Goal: Absence of fever/infection during anticipated neutropenic period  Description: INTERVENTIONS  - Monitor WBC  - Administer growth factors as ordered  - Implement neutropenic guidelines  Outcome: Progressing

## 2023-08-31 ENCOUNTER — TELEPHONE (OUTPATIENT)
Dept: HEMATOLOGY/ONCOLOGY | Facility: HOSPITAL | Age: 53
End: 2023-08-31

## 2023-08-31 VITALS
RESPIRATION RATE: 20 BRPM | TEMPERATURE: 98 F | OXYGEN SATURATION: 100 % | SYSTOLIC BLOOD PRESSURE: 115 MMHG | HEIGHT: 62 IN | BODY MASS INDEX: 38.75 KG/M2 | HEART RATE: 78 BPM | DIASTOLIC BLOOD PRESSURE: 72 MMHG | WEIGHT: 210.56 LBS

## 2023-08-31 PROBLEM — A04.72 CLOSTRIDIUM DIFFICILE COLITIS: Status: ACTIVE | Noted: 2023-08-31

## 2023-08-31 LAB
ANION GAP SERPL CALC-SCNC: 4 MMOL/L (ref 0–18)
BUN BLD-MCNC: 16 MG/DL (ref 7–18)
CALCIUM BLD-MCNC: 8.6 MG/DL (ref 8.5–10.1)
CHLORIDE SERPL-SCNC: 119 MMOL/L (ref 98–112)
CO2 SERPL-SCNC: 21 MMOL/L (ref 21–32)
CREAT BLD-MCNC: 0.71 MG/DL
EGFRCR SERPLBLD CKD-EPI 2021: 103 ML/MIN/1.73M2 (ref 60–?)
GLUCOSE BLD-MCNC: 90 MG/DL (ref 70–99)
INR BLD: 1.19 (ref 0.85–1.16)
MAGNESIUM SERPL-MCNC: 2.1 MG/DL (ref 1.6–2.6)
OSMOLALITY SERPL CALC.SUM OF ELEC: 299 MOSM/KG (ref 275–295)
POTASSIUM SERPL-SCNC: 3.5 MMOL/L (ref 3.5–5.1)
PROTHROMBIN TIME: 15.1 SECONDS (ref 11.6–14.8)
SODIUM SERPL-SCNC: 144 MMOL/L (ref 136–145)

## 2023-08-31 PROCEDURE — 99239 HOSP IP/OBS DSCHRG MGMT >30: CPT | Performed by: HOSPITALIST

## 2023-08-31 RX ORDER — GARLIC EXTRACT 500 MG
1 CAPSULE ORAL DAILY
Qty: 14 CAPSULE | Refills: 0 | Status: SHIPPED | OUTPATIENT
Start: 2023-08-31

## 2023-08-31 RX ORDER — VANCOMYCIN HYDROCHLORIDE 125 MG/1
125 CAPSULE ORAL EVERY 6 HOURS
Qty: 40 CAPSULE | Refills: 0 | Status: SHIPPED | OUTPATIENT
Start: 2023-08-31 | End: 2023-09-10

## 2023-08-31 NOTE — PROGRESS NOTES
NURSING DISCHARGE NOTE    Discharged Home via Wheelchair. Accompanied by Support staff  Belongings Taken by patient/family. Discharge instructions explained to patient at bedside. Patient's belongings taken by patient. No further questions at this time.

## 2023-08-31 NOTE — PLAN OF CARE
Patient is alert and oriented x4. Wdl on RA. NS on tele. VSS. Coumadin resumed tonight. Voids. C.diff+ on PO vanco, reports improvement in diarrhea. No PIV accesss; md aware. On fall/seizure prec. No c/o pain at this time. POC discussed with pt, pt verbalizes understanding.    Problem: Patient/Family Goals  Goal: Patient/Family Long Term Goal  Description: Patient's Long Term Goal: To dc home    Interventions:  - PO Vanco  - resolve nausea  - See additional Care Plan goals for specific interventions  Outcome: Progressing  Goal: Patient/Family Short Term Goal  Description: Patient's Short Term Goal: To resolve nausea  8/29 AM: Monitor stool  8/30 AM: Eat  8/30 noc: go home tomorrow    Interventions:   - IV zofran  - NPO  - See additional Care Plan goals for specific interventions  Outcome: Progressing     Problem: GASTROINTESTINAL - ADULT  Goal: Minimal or absence of nausea and vomiting  Description: INTERVENTIONS:  - Maintain adequate hydration with IV or PO as ordered and tolerated  - Nasogastric tube to low intermittent suction as ordered  - Evaluate effectiveness of ordered antiemetic medications  - Provide nonpharmacologic comfort measures as appropriate  - Advance diet as tolerated, if ordered  - Obtain nutritional consult as needed  - Evaluate fluid balance  Outcome: Progressing  Goal: Maintains or returns to baseline bowel function  Description: INTERVENTIONS:  - Assess bowel function  - Maintain adequate hydration with IV or PO as ordered and tolerated  - Evaluate effectiveness of GI medications  - Encourage mobilization and activity  - Obtain nutritional consult as needed  - Establish a toileting routine/schedule  - Consider collaborating with pharmacy to review patient's medication profile  Outcome: Progressing  Goal: Maintains adequate nutritional intake (undernourished)  Description: INTERVENTIONS:  - Monitor percentage of each meal consumed  - Identify factors contributing to decreased intake, treat as appropriate  - Assist with meals as needed  - Monitor I&O, WT and lab values  - Obtain nutritional consult as needed  - Optimize oral hygiene and moisture  - Encourage food from home; allow for food preferences  - Enhance eating environment  Outcome: Progressing     Problem: RISK FOR INFECTION - ADULT  Goal: Absence of fever/infection during anticipated neutropenic period  Description: INTERVENTIONS  - Monitor WBC  - Administer growth factors as ordered  - Implement neutropenic guidelines  Outcome: Progressing

## 2023-09-01 ENCOUNTER — ANTI-COAG VISIT (OUTPATIENT)
Dept: HEMATOLOGY/ONCOLOGY | Facility: HOSPITAL | Age: 53
End: 2023-09-01

## 2023-09-01 DIAGNOSIS — D68.61 ANTIPHOSPHOLIPID ANTIBODY SYNDROME (HCC): Primary | ICD-10-CM

## 2023-09-07 ENCOUNTER — ANTI-COAG VISIT (OUTPATIENT)
Dept: HEMATOLOGY/ONCOLOGY | Facility: HOSPITAL | Age: 53
End: 2023-09-07

## 2023-09-07 DIAGNOSIS — D68.61 ANTIPHOSPHOLIPID ANTIBODY SYNDROME (HCC): Primary | ICD-10-CM

## 2023-09-07 LAB — INR: 5.2 (ref 0.8–1.2)

## 2023-09-08 ENCOUNTER — APPOINTMENT (OUTPATIENT)
Dept: HEMATOLOGY/ONCOLOGY | Facility: HOSPITAL | Age: 53
End: 2023-09-08
Attending: INTERNAL MEDICINE
Payer: COMMERCIAL

## 2023-09-08 ENCOUNTER — TELEPHONE (OUTPATIENT)
Dept: HEMATOLOGY/ONCOLOGY | Facility: HOSPITAL | Age: 53
End: 2023-09-08

## 2023-09-08 ENCOUNTER — ANTI-COAG VISIT (OUTPATIENT)
Dept: HEMATOLOGY/ONCOLOGY | Facility: HOSPITAL | Age: 53
End: 2023-09-08

## 2023-09-08 DIAGNOSIS — D68.61 ANTIPHOSPHOLIPID ANTIBODY SYNDROME (HCC): Primary | ICD-10-CM

## 2023-09-08 LAB — INR: 3.1 (ref 0.8–1.2)

## 2023-09-08 NOTE — TELEPHONE ENCOUNTER
Denita calling she was in hospital for c diff she is still having loose bowel moments, headaches shaky ,weak she is trying to stay hydrated.  Her INR was 3.1 on 9/8 on 9/7 it was 5.2. she was told by coby to lower dosage of coumadin. lauren

## 2023-09-08 NOTE — TELEPHONE ENCOUNTER
I made a second attempt to reach Denita. I left a second voice mail message asking her to please call the office so I may do a telephone assessment.

## 2023-09-08 NOTE — TELEPHONE ENCOUNTER
Dr Js Pena patient - antiphospholipid antibody syndrome. Patient hositalized 8/28/2023 - 8/30/2023 with C Diff colitis. I attempted to reach Denita to do a telephone assessment. I left a voice mail message asking her to please return my call.

## 2023-09-19 ENCOUNTER — TELEPHONE (OUTPATIENT)
Dept: HEMATOLOGY/ONCOLOGY | Facility: HOSPITAL | Age: 53
End: 2023-09-19

## 2023-09-19 NOTE — TELEPHONE ENCOUNTER
Dr. Edith Mendieta at [phone] is calling to find out what Coagulation medication would Dr. Alexi Briggs want the patient on after surgery. Please call the Dr's. Office to discuss this matter.

## 2023-09-19 NOTE — TELEPHONE ENCOUNTER
Left message with patient's PCP office requesting call back to further discuss. Contact information provided.

## 2023-10-02 ENCOUNTER — ANTI-COAG VISIT (OUTPATIENT)
Dept: HEMATOLOGY/ONCOLOGY | Facility: HOSPITAL | Age: 53
End: 2023-10-02

## 2023-10-02 DIAGNOSIS — D68.61 ANTIPHOSPHOLIPID ANTIBODY SYNDROME (HCC): Primary | ICD-10-CM

## 2023-10-02 LAB — INR: 2 (ref 0.8–1.2)

## 2023-10-10 ENCOUNTER — ANTI-COAG VISIT (OUTPATIENT)
Dept: HEMATOLOGY/ONCOLOGY | Facility: HOSPITAL | Age: 53
End: 2023-10-10

## 2023-10-10 DIAGNOSIS — D68.61 ANTIPHOSPHOLIPID ANTIBODY SYNDROME (HCC): Primary | ICD-10-CM

## 2023-10-10 LAB — INR: 2.4 (ref 0.8–1.2)

## 2023-10-12 RX ORDER — ENOXAPARIN SODIUM 100 MG/ML
40 INJECTION SUBCUTANEOUS DAILY
Qty: 10 EACH | Refills: 0 | Status: SHIPPED | OUTPATIENT
Start: 2023-10-12

## 2023-11-15 ENCOUNTER — ANTI-COAG VISIT (OUTPATIENT)
Dept: HEMATOLOGY/ONCOLOGY | Facility: HOSPITAL | Age: 53
End: 2023-11-15

## 2023-11-15 DIAGNOSIS — D68.61 ANTIPHOSPHOLIPID ANTIBODY SYNDROME (HCC): ICD-10-CM

## 2023-11-15 DIAGNOSIS — D68.61 ANTIPHOSPHOLIPID ANTIBODY SYNDROME (HCC): Primary | ICD-10-CM

## 2023-11-15 LAB — INR: 2.9 (ref 0.8–1.2)

## 2023-11-15 RX ORDER — WARFARIN SODIUM 10 MG/1
TABLET ORAL
Qty: 30 TABLET | Refills: 0 | Status: SHIPPED | OUTPATIENT
Start: 2023-11-15

## 2023-12-06 ENCOUNTER — ANTI-COAG VISIT (OUTPATIENT)
Dept: HEMATOLOGY/ONCOLOGY | Facility: HOSPITAL | Age: 53
End: 2023-12-06

## 2023-12-06 DIAGNOSIS — D68.61 ANTIPHOSPHOLIPID ANTIBODY SYNDROME (HCC): Primary | ICD-10-CM

## 2023-12-06 LAB — INR: 1.9 (ref 0.8–1.2)

## 2023-12-19 ENCOUNTER — HOSPITAL ENCOUNTER (EMERGENCY)
Age: 53
Discharge: HOME | End: 2023-12-20
Attending: EMERGENCY MEDICINE
Payer: COMMERCIAL

## 2023-12-19 DIAGNOSIS — L03.211 FACIAL CELLULITIS: Primary | ICD-10-CM

## 2023-12-19 LAB
BASOPHILS # BLD AUTO: 0.02 X10(3) UL (ref 0–0.2)
BASOPHILS NFR BLD AUTO: 0.3 %
EOSINOPHIL # BLD AUTO: 0.11 X10(3) UL (ref 0–0.7)
EOSINOPHIL NFR BLD AUTO: 1.6 %
ERYTHROCYTE [DISTWIDTH] IN BLOOD BY AUTOMATED COUNT: 13.6 %
HCT VFR BLD AUTO: 42.5 %
HGB BLD-MCNC: 13.7 G/DL
IMM GRANULOCYTES # BLD AUTO: 0.02 X10(3) UL (ref 0–1)
IMM GRANULOCYTES NFR BLD: 0.3 %
LYMPHOCYTES # BLD AUTO: 1.97 X10(3) UL (ref 1–4)
LYMPHOCYTES NFR BLD AUTO: 29.4 %
MCH RBC QN AUTO: 28.8 PG (ref 26–34)
MCHC RBC AUTO-ENTMCNC: 32.2 G/DL (ref 31–37)
MCV RBC AUTO: 89.5 FL
MONOCYTES # BLD AUTO: 0.52 X10(3) UL (ref 0.1–1)
MONOCYTES NFR BLD AUTO: 7.7 %
NEUTROPHILS # BLD AUTO: 4.07 X10 (3) UL (ref 1.5–7.7)
NEUTROPHILS # BLD AUTO: 4.07 X10(3) UL (ref 1.5–7.7)
NEUTROPHILS NFR BLD AUTO: 60.7 %
PLATELET # BLD AUTO: 211 10(3)UL (ref 150–450)
RBC # BLD AUTO: 4.75 X10(6)UL
WBC # BLD AUTO: 6.7 X10(3) UL (ref 4–11)

## 2023-12-19 PROCEDURE — 80053 COMPREHEN METABOLIC PANEL: CPT | Performed by: EMERGENCY MEDICINE

## 2023-12-19 PROCEDURE — 85025 COMPLETE CBC W/AUTO DIFF WBC: CPT | Performed by: EMERGENCY MEDICINE

## 2023-12-19 PROCEDURE — 99284 EMERGENCY DEPT VISIT MOD MDM: CPT

## 2023-12-19 PROCEDURE — 96360 HYDRATION IV INFUSION INIT: CPT

## 2023-12-20 ENCOUNTER — APPOINTMENT (OUTPATIENT)
Dept: CT IMAGING | Age: 53
End: 2023-12-20
Attending: EMERGENCY MEDICINE
Payer: COMMERCIAL

## 2023-12-20 VITALS
TEMPERATURE: 98 F | HEIGHT: 62 IN | OXYGEN SATURATION: 97 % | DIASTOLIC BLOOD PRESSURE: 96 MMHG | RESPIRATION RATE: 16 BRPM | BODY MASS INDEX: 38.64 KG/M2 | HEART RATE: 75 BPM | WEIGHT: 210 LBS | SYSTOLIC BLOOD PRESSURE: 150 MMHG

## 2023-12-20 LAB
ALBUMIN SERPL-MCNC: 3.5 G/DL (ref 3.4–5)
ALBUMIN/GLOB SERPL: 1.1 {RATIO} (ref 1–2)
ALP LIVER SERPL-CCNC: 80 U/L
ALT SERPL-CCNC: 15 U/L
ANION GAP SERPL CALC-SCNC: 6 MMOL/L (ref 0–18)
AST SERPL-CCNC: 18 U/L (ref 15–37)
BILIRUB SERPL-MCNC: 0.3 MG/DL (ref 0.1–2)
BUN BLD-MCNC: 13 MG/DL (ref 9–23)
CALCIUM BLD-MCNC: 8.8 MG/DL (ref 8.5–10.1)
CHLORIDE SERPL-SCNC: 108 MMOL/L (ref 98–112)
CO2 SERPL-SCNC: 28 MMOL/L (ref 21–32)
CREAT BLD-MCNC: 0.83 MG/DL
EGFRCR SERPLBLD CKD-EPI 2021: 85 ML/MIN/1.73M2 (ref 60–?)
GLOBULIN PLAS-MCNC: 3.2 G/DL (ref 2.8–4.4)
GLUCOSE BLD-MCNC: 98 MG/DL (ref 70–99)
OSMOLALITY SERPL CALC.SUM OF ELEC: 294 MOSM/KG (ref 275–295)
POTASSIUM SERPL-SCNC: 3.7 MMOL/L (ref 3.5–5.1)
PROT SERPL-MCNC: 6.7 G/DL (ref 6.4–8.2)
SODIUM SERPL-SCNC: 142 MMOL/L (ref 136–145)

## 2023-12-20 PROCEDURE — 70487 CT MAXILLOFACIAL W/DYE: CPT | Performed by: EMERGENCY MEDICINE

## 2023-12-20 RX ORDER — LEVOFLOXACIN 750 MG/1
750 TABLET, FILM COATED ORAL DAILY
Qty: 10 TABLET | Refills: 0 | Status: SHIPPED | OUTPATIENT
Start: 2023-12-20 | End: 2023-12-30

## 2023-12-20 RX ORDER — IOHEXOL 350 MG/ML
75 INJECTION, SOLUTION INTRAVENOUS
Status: COMPLETED | OUTPATIENT
Start: 2023-12-20 | End: 2023-12-20

## 2023-12-20 RX ORDER — METRONIDAZOLE 500 MG/1
500 TABLET ORAL 3 TIMES DAILY
Qty: 30 TABLET | Refills: 0 | Status: SHIPPED | OUTPATIENT
Start: 2023-12-20 | End: 2023-12-30

## 2023-12-20 NOTE — ED INITIAL ASSESSMENT (HPI)
Patient here with area of swelling to left lower face over the past week. Denies fevers. Denies rash.

## 2024-01-03 ENCOUNTER — ANTI-COAG VISIT (OUTPATIENT)
Dept: HEMATOLOGY/ONCOLOGY | Facility: HOSPITAL | Age: 54
End: 2024-01-03

## 2024-01-03 DIAGNOSIS — D68.61 ANTIPHOSPHOLIPID ANTIBODY SYNDROME (HCC): Primary | ICD-10-CM

## 2024-01-03 LAB — INR: 2.8 (ref 0.8–1.2)

## 2024-01-15 ENCOUNTER — ANTI-COAG VISIT (OUTPATIENT)
Dept: HEMATOLOGY/ONCOLOGY | Facility: HOSPITAL | Age: 54
End: 2024-01-15

## 2024-01-15 DIAGNOSIS — D68.61 ANTIPHOSPHOLIPID ANTIBODY SYNDROME (HCC): Primary | ICD-10-CM

## 2024-01-15 LAB — INR: 2.5 (ref 0.8–1.2)

## 2024-01-23 ENCOUNTER — ANTI-COAG VISIT (OUTPATIENT)
Dept: HEMATOLOGY/ONCOLOGY | Facility: HOSPITAL | Age: 54
End: 2024-01-23

## 2024-01-23 DIAGNOSIS — D68.61 ANTIPHOSPHOLIPID ANTIBODY SYNDROME (HCC): Primary | ICD-10-CM

## 2024-01-23 LAB — INR: 2.4 (ref 0.8–1.2)

## 2024-01-23 RX ORDER — ENOXAPARIN SODIUM 100 MG/ML
40 INJECTION SUBCUTANEOUS DAILY
Qty: 10 EACH | Refills: 0 | Status: SHIPPED | OUTPATIENT
Start: 2024-01-23

## 2024-02-05 ENCOUNTER — ANTI-COAG VISIT (OUTPATIENT)
Dept: HEMATOLOGY/ONCOLOGY | Facility: HOSPITAL | Age: 54
End: 2024-02-05

## 2024-02-05 DIAGNOSIS — D68.61 ANTIPHOSPHOLIPID ANTIBODY SYNDROME (HCC): Primary | ICD-10-CM

## 2024-02-05 LAB — INR: 3 (ref 0.8–1.2)

## 2024-02-26 ENCOUNTER — ANTI-COAG VISIT (OUTPATIENT)
Dept: HEMATOLOGY/ONCOLOGY | Facility: HOSPITAL | Age: 54
End: 2024-02-26

## 2024-02-26 DIAGNOSIS — D68.61 ANTIPHOSPHOLIPID ANTIBODY SYNDROME (HCC): Primary | ICD-10-CM

## 2024-02-26 LAB — INR: 2.4 (ref 0.8–1.2)

## 2024-03-04 ENCOUNTER — HOSPITAL ENCOUNTER (EMERGENCY)
Facility: HOSPITAL | Age: 54
Discharge: HOME | End: 2024-03-04
Attending: EMERGENCY MEDICINE
Payer: COMMERCIAL

## 2024-03-04 ENCOUNTER — APPOINTMENT (OUTPATIENT)
Dept: CT IMAGING | Facility: HOSPITAL | Age: 54
End: 2024-03-04
Attending: EMERGENCY MEDICINE
Payer: COMMERCIAL

## 2024-03-04 VITALS
HEIGHT: 62 IN | OXYGEN SATURATION: 100 % | DIASTOLIC BLOOD PRESSURE: 97 MMHG | BODY MASS INDEX: 39.56 KG/M2 | WEIGHT: 215 LBS | HEART RATE: 67 BPM | RESPIRATION RATE: 18 BRPM | TEMPERATURE: 98 F | SYSTOLIC BLOOD PRESSURE: 164 MMHG

## 2024-03-04 DIAGNOSIS — N30.00 ACUTE CYSTITIS WITHOUT HEMATURIA: ICD-10-CM

## 2024-03-04 DIAGNOSIS — M54.50 ACUTE BILATERAL LOW BACK PAIN WITHOUT SCIATICA: Primary | ICD-10-CM

## 2024-03-04 LAB
ALBUMIN SERPL-MCNC: 3.4 G/DL (ref 3.4–5)
ALBUMIN/GLOB SERPL: 1.1 {RATIO} (ref 1–2)
ALP LIVER SERPL-CCNC: 81 U/L
ALT SERPL-CCNC: 13 U/L
ANION GAP SERPL CALC-SCNC: 3 MMOL/L (ref 0–18)
AST SERPL-CCNC: 19 U/L (ref 15–37)
BASOPHILS # BLD AUTO: 0.02 X10(3) UL (ref 0–0.2)
BASOPHILS NFR BLD AUTO: 0.4 %
BILIRUB SERPL-MCNC: 0.5 MG/DL (ref 0.1–2)
BILIRUB UR QL STRIP.AUTO: NEGATIVE
BUN BLD-MCNC: 13 MG/DL (ref 9–23)
CALCIUM BLD-MCNC: 9.3 MG/DL (ref 8.5–10.1)
CHLORIDE SERPL-SCNC: 114 MMOL/L (ref 98–112)
CLARITY UR REFRACT.AUTO: CLEAR
CO2 SERPL-SCNC: 24 MMOL/L (ref 21–32)
COLOR UR AUTO: YELLOW
CREAT BLD-MCNC: 0.72 MG/DL
EGFRCR SERPLBLD CKD-EPI 2021: 101 ML/MIN/1.73M2 (ref 60–?)
EOSINOPHIL # BLD AUTO: 0.14 X10(3) UL (ref 0–0.7)
EOSINOPHIL NFR BLD AUTO: 2.6 %
ERYTHROCYTE [DISTWIDTH] IN BLOOD BY AUTOMATED COUNT: 12.9 %
GLOBULIN PLAS-MCNC: 3 G/DL (ref 2.8–4.4)
GLUCOSE BLD-MCNC: 93 MG/DL (ref 70–99)
GLUCOSE UR STRIP.AUTO-MCNC: NORMAL MG/DL
HCT VFR BLD AUTO: 39.8 %
HGB BLD-MCNC: 13.3 G/DL
IMM GRANULOCYTES # BLD AUTO: 0.01 X10(3) UL (ref 0–1)
IMM GRANULOCYTES NFR BLD: 0.2 %
KETONES UR STRIP.AUTO-MCNC: NEGATIVE MG/DL
LEUKOCYTE ESTERASE UR QL STRIP.AUTO: 500
LYMPHOCYTES # BLD AUTO: 2.02 X10(3) UL (ref 1–4)
LYMPHOCYTES NFR BLD AUTO: 37 %
MCH RBC QN AUTO: 28.5 PG (ref 26–34)
MCHC RBC AUTO-ENTMCNC: 33.4 G/DL (ref 31–37)
MCV RBC AUTO: 85.4 FL
MONOCYTES # BLD AUTO: 0.47 X10(3) UL (ref 0.1–1)
MONOCYTES NFR BLD AUTO: 8.6 %
NEUTROPHILS # BLD AUTO: 2.8 X10 (3) UL (ref 1.5–7.7)
NEUTROPHILS # BLD AUTO: 2.8 X10(3) UL (ref 1.5–7.7)
NEUTROPHILS NFR BLD AUTO: 51.2 %
NITRITE UR QL STRIP.AUTO: NEGATIVE
OSMOLALITY SERPL CALC.SUM OF ELEC: 292 MOSM/KG (ref 275–295)
PH UR STRIP.AUTO: 5 [PH] (ref 5–8)
PLATELET # BLD AUTO: 218 10(3)UL (ref 150–450)
POTASSIUM SERPL-SCNC: 3.9 MMOL/L (ref 3.5–5.1)
PROT SERPL-MCNC: 6.4 G/DL (ref 6.4–8.2)
PROT UR STRIP.AUTO-MCNC: NEGATIVE MG/DL
RBC # BLD AUTO: 4.66 X10(6)UL
RBC UR QL AUTO: NEGATIVE
SODIUM SERPL-SCNC: 141 MMOL/L (ref 136–145)
SP GR UR STRIP.AUTO: 1.02 (ref 1–1.03)
UROBILINOGEN UR STRIP.AUTO-MCNC: NORMAL MG/DL
WBC # BLD AUTO: 5.5 X10(3) UL (ref 4–11)
WBC #/AREA URNS AUTO: >50 /HPF

## 2024-03-04 PROCEDURE — 96361 HYDRATE IV INFUSION ADD-ON: CPT

## 2024-03-04 PROCEDURE — 85025 COMPLETE CBC W/AUTO DIFF WBC: CPT | Performed by: EMERGENCY MEDICINE

## 2024-03-04 PROCEDURE — 99284 EMERGENCY DEPT VISIT MOD MDM: CPT

## 2024-03-04 PROCEDURE — 81001 URINALYSIS AUTO W/SCOPE: CPT | Performed by: EMERGENCY MEDICINE

## 2024-03-04 PROCEDURE — 99285 EMERGENCY DEPT VISIT HI MDM: CPT

## 2024-03-04 PROCEDURE — 96374 THER/PROPH/DIAG INJ IV PUSH: CPT

## 2024-03-04 PROCEDURE — 74176 CT ABD & PELVIS W/O CONTRAST: CPT | Performed by: EMERGENCY MEDICINE

## 2024-03-04 PROCEDURE — 87086 URINE CULTURE/COLONY COUNT: CPT | Performed by: EMERGENCY MEDICINE

## 2024-03-04 PROCEDURE — 96375 TX/PRO/DX INJ NEW DRUG ADDON: CPT

## 2024-03-04 PROCEDURE — 80053 COMPREHEN METABOLIC PANEL: CPT | Performed by: EMERGENCY MEDICINE

## 2024-03-04 RX ORDER — KETOROLAC TROMETHAMINE 15 MG/ML
15 INJECTION, SOLUTION INTRAMUSCULAR; INTRAVENOUS ONCE
Status: COMPLETED | OUTPATIENT
Start: 2024-03-04 | End: 2024-03-04

## 2024-03-04 RX ORDER — MORPHINE SULFATE 4 MG/ML
4 INJECTION, SOLUTION INTRAMUSCULAR; INTRAVENOUS EVERY 30 MIN PRN
Status: DISCONTINUED | OUTPATIENT
Start: 2024-03-04 | End: 2024-03-05

## 2024-03-04 RX ORDER — SODIUM CHLORIDE 9 MG/ML
125 INJECTION, SOLUTION INTRAVENOUS CONTINUOUS
Status: DISCONTINUED | OUTPATIENT
Start: 2024-03-04 | End: 2024-03-05

## 2024-03-04 RX ORDER — NITROFURANTOIN 25; 75 MG/1; MG/1
100 CAPSULE ORAL ONCE
Status: COMPLETED | OUTPATIENT
Start: 2024-03-04 | End: 2024-03-04

## 2024-03-04 RX ORDER — ONDANSETRON 4 MG/1
4 TABLET, ORALLY DISINTEGRATING ORAL EVERY 4 HOURS PRN
Qty: 10 TABLET | Refills: 0 | Status: SHIPPED | OUTPATIENT
Start: 2024-03-04 | End: 2024-03-11

## 2024-03-04 RX ORDER — NITROFURANTOIN 25; 75 MG/1; MG/1
100 CAPSULE ORAL 2 TIMES DAILY
Qty: 20 CAPSULE | Refills: 0 | Status: SHIPPED | OUTPATIENT
Start: 2024-03-04 | End: 2024-03-14

## 2024-03-05 NOTE — ED PROVIDER NOTES
Patient Seen in: [facility] Emergency Department      History     Chief Complaint   Patient presents with    Back Pain     Stated Complaint: lower back pains since yesterday, worsening.    Subjective:   HPI    Pleasant 53-year-old female present with back pain.  She is actually had back pain for the last several weeks all across the lower part of the back part over the last several days it is getting much worse and is going to the right flank into the lower abdominal region she also notices an increase in urination and discomfort in urination she denies vaginal discharge fevers vomiting any other exacerbating relieving factors or associated symptoms.    Objective:   Past Medical History:   Diagnosis Date    DVT, recurrent, lower extremity, acute, right (HCC)     Fatty liver     Fibromyalgia     Hyperlipidemia     Hypertension     Hypothyroidism     Lupus (HCC)     Migraine     Pericarditis (HCC)     Rheumatoid arthritis (HCC)     Sicca syndrome (HCC)               Past Surgical History:   Procedure Laterality Date    COLONOSCOPY N/A 7/4/2023    Procedure: COLONOSCOPY with cold snare polypectomy;  Surgeon: Hayde Chua MD;  Location:  ENDOSCOPY    HYSTERECTOMY      KNEE ARTHROSCOPY      KNEE REPLACEMENT SURGERY                  Social History     Socioeconomic History    Marital status:    Tobacco Use    Smoking status: Never    Smokeless tobacco: Never   Vaping Use    Vaping Use: Never used   Substance and Sexual Activity    Alcohol use: Never    Drug use: Never    Sexual activity: Yes     Partners: Male     Birth control/protection: Hysterectomy   Other Topics Concern    Caffeine Concern Yes    Exercise Yes    Seat Belt Yes              Review of Systems    Positive for stated complaint: lower back pains since yesterday, worsening.  Other systems are as noted in HPI.  Constitutional and vital signs reviewed.      All other systems reviewed and negative except as noted above.    Physical  Exam     ED Triage Vitals [03/04/24 1836]   BP (!) 153/92   Pulse 86   Resp 18   Temp 97.6 °F (36.4 °C)   Temp src Temporal   SpO2 97 %   O2 Device None (Room air)       Current:BP (!) 159/105   Pulse 69   Temp 97.6 °F (36.4 °C) (Temporal)   Resp 18   Ht 157.5 cm (5' 2\")   Wt 97.5 kg   SpO2 100%   BMI 39.32 kg/m²         Physical Exam  Generally the patient is alert and oriented ×3 and appears in no distress  HEENT exam: Normal  Lungs are clear to auscultation bilaterally with no wheezes retractions or rhonchi noted  Cardiovascular exam shows a regular rate and rhythm  Abdomen soft nontender with no rebound tenderness noted  Extremity exam shows no clubbing cyanosis or edema  Skin exam shows no rashes or lacerations  Neuro exam shows no focal deficits  Back exam shows no tenderness       ED Course     Labs Reviewed   COMP METABOLIC PANEL (14) - Abnormal; Notable for the following components:       Result Value    Chloride 114 (*)     All other components within normal limits   URINALYSIS WITH CULTURE REFLEX - Abnormal; Notable for the following components:    Leukocyte Esterase Urine 500 (*)     WBC Urine >50 (*)     RBC Urine 6-10 (*)     Bacteria Urine Rare (*)     Squamous Epi. Cells Few (*)     All other components within normal limits   CBC WITH DIFFERENTIAL WITH PLATELET    Narrative:     The following orders were created for panel order CBC With Differential With Platelet.  Procedure                               Abnormality         Status                     ---------                               -----------         ------                     CBC W/ DIFFERENTIAL[[id number]]                              Final result                 Please view results for these tests on the individual orders.   URINE CULTURE, ROUTINE   CBC W/ DIFFERENTIAL             Differential diagnosis includes sepsis, pyelonephritis         MDM                                         Medical Decision Making  53-year-old female  presenting with back pain.  IV established cardiac monitor shows a sinus rhythm pulse ox shows no signs of hypoxia CBC shows a stable hemoglobin level metabolic panel stable renal function.  Urinalysis possible UTI but cultures will be sent.  The patient has no independent interpretation by ED physician of CT scan shows no signs of pyelonephritis sepsis no signs of appendicitis or obstruction patient will be discharged home placed on Macrobid as an outpatient pending urine culture results and is to return emerged part worsening symptoms with complaints  The patient was screened and evaluated during this visit.  As a treating physician attending to the patient, I determined, within reasonable clinical confidence and prior to discharge, that an emergency medical condition was not or was no longer present.  There was no indication for further evaluation, treatment or admission on an emergency basis.    The usual and customary discharge instructions were discussed given the patient's ER course.  We discussed signs and symptoms that should prompt the patient's immediate return to the emergency department.  Reasonable over-the-counter and prescription treatment options and physician follow-up plan was discussed.  Patient was discharged home in good condition  This note was prepared using Dragon Medical voice recognition dictation software.  As a result errors may occur.  When identified to these areas have been corrected.  While every attempt is made to correct errors during dictation discrepancies may still exist.  Please contact if there are any errors    Problems Addressed:  Acute bilateral low back pain without sciatica: acute illness or injury  Acute cystitis without hematuria: acute illness or injury    Amount and/or Complexity of Data Reviewed  Labs: ordered. Decision-making details documented in ED Course.  Radiology: ordered and independent interpretation performed. Decision-making details documented in ED  Course.  ECG/medicine tests: ordered and independent interpretation performed. Decision-making details documented in ED Course.        Disposition and Plan     Clinical Impression:  1. Acute bilateral low back pain without sciatica    2. Acute cystitis without hematuria         Disposition:  Discharge  3/4/2024 10:33 pm    Follow-up:  No follow-up provider specified.        Medications Prescribed:  Current Discharge Medication List

## 2024-03-05 NOTE — ED INITIAL ASSESSMENT (HPI)
Pt to ED in wheelchair c/o mid to lower back pain starting yesterday, worsening today, denies radiation, denies loss of bladder/bowel control, taking Norco at home for pain but states it is not helping

## 2024-03-08 ENCOUNTER — ANTI-COAG VISIT (OUTPATIENT)
Dept: HEMATOLOGY/ONCOLOGY | Facility: HOSPITAL | Age: 54
End: 2024-03-08

## 2024-03-08 DIAGNOSIS — D68.61 ANTIPHOSPHOLIPID ANTIBODY SYNDROME (HCC): Primary | ICD-10-CM

## 2024-03-08 LAB — INR: 2.2 (ref 2.5–3.5)

## 2024-03-25 ENCOUNTER — ANTI-COAG VISIT (OUTPATIENT)
Dept: HEMATOLOGY/ONCOLOGY | Facility: HOSPITAL | Age: 54
End: 2024-03-25

## 2024-03-25 DIAGNOSIS — D68.61 ANTIPHOSPHOLIPID ANTIBODY SYNDROME (HCC): Primary | ICD-10-CM

## 2024-03-25 LAB — INR: 2.2 (ref 0.8–1.2)

## 2024-04-17 ENCOUNTER — ANTI-COAG VISIT (OUTPATIENT)
Dept: HEMATOLOGY/ONCOLOGY | Facility: HOSPITAL | Age: 54
End: 2024-04-17

## 2024-04-17 DIAGNOSIS — D68.61 ANTIPHOSPHOLIPID ANTIBODY SYNDROME (HCC): Primary | ICD-10-CM

## 2024-04-17 LAB — INR: 1.2 (ref 0.8–1.2)

## 2024-04-18 ENCOUNTER — ANTI-COAG VISIT (OUTPATIENT)
Dept: HEMATOLOGY/ONCOLOGY | Facility: HOSPITAL | Age: 54
End: 2024-04-18

## 2024-04-18 ENCOUNTER — OFFICE VISIT (OUTPATIENT)
Dept: HEMATOLOGY/ONCOLOGY | Facility: HOSPITAL | Age: 54
End: 2024-04-18
Attending: INTERNAL MEDICINE
Payer: COMMERCIAL

## 2024-04-18 VITALS
DIASTOLIC BLOOD PRESSURE: 81 MMHG | TEMPERATURE: 97 F | WEIGHT: 224 LBS | HEART RATE: 103 BPM | BODY MASS INDEX: 40.7 KG/M2 | OXYGEN SATURATION: 96 % | SYSTOLIC BLOOD PRESSURE: 131 MMHG | HEIGHT: 62.01 IN

## 2024-04-18 DIAGNOSIS — D68.61 ANTIPHOSPHOLIPID ANTIBODY SYNDROME (HCC): Primary | ICD-10-CM

## 2024-04-18 LAB
INR BLD: 1.08 (ref 0.8–1.2)
PROTHROMBIN TIME: 14.1 SECONDS (ref 11.6–14.8)

## 2024-04-18 PROCEDURE — 99214 OFFICE O/P EST MOD 30 MIN: CPT | Performed by: INTERNAL MEDICINE

## 2024-04-18 RX ORDER — WARFARIN SODIUM 10 MG/1
10 TABLET ORAL AS DIRECTED
Qty: 30 TABLET | Refills: 0 | Status: SHIPPED | OUTPATIENT
Start: 2024-04-18

## 2024-04-18 NOTE — PROGRESS NOTES
Patient here for follow-up. States she has a hard time being compliant with coumadin. States she fell last month and is having back pain again. Denies any upcoming planned surgeries or procedures.

## 2024-04-18 NOTE — PROGRESS NOTES
[facility] Note    Diagnosis:   Recurrent DVT  Possible APLS    Treatment History:   Coumadin     Visit Diagnosis:  1. Antiphospholipid antibody syndrome (HCC)      History of Present Illness: 53F with a PMH of SLE, RA, Osteoporosis, hypothyroidism, obesity, JEFF and recurrent arterial/venous thrombosis here to discuss history of recurrent VTE, APLS and JEFF. She has been on coumadin since around 2019.     -She recently moved from [place] and was following with [facility] in the [place] area. She has been maintained on coumadin. In regards to her RA, per notes it appears that she was in remission after receiving rituximab. Per patient, she was diagnosed with APLS in [place] and received Rituxan for possible RA/Lupus overlap syndrome.     -Blood Clot History per patient:    - 2015: She had a R TKA. She was diagnosed with a blood clot in RLE vein afterwards. She states that she was treated with plavix?.     - 2017: Another blood in the RLE. She had an arterial and venous clot. She states that she ws placed on Plavix, she is not sure about coumadin.     - 2019:  She met with her hematologist in [place] who tested her for APLS and she was placed on coumadin with an INR goal of 2-3 for \"triple positive\" APLS. She received Rituxan in 09/2019.   - She is alternating her coumadin 2 mg and 3 mg every other day. She checks her INR on Fridays and last was 1.9.     -She states that her hematologist in [place] was managing her RA. She was previously on Benlysta which she did not tolerate. He put her on prednisone and q6 month Rituxan.     Interval History  -Labs from March 2024 reviewed. Normal CBC  -INR recently was normal. However noted to have some non-compliance   -Denies any bleeding  -No falls     Review of Systems: 12 Point ROS was completed and pertinent positives are in the HPI    Current Outpatient Medications on File Prior to Visit   Medication Sig Dispense Refill    warfarin (JANTOVEN) 10 MG  Oral Tab Take 10 mg on tuesdays and thursdays only. Take 7.5 mg all other nights. 30 tablet 0    acidophilus-pectin Oral Cap Take 1 capsule by mouth daily. 14 capsule 0    levothyroxine 88 MCG Oral Tab Take 1 tablet (88 mcg total) by mouth before breakfast.      warfarin 2.5 MG Oral Tab Take 7.5 mg (3 tablets) on Sunday,Monday,Wednesday,Friday, and Saturdays 90 tablet 2    warfarin 10 MG Oral Tab Take 1 tablet (10 mg total) by mouth As Directed. Tu, Th      gabapentin 100 MG Oral Cap Take 3 capsules (300 mg total) by mouth in the morning, at noon, and at bedtime.      raNITIdine HCl 150 MG Oral Cap Take by mouth.      Potassium Chloride ER 10 MEQ Oral Tab CR Take 1 tablet (10 mEq total) by mouth 2 (two) times daily with meals.      pantoprazole 20 MG Oral Tab EC Take 1 tablet (20 mg total) by mouth daily.      montelukast 10 MG Oral Tab Take 1 tablet (10 mg total) by mouth daily.      Ferrous Sulfate 325 (65 Fe) MG Oral Tab Take by mouth.      Budesonide-Formoterol Fumarate 80-4.5 MCG/ACT Inhalation Aerosol Inhale into the lungs.      ascorbic acid 100 MG Oral Tab Take by mouth.      Respiratory Therapy Supplies (ADULT AEROSOL MASK) Does not apply Misc Adult Aerosol Mask   USE AS DIRECTED      Respiratory Therapy Supplies (JET-AIR REUSABLE NEBULIZER SYS) Does not apply Kit Reusable Nebulizer Kit   USE EVERY 4 HOURS AS NEEDED      PREDNISONE 10 MG Oral Tab Take 1 tablet (10 mg total) by mouth daily. 90 tablet 0    hydroxychloroquine 200 MG Oral Tab Take 1 tablet (200 mg total) by mouth 2 (two) times daily. 180 tablet 1    Albuterol Sulfate  (90 Base) MCG/ACT Inhalation Aero Soln albuterol sulfate HFA 90 mcg/actuation aerosol inhaler      folic acid 1 MG Oral Tab Take 1 tablet (1 mg total) by mouth daily. 90 tablet 0    topiramate 100 MG Oral Tab Take by mouth 2 (two) times daily.      atorvastatin 10 MG Oral Tab Take 1 tablet (10 mg total) by mouth nightly.      ALPRAZolam 0.25 MG Oral Tab Take 1 tablet (0.25  mg total) by mouth nightly as needed.      enoxaparin 40 MG/0.4ML Injection Solution Prefilled Syringe Inject 0.4 mL (40 mg total) into the skin daily. (Patient not taking: Reported on 4/18/2024) 10 each 0    ondansetron 4 MG Oral Tablet Dispersible 1 tablet (4 mg total) every 4 (four) hours as needed. (Patient not taking: Reported on 4/18/2024)      Butalbital-APAP-Caffeine -40 MG Oral Tab Take 1 tablet by mouth every 4 (four) hours as needed for Pain. (Patient not taking: Reported on 4/18/2024)      fluconazole 100 MG Oral Tab Take 1 tablet (100 mg total) by mouth as needed (mouth sores as needed). (Patient not taking: Reported on 4/18/2024)       No current facility-administered medications on file prior to visit.     Past Medical History:    DVT, recurrent, lower extremity, acute, right (HCC)    Fatty liver    Fibromyalgia    Hyperlipidemia    Hypertension    Hypothyroidism    Lupus (HCC)    Migraine    Pericarditis (HCC)    Rheumatoid arthritis (HCC)    Sicca syndrome (HCC)     Past Surgical History:   Procedure Laterality Date    Colonoscopy N/A 7/4/2023    Procedure: COLONOSCOPY with cold snare polypectomy;  Surgeon: Hayde Chua MD;  Location:  ENDOSCOPY    Hysterectomy      Knee arthroscopy      Knee replacement surgery       Social History     Socioeconomic History    Marital status:    Tobacco Use    Smoking status: Never    Smokeless tobacco: Never   Vaping Use    Vaping status: Never Used   Substance and Sexual Activity    Alcohol use: Never    Drug use: Never    Sexual activity: Yes     Partners: Male     Birth control/protection: Hysterectomy      Family History   Problem Relation Age of Onset    Breast Cancer Maternal Aunt 45    Breast Cancer Paternal Aunt 60    Ovarian Cancer Paternal Aunt 68    Cancer Father     Cancer Mother     Other (cerebral palsey) Daughter     Other (lupus) Brother        Physical Exam  Height: 157.5 cm (5' 2.01\") (04/18 1412)  Weight: 101.6 kg (224  lb) (04/18 1412)  BSA (Calculated - sq m): 2.01 sq meters (04/18 1412)  Pulse: 103 (04/18 1412)  BP: 131/81 (04/18 1412)  Temp: 97.3 °F (36.3 °C) (04/18 1412)  Do Not Use - Resp Rate: --  SpO2: 96 % (04/18 1412)     General: NAD, AOX3  HEENT: clear op, mmm, no jvd, no scleral icterus  CV: RR  Extremities: No edema  Lungs: no increased work of breathing  Abd: Non-distended   Neuro: CN: II-XII grossly intact  Psych: normal mood and affect     Results:  Lab Results   Component Value Date    WBC 5.5 03/04/2024    HGB 13.3 03/04/2024    HCT 39.8 03/04/2024    MCV 85.4 03/04/2024    .0 03/04/2024     Lab Results   Component Value Date     03/04/2024    K 3.9 03/04/2024    CO2 24.0 03/04/2024     (H) 03/04/2024    BUN 13 03/04/2024    PHOS 2.1 (L) 08/29/2023    ALB 3.4 03/04/2024       Radiology: n/a    Pathology: n/a    Assessment and Plan:  Recurrent Venous and Arterial Thrombosis and ?possible APLS  reported to be triple positive with recurrent RLE Venous/Arterial thrombosis in 2015 and 2017. However, baseline APLS panel at [facility] have been negative on multiple occasions. Unclear if her titers are negative due to Rituximab   Discussed that she may not have APLS, but with recurrent VTE and AI disease, we will continue coumadin indefinitely.  Continue coumadin, INR goal 2.5-3  IF she proceed with surgery, will proceed with lovenox bridging given her history of recurrent VTE and APLS.   Hold coumadin 5 days prior to surgery  Start Lovenox 1 mg/kg BID 3 days before surgery  Hold lovenox 24 hours prior to surgery  Restart lovenox/coumadin post-operatively until INR is therapeutic     Liver lesion: MRI showed hemangioma.     JEFF  - Hemoglobin is stable.   - Tolerated Venofer in the past.     RA/SLE: She states that she was on q6 month Rituxan and prednisone. Following with Dr. De Los Santos now. Considering methotrexate.     Health Maintenance  - Following with PCP for mammogram and colon cancer screening    RTC  in 12 months .     NICKY Quinones MD, [facility]

## 2024-04-28 ENCOUNTER — APPOINTMENT (OUTPATIENT)
Dept: CT IMAGING | Age: 54
End: 2024-04-28
Attending: EMERGENCY MEDICINE
Payer: COMMERCIAL

## 2024-04-28 ENCOUNTER — HOSPITAL ENCOUNTER (EMERGENCY)
Age: 54
Discharge: HOME | End: 2024-04-28
Attending: EMERGENCY MEDICINE
Payer: COMMERCIAL

## 2024-04-28 VITALS
SYSTOLIC BLOOD PRESSURE: 140 MMHG | TEMPERATURE: 98 F | BODY MASS INDEX: 39.93 KG/M2 | WEIGHT: 217 LBS | HEIGHT: 62 IN | DIASTOLIC BLOOD PRESSURE: 87 MMHG | HEART RATE: 88 BPM | OXYGEN SATURATION: 98 % | RESPIRATION RATE: 18 BRPM

## 2024-04-28 DIAGNOSIS — R07.9 LEFT-SIDED CHEST PAIN: Primary | ICD-10-CM

## 2024-04-28 LAB
ALBUMIN SERPL-MCNC: 3.4 G/DL (ref 3.4–5)
ALBUMIN/GLOB SERPL: 1.1 {RATIO} (ref 1–2)
ALP LIVER SERPL-CCNC: 98 U/L
ALT SERPL-CCNC: 14 U/L
ANION GAP SERPL CALC-SCNC: 7 MMOL/L (ref 0–18)
APTT PPP: 94.5 SECONDS (ref 23.3–35.6)
AST SERPL-CCNC: 17 U/L (ref 15–37)
BASOPHILS # BLD AUTO: 0.03 X10(3) UL (ref 0–0.2)
BASOPHILS NFR BLD AUTO: 0.3 %
BILIRUB SERPL-MCNC: 0.4 MG/DL (ref 0.1–2)
BUN BLD-MCNC: 16 MG/DL (ref 9–23)
CALCIUM BLD-MCNC: 8.9 MG/DL (ref 8.5–10.1)
CHLORIDE SERPL-SCNC: 111 MMOL/L (ref 98–112)
CO2 SERPL-SCNC: 24 MMOL/L (ref 21–32)
CREAT BLD-MCNC: 1.03 MG/DL
EGFRCR SERPLBLD CKD-EPI 2021: 65 ML/MIN/1.73M2 (ref 60–?)
EOSINOPHIL # BLD AUTO: 0.07 X10(3) UL (ref 0–0.7)
EOSINOPHIL NFR BLD AUTO: 0.7 %
ERYTHROCYTE [DISTWIDTH] IN BLOOD BY AUTOMATED COUNT: 14.2 %
GLOBULIN PLAS-MCNC: 3.2 G/DL (ref 2.8–4.4)
GLUCOSE BLD-MCNC: 128 MG/DL (ref 70–99)
HCT VFR BLD AUTO: 40.5 %
HGB BLD-MCNC: 13.4 G/DL
IMM GRANULOCYTES # BLD AUTO: 0.04 X10(3) UL (ref 0–1)
IMM GRANULOCYTES NFR BLD: 0.4 %
INR BLD: 7.21 (ref 0.8–1.2)
LYMPHOCYTES # BLD AUTO: 2.27 X10(3) UL (ref 1–4)
LYMPHOCYTES NFR BLD AUTO: 22.7 %
MCH RBC QN AUTO: 29.2 PG (ref 26–34)
MCHC RBC AUTO-ENTMCNC: 33.1 G/DL (ref 31–37)
MCV RBC AUTO: 88.2 FL
MONOCYTES # BLD AUTO: 0.66 X10(3) UL (ref 0.1–1)
MONOCYTES NFR BLD AUTO: 6.6 %
NEUTROPHILS # BLD AUTO: 6.93 X10 (3) UL (ref 1.5–7.7)
NEUTROPHILS # BLD AUTO: 6.93 X10(3) UL (ref 1.5–7.7)
NEUTROPHILS NFR BLD AUTO: 69.3 %
OSMOLALITY SERPL CALC.SUM OF ELEC: 297 MOSM/KG (ref 275–295)
PLATELET # BLD AUTO: 295 10(3)UL (ref 150–450)
POTASSIUM SERPL-SCNC: 3.5 MMOL/L (ref 3.5–5.1)
PROT SERPL-MCNC: 6.6 G/DL (ref 6.4–8.2)
PROTHROMBIN TIME: 63.3 SECONDS (ref 11.6–14.8)
RBC # BLD AUTO: 4.59 X10(6)UL
SODIUM SERPL-SCNC: 142 MMOL/L (ref 136–145)
TROPONIN I SERPL HS-MCNC: 9 NG/L
WBC # BLD AUTO: 10 X10(3) UL (ref 4–11)

## 2024-04-28 PROCEDURE — 85025 COMPLETE CBC W/AUTO DIFF WBC: CPT | Performed by: EMERGENCY MEDICINE

## 2024-04-28 PROCEDURE — 80053 COMPREHEN METABOLIC PANEL: CPT | Performed by: EMERGENCY MEDICINE

## 2024-04-28 PROCEDURE — 84484 ASSAY OF TROPONIN QUANT: CPT | Performed by: EMERGENCY MEDICINE

## 2024-04-28 PROCEDURE — 85610 PROTHROMBIN TIME: CPT | Performed by: EMERGENCY MEDICINE

## 2024-04-28 PROCEDURE — 99284 EMERGENCY DEPT VISIT MOD MDM: CPT

## 2024-04-28 PROCEDURE — 36415 COLL VENOUS BLD VENIPUNCTURE: CPT

## 2024-04-28 PROCEDURE — 99285 EMERGENCY DEPT VISIT HI MDM: CPT

## 2024-04-28 PROCEDURE — 93005 ELECTROCARDIOGRAM TRACING: CPT

## 2024-04-28 PROCEDURE — 93010 ELECTROCARDIOGRAM REPORT: CPT

## 2024-04-28 PROCEDURE — 85730 THROMBOPLASTIN TIME PARTIAL: CPT | Performed by: EMERGENCY MEDICINE

## 2024-04-28 PROCEDURE — 71275 CT ANGIOGRAPHY CHEST: CPT | Performed by: EMERGENCY MEDICINE

## 2024-04-29 ENCOUNTER — ANTI-COAG VISIT (OUTPATIENT)
Dept: HEMATOLOGY/ONCOLOGY | Facility: HOSPITAL | Age: 54
End: 2024-04-29

## 2024-04-29 DIAGNOSIS — D68.61 ANTIPHOSPHOLIPID ANTIBODY SYNDROME (HCC): Primary | ICD-10-CM

## 2024-04-29 LAB — INR: 7.2 (ref 0.8–1.2)

## 2024-04-29 NOTE — ED PROVIDER NOTES
Patient Seen in: [facility] Emergency Department [place]      History     Chief Complaint   Patient presents with    Breast Problem     Stated Complaint: left breast pain for three days    Subjective:   HPI    54-year-old female presenting to the emergency department for chest pain she has had this pain for the last 3 days left shoulder going down to the left breast sharp shooting type of pain no recent cough or cold no nipple discharge no redness to the breast no history of trauma no other exacerbating leaving factors or associated symptoms.    Objective:   Past Medical History:    DVT, recurrent, lower extremity, acute, right (HCC)    Fatty liver    Fibromyalgia    Hyperlipidemia    Hypertension    Hypothyroidism    Lupus (HCC)    Migraine    Pericarditis (HCC)    Rheumatoid arthritis (HCC)    Sicca syndrome (HCC)              Past Surgical History:   Procedure Laterality Date    Colonoscopy N/A 7/4/2023    Procedure: COLONOSCOPY with cold snare polypectomy;  Surgeon: Hayde Chua MD;  Location:  ENDOSCOPY    Hysterectomy      Knee arthroscopy      Knee replacement surgery                  Social History     Socioeconomic History    Marital status:    Tobacco Use    Smoking status: Never    Smokeless tobacco: Never   Vaping Use    Vaping status: Never Used   Substance and Sexual Activity    Alcohol use: Never    Drug use: Never    Sexual activity: Yes     Partners: Male     Birth control/protection: Hysterectomy   Other Topics Concern    Caffeine Concern Yes    Exercise Yes    Seat Belt Yes     Social Determinants of Health     Food Insecurity: Low Risk  (5/22/2023)    [facility]    Food Insecurity     Have there been times that your food ran out, and you didn't have money to get more?: No     Are there times that you worry that this might happen?: No   Transportation Needs: Low Risk  (5/22/2023)    [facility]    Transportation Needs     Do you have trouble getting transportation to medical appointments?: No    Received from [facility]              Review of Systems    Positive for stated complaint: left breast pain for three days  Other systems are as noted in HPI.  Constitutional and vital signs reviewed.      All other systems reviewed and negative except as noted above.    Physical Exam     ED Triage Vitals [04/28/24 2221]   BP (!) 139/98   Pulse 108   Resp 18   Temp 98.2 °F (36.8 °C)   Temp src Oral   SpO2 99 %   O2 Device None (Room air)       Current:/87   Pulse 88   Temp 98.2 °F (36.8 °C) (Oral)   Resp 18   Ht 157.5 cm (5' 2\")   Wt 98.4 kg   SpO2 98%   BMI 39.69 kg/m²         Physical Exam  Awake alert patient appears no distress HEENT exam is normal lungs were clear cardiovascular exam tachycardic abdomen soft nontender extremities no Cyanosis or edema no rash back exam is normal skin is nondiaphoretic       ED Course     Labs Reviewed   COMP METABOLIC PANEL (14) - Abnormal; Notable for the following components:       Result Value    Glucose 128 (*)     Creatinine 1.03 (*)     Calculated Osmolality 297 (*)     All other components within normal limits   PROTHROMBIN TIME (PT) - Abnormal; Notable for the following components:    PT 63.3 (*)     INR 7.21 (*)     All other components within normal limits   PTT, ACTIVATED - Abnormal; Notable for the following components:    PTT 94.5 (*)     All other components within normal limits   TROPONIN I HIGH SENSITIVITY - Normal   CBC WITH DIFFERENTIAL WITH PLATELET    Narrative:     The following orders were created for panel order CBC With Differential With Platelet.  Procedure                               Abnormality         Status                     ---------                               -----------         ------                     CBC W/  DIFFERENTIAL[[id number]]                              Final result                 Please view results for these tests on the individual orders.   CBC W/ DIFFERENTIAL     EKG    Rate, intervals and axes as noted on EKG Report.  Rate: 101  Rhythm: Sinus Rhythm  Reading: No areas of acute ST segment elevation or depression                 Differential diagnosis includes pulmonary embolism acute coronary syndrome         MDM                                         Medical Decision Making  53-year-old female presenting emerged part for chest pain.  IV established cardiac monitor shows a sinus rhythm pulse ox shows no signs of hypoxia.  CBC shows stable hemoglobin level metabolic panel stable renal function troponin shows no elevation indicative of NSTEMI INR is noted to be supratherapeutic I did discuss with patient about holding her Coumadin for the next 2 days rechecking and then going from there as to any further instructions on her Coumadin.  The patient does have a CT scan performed.  Independent interpretation by ED physician shows no pulmonary embolism.  Patient will be discharged home and is to return to the emergency department for worsening symptoms or other complaints  The patient was screened and evaluated during this visit.  As a treating physician attending to the patient, I determined, within reasonable clinical confidence and prior to discharge, that an emergency medical condition was not or was no longer present.  There was no indication for further evaluation, treatment or admission on an emergency basis.    The usual and customary discharge instructions were discussed given the patient's ER course.  We discussed signs and symptoms that should prompt the patient's immediate return to the emergency department.  Reasonable over-the-counter and prescription treatment options and physician follow-up plan was discussed.  Patient was discharged home in good condition this note was prepared using Dragon  Medical voice recognition dictation software.  As a result errors may occur.  When identified to these areas have been corrected.  While every attempt is made to correct errors during dictation discrepancies may still exist.  Please contact if there are any errors      Problems Addressed:  Left-sided chest pain: acute illness or injury    Amount and/or Complexity of Data Reviewed  Labs: ordered. Decision-making details documented in ED Course.  Radiology: ordered and independent interpretation performed. Decision-making details documented in ED Course.  ECG/medicine tests: ordered and independent interpretation performed. Decision-making details documented in ED Course.        Disposition and Plan     Clinical Impression:  1. Left-sided chest pain         Disposition:  Discharge  4/28/2024 11:48 pm    Follow-up:  No follow-up provider specified.        Medications Prescribed:  Current Discharge Medication List

## 2024-04-29 NOTE — ED INITIAL ASSESSMENT (HPI)
Patient here with left sided chest pain. States the pain starts in the left shoulder and radiates down into breast. Complains of dizziness.

## 2024-04-30 LAB
ATRIAL RATE: 101 BPM
P AXIS: 25 DEGREES
P-R INTERVAL: 156 MS
Q-T INTERVAL: 356 MS
QRS DURATION: 82 MS
QTC CALCULATION (BEZET): 461 MS
R AXIS: 19 DEGREES
T AXIS: 28 DEGREES
VENTRICULAR RATE: 101 BPM

## 2024-05-03 ENCOUNTER — HOSPITAL ENCOUNTER (EMERGENCY)
Facility: HOSPITAL | Age: 54
Discharge: HOME | End: 2024-05-03
Attending: EMERGENCY MEDICINE
Payer: COMMERCIAL

## 2024-05-03 ENCOUNTER — ANTI-COAG VISIT (OUTPATIENT)
Dept: HEMATOLOGY/ONCOLOGY | Facility: HOSPITAL | Age: 54
End: 2024-05-03

## 2024-05-03 ENCOUNTER — APPOINTMENT (OUTPATIENT)
Dept: GENERAL RADIOLOGY | Facility: HOSPITAL | Age: 54
End: 2024-05-03
Attending: EMERGENCY MEDICINE
Payer: COMMERCIAL

## 2024-05-03 VITALS
HEART RATE: 72 BPM | HEIGHT: 62 IN | DIASTOLIC BLOOD PRESSURE: 87 MMHG | OXYGEN SATURATION: 100 % | WEIGHT: 215 LBS | RESPIRATION RATE: 18 BRPM | BODY MASS INDEX: 39.56 KG/M2 | TEMPERATURE: 97 F | SYSTOLIC BLOOD PRESSURE: 142 MMHG

## 2024-05-03 DIAGNOSIS — R07.89 CHEST WALL PAIN: Primary | ICD-10-CM

## 2024-05-03 DIAGNOSIS — D68.61 ANTIPHOSPHOLIPID ANTIBODY SYNDROME (HCC): Primary | ICD-10-CM

## 2024-05-03 DIAGNOSIS — N64.4 BREAST PAIN, LEFT: ICD-10-CM

## 2024-05-03 LAB
ALBUMIN SERPL-MCNC: 3.2 G/DL (ref 3.4–5)
ALBUMIN/GLOB SERPL: 1 {RATIO} (ref 1–2)
ALP LIVER SERPL-CCNC: 86 U/L
ALT SERPL-CCNC: 12 U/L
ANION GAP SERPL CALC-SCNC: 4 MMOL/L (ref 0–18)
AST SERPL-CCNC: 12 U/L (ref 15–37)
BASOPHILS # BLD AUTO: 0.02 X10(3) UL (ref 0–0.2)
BASOPHILS NFR BLD AUTO: 0.3 %
BILIRUB SERPL-MCNC: 0.4 MG/DL (ref 0.1–2)
BUN BLD-MCNC: 17 MG/DL (ref 9–23)
CALCIUM BLD-MCNC: 8.6 MG/DL (ref 8.5–10.1)
CHLORIDE SERPL-SCNC: 112 MMOL/L (ref 98–112)
CO2 SERPL-SCNC: 27 MMOL/L (ref 21–32)
CREAT BLD-MCNC: 0.99 MG/DL
CRP SERPL-MCNC: 1.69 MG/DL (ref ?–0.3)
EGFRCR SERPLBLD CKD-EPI 2021: 69 ML/MIN/1.73M2 (ref 60–?)
EOSINOPHIL # BLD AUTO: 0.1 X10(3) UL (ref 0–0.7)
EOSINOPHIL NFR BLD AUTO: 1.3 %
ERYTHROCYTE [DISTWIDTH] IN BLOOD BY AUTOMATED COUNT: 13.9 %
ERYTHROCYTE [SEDIMENTATION RATE] IN BLOOD: 32 MM/HR
GLOBULIN PLAS-MCNC: 3.2 G/DL (ref 2.8–4.4)
GLUCOSE BLD-MCNC: 79 MG/DL (ref 70–99)
HCT VFR BLD AUTO: 41.1 %
HGB BLD-MCNC: 13.1 G/DL
IMM GRANULOCYTES # BLD AUTO: 0.03 X10(3) UL (ref 0–1)
IMM GRANULOCYTES NFR BLD: 0.4 %
INR: 2.5 (ref 0.8–1.2)
LYMPHOCYTES # BLD AUTO: 2.35 X10(3) UL (ref 1–4)
LYMPHOCYTES NFR BLD AUTO: 30.2 %
MCH RBC QN AUTO: 29 PG (ref 26–34)
MCHC RBC AUTO-ENTMCNC: 31.9 G/DL (ref 31–37)
MCV RBC AUTO: 90.9 FL
MONOCYTES # BLD AUTO: 0.72 X10(3) UL (ref 0.1–1)
MONOCYTES NFR BLD AUTO: 9.3 %
NEUTROPHILS # BLD AUTO: 4.56 X10 (3) UL (ref 1.5–7.7)
NEUTROPHILS # BLD AUTO: 4.56 X10(3) UL (ref 1.5–7.7)
NEUTROPHILS NFR BLD AUTO: 58.5 %
OSMOLALITY SERPL CALC.SUM OF ELEC: 296 MOSM/KG (ref 275–295)
PLATELET # BLD AUTO: 273 10(3)UL (ref 150–450)
POTASSIUM SERPL-SCNC: 3.6 MMOL/L (ref 3.5–5.1)
PROT SERPL-MCNC: 6.4 G/DL (ref 6.4–8.2)
RBC # BLD AUTO: 4.52 X10(6)UL
SODIUM SERPL-SCNC: 143 MMOL/L (ref 136–145)
TROPONIN I SERPL HS-MCNC: <3 NG/L
WBC # BLD AUTO: 7.8 X10(3) UL (ref 4–11)

## 2024-05-03 PROCEDURE — 71045 X-RAY EXAM CHEST 1 VIEW: CPT | Performed by: EMERGENCY MEDICINE

## 2024-05-03 PROCEDURE — 99285 EMERGENCY DEPT VISIT HI MDM: CPT

## 2024-05-03 PROCEDURE — 84484 ASSAY OF TROPONIN QUANT: CPT | Performed by: EMERGENCY MEDICINE

## 2024-05-03 PROCEDURE — 93005 ELECTROCARDIOGRAM TRACING: CPT

## 2024-05-03 PROCEDURE — 85025 COMPLETE CBC W/AUTO DIFF WBC: CPT | Performed by: EMERGENCY MEDICINE

## 2024-05-03 PROCEDURE — 80053 COMPREHEN METABOLIC PANEL: CPT | Performed by: EMERGENCY MEDICINE

## 2024-05-03 PROCEDURE — 96374 THER/PROPH/DIAG INJ IV PUSH: CPT

## 2024-05-03 PROCEDURE — 85652 RBC SED RATE AUTOMATED: CPT | Performed by: EMERGENCY MEDICINE

## 2024-05-03 PROCEDURE — 93010 ELECTROCARDIOGRAM REPORT: CPT

## 2024-05-03 PROCEDURE — 86140 C-REACTIVE PROTEIN: CPT | Performed by: EMERGENCY MEDICINE

## 2024-05-03 RX ORDER — KETOROLAC TROMETHAMINE 15 MG/ML
15 INJECTION, SOLUTION INTRAMUSCULAR; INTRAVENOUS ONCE
Status: COMPLETED | OUTPATIENT
Start: 2024-05-03 | End: 2024-05-03

## 2024-05-03 NOTE — PROGRESS NOTES
5/3/2024  INR 2.5 Stay on Warfarin 5mg Friday, Satuday and Sunday - recheck INR on Monday 5/6/2024 Patient notified.  Justyna HAIR

## 2024-05-04 LAB
ATRIAL RATE: 83 BPM
P AXIS: 51 DEGREES
P-R INTERVAL: 148 MS
Q-T INTERVAL: 364 MS
QRS DURATION: 80 MS
QTC CALCULATION (BEZET): 427 MS
R AXIS: 32 DEGREES
T AXIS: 36 DEGREES
VENTRICULAR RATE: 83 BPM

## 2024-05-04 NOTE — ED PROVIDER NOTES
Patient Seen in: [facility] Emergency Department      History     Chief Complaint   Patient presents with    Chest Pain Angina    Difficulty Breathing     Stated Complaint: chest pain KIKI    Subjective:   HPI    Patient is a 53-year-old female who states has been having some sharp chest pain on her breast for the last week.  States breast feels like it has zingers going up and down it.  Also has felt little nauseated.  Denies any vomiting.  Also when lays down feels like she is having trouble breathing.  Patient is wondering if she possibly has a lupus flare.  Does have a history of a DVT but is on enoxaparin and does take.    Objective:   Past Medical History:    DVT, recurrent, lower extremity, acute, right (HCC)    Fatty liver    Fibromyalgia    Hyperlipidemia    Hypertension    Hypothyroidism    Lupus (HCC)    Migraine    Pericarditis (HCC)    Rheumatoid arthritis (HCC)    Sicca syndrome (HCC)              Past Surgical History:   Procedure Laterality Date    Colonoscopy N/A 7/4/2023    Procedure: COLONOSCOPY with cold snare polypectomy;  Surgeon: Hayde Chua MD;  Location:  ENDOSCOPY    Hysterectomy      Knee arthroscopy      Knee replacement surgery                  Social History     Socioeconomic History    Marital status:    Tobacco Use    Smoking status: Never    Smokeless tobacco: Never   Vaping Use    Vaping status: Never Used   Substance and Sexual Activity    Alcohol use: Never    Drug use: Never    Sexual activity: Yes     Partners: Male     Birth control/protection: Hysterectomy   Other Topics Concern    Caffeine Concern Yes    Exercise Yes    Seat Belt Yes     Social Determinants of Health     Food Insecurity: Low Risk  (5/22/2023)    Received from [facility], [facility]    Food Insecurity     Have there been times that your food ran out, and you didn't have money to get more?: No     Are there times that you worry that this might  happen?: No   Transportation Needs: Low Risk  (5/22/2023)    [facility]    Transportation Needs     Do you have trouble getting transportation to medical appointments?: No    Received from [facility], [facility]              Review of Systems    Positive for stated complaint: chest pain KIKI  Other systems are as noted in HPI.  Constitutional and vital signs reviewed.      All other systems reviewed and negative except as noted above.    Physical Exam     ED Triage Vitals [05/03/24 2048]   BP (!) 153/102   Pulse 82   Resp 18   Temp 97.1 °F (36.2 °C)   Temp src Temporal   SpO2 99 %   O2 Device None (Room air)       Current:/87   Pulse 72   Temp 97.1 °F (36.2 °C) (Temporal)   Resp 18   Ht 157.5 cm (5' 2\")   Wt 97.5 kg   SpO2 100%   BMI 39.32 kg/m²         Physical Exam      Vital signs reviewed  General appearance: Patient is alert and in no acute distress  HEENT: Pupils equal react to light extraocular muscles intact no scleral icterus, mucous membranes are moist, there is no erythema or exudate in the posterior pharynx  Neck: Supple no JVD no lymphadenopathy no meningismus no carotid bruit  CV: Regular rate and rhythm no murmur rub however patient left breast is exquisitely tender to touch but no masses or lumps or redness is seen.  No rash  Respiratory: Clear to auscultation bilaterally no crackles no wheezes no accessory muscle use  Abdomen: Soft nontender nondistended, no rebound no guarding  no hepatosplenomegaly bowel sounds are present , no pulsatile mass  Extremities: No clubbing cyanosis or edema 2+ distal pulses.  Neuro: Cranial nerves II through XII intact with no gross focal sensory or motor abnormality.      ED Course     Labs Reviewed   COMP METABOLIC PANEL (14) - Abnormal; Notable for the following components:       Result Value    Calculated Osmolality 296 (*)     AST 12 (*)     ALT 12 (*)     Albumin  3.2 (*)     All other components within normal limits   SED RATE, WESTERGREN (AUTOMATED) - Abnormal; Notable for the following components:    Sed Rate 32 (*)     All other components within normal limits   C-REACTIVE PROTEIN - Abnormal; Notable for the following components:    C-Reactive Protein 1.69 (*)     All other components within normal limits   TROPONIN I HIGH SENSITIVITY - Normal   CBC WITH DIFFERENTIAL WITH PLATELET    Narrative:     The following orders were created for panel order CBC With Differential With Platelet.  Procedure                               Abnormality         Status                     ---------                               -----------         ------                     CBC W/ DIFFERENTIAL[[id number]]                              Final result                 Please view results for these tests on the individual orders.   RAINBOW DRAW LAVENDER   RAINBOW DRAW LIGHT GREEN   RAINBOW DRAW BLUE   CBC W/ DIFFERENTIAL     EKG    Rate, intervals and axes as noted on EKG Report.  Rate: 83  Rhythm: Sinus Rhythm  Reading: Possible left atrial enlargement no significant change from prior            XR CHEST AP PORTABLE  (CPT=71045)    Result Date: 5/3/2024  CONCLUSION: No acute cardiopulmonary abnormality.   LOCATION:  [facility]      Dictated by (CST): Tashi Sanchez MD on 5/03/2024 at 10:43 PM     Finalized by (CST): Tashi Sanchez MD on 5/03/2024 at 10:43 PM           Patient was evaluated the emergency department checked a CBC chemistry and a troponin sed rate and CRP.  Also will get a chest x-ray.  Will also do a bedside ultrasound just to make sure no significant effusion is noted as patient states she did have a pericardial effusion at 1 point and that is how her lupus was diagnosed.  States however this feels more superficial like it is in her breast.  States she has not had a mammogram in a year.     Bedside ultrasound was done and did look at the heart.  There was no pericardial effusion  noted.    Patient was feeling much better after the Toradol.  However I would like her to follow-up  With her rheumatologist.  Return if any worsening problem.  Cardiac enzymes were negative.  Also needs to have a mammogram.  MDM      Differential diagnosis reflecting the complexity of care include: Lupus flare, costochondritis, breast pain, myocarditis, pericarditis    Comorbidities that add complexity to management include: Lupus        My independent interpretation of studies of: This x-ray unremarkable normal cardiac silhouette.  Lungs are clear      Shared decision making was done by self and patient she was feeling better after the anti-inflammatories however would like her to still get a mammogram as her pain seems mostly locate it in her breast but I do not see any abnormality no lumps or masses palpated.  She agrees with plan.    Patient was screened and evaluated during this visit.  As the treating physician attending to the patient, I determined within reasonable clinical confidence and prior to discharge, that an emergency medical condition was not or was no longer present.  There was no indication for further evaluation, treatment, or admission on an emergency basis.  Comprehensive verbal and written discharge and follow-up instructions were provided to help prevent relapse or worsening.  Patient was instructed to follow-up with primary care provider for further evaluation treatment, return immediately to ER for worsening, concerning, new, or changing/persisting symptoms.  I discussed the case with the patient and they had no questions, complaints, or concerns.  Patient was comfortable going home.      Dictation Disclaimer Note:   To increase efficiency this document may have been prepared using voice recognition technology. Every effort has been made to correct any errors made during preparation of this note. However, if a word or phrase is confusing, or does not make sense, this is likely due to a  recognition error within the program which was not discovered during editing. Please do not hesitate to contact to address any significant errors.    Note to Patient:   The 21st Century Cures Act makes medical notes like these available to patients in the interest of transparency. Please be advised this is a medical document. Medical documents are intended to carry relevant information, facts as evident, and the clinical opinion of the practitioner. The medical note is intended as peer to peer communication and may appear blunt or direct. It is written in medical language and may contain abbreviations or verbiage that are unfamiliar.                                          Medical Decision Making      Disposition and Plan     Clinical Impression:  1. Chest wall pain    2. Breast pain, left         Disposition:  Discharge  5/3/2024 11:20 pm    Follow-up:  Your PCP    Follow up            Medications Prescribed:  Discharge Medication List as of 5/3/2024 11:31 PM

## 2024-05-04 NOTE — ED INITIAL ASSESSMENT (HPI)
Pt to ER with c/o sharp pain to left side of chest for the past week. Pt states \"left breast is sore.\"  +nausea for the oast 2 days, denies vomiting.   +SOB with exertion, unable to laid down for sleep.

## 2024-05-07 ENCOUNTER — ANTI-COAG VISIT (OUTPATIENT)
Dept: HEMATOLOGY/ONCOLOGY | Facility: HOSPITAL | Age: 54
End: 2024-05-07

## 2024-05-07 DIAGNOSIS — D68.61 ANTIPHOSPHOLIPID ANTIBODY SYNDROME (HCC): Primary | ICD-10-CM

## 2024-05-07 LAB — INR: 1.5 (ref 0.8–1.2)

## 2024-05-13 ENCOUNTER — ANTI-COAG VISIT (OUTPATIENT)
Dept: HEMATOLOGY/ONCOLOGY | Facility: HOSPITAL | Age: 54
End: 2024-05-13

## 2024-05-13 DIAGNOSIS — D68.61 ANTIPHOSPHOLIPID ANTIBODY SYNDROME (HCC): Primary | ICD-10-CM

## 2024-05-13 LAB — INR: 2.5 (ref 0.8–1.2)

## 2024-05-23 ENCOUNTER — APPOINTMENT (OUTPATIENT)
Dept: ULTRASOUND IMAGING | Facility: HOSPITAL | Age: 54
End: 2024-05-23

## 2024-05-23 ENCOUNTER — HOSPITAL ENCOUNTER (OUTPATIENT)
Facility: HOSPITAL | Age: 54
Setting detail: OBSERVATION
Discharge: HOME | End: 2024-05-24
Attending: EMERGENCY MEDICINE | Admitting: HOSPITALIST

## 2024-05-23 ENCOUNTER — APPOINTMENT (OUTPATIENT)
Dept: GENERAL RADIOLOGY | Facility: HOSPITAL | Age: 54
End: 2024-05-23

## 2024-05-23 DIAGNOSIS — R07.9 CHEST PAIN OF UNCERTAIN ETIOLOGY: Primary | ICD-10-CM

## 2024-05-23 LAB
ALBUMIN SERPL-MCNC: 3.5 G/DL (ref 3.4–5)
ALBUMIN/GLOB SERPL: 1.1 {RATIO} (ref 1–2)
ALP LIVER SERPL-CCNC: 90 U/L
ALT SERPL-CCNC: 13 U/L
ANION GAP SERPL CALC-SCNC: 6 MMOL/L (ref 0–18)
APTT PPP: 51.6 SECONDS (ref 23–36)
AST SERPL-CCNC: 18 U/L (ref 15–37)
BASOPHILS # BLD AUTO: 0.02 X10(3) UL (ref 0–0.2)
BASOPHILS NFR BLD AUTO: 0.3 %
BILIRUB SERPL-MCNC: 0.5 MG/DL (ref 0.1–2)
BUN BLD-MCNC: 9 MG/DL (ref 9–23)
CALCIUM BLD-MCNC: 8.8 MG/DL (ref 8.5–10.1)
CHLORIDE SERPL-SCNC: 109 MMOL/L (ref 98–112)
CO2 SERPL-SCNC: 26 MMOL/L (ref 21–32)
CREAT BLD-MCNC: 1.02 MG/DL
D DIMER PPP FEU-MCNC: 1.2 UG/ML FEU (ref ?–0.52)
EGFRCR SERPLBLD CKD-EPI 2021: 66 ML/MIN/1.73M2 (ref 60–?)
EOSINOPHIL # BLD AUTO: 0.12 X10(3) UL (ref 0–0.7)
EOSINOPHIL NFR BLD AUTO: 2 %
ERYTHROCYTE [DISTWIDTH] IN BLOOD BY AUTOMATED COUNT: 13.3 %
GLOBULIN PLAS-MCNC: 3.1 G/DL (ref 2.8–4.4)
GLUCOSE BLD-MCNC: 96 MG/DL (ref 70–99)
HCT VFR BLD AUTO: 43.2 %
HGB BLD-MCNC: 13.5 G/DL
IMM GRANULOCYTES # BLD AUTO: 0.01 X10(3) UL (ref 0–1)
IMM GRANULOCYTES NFR BLD: 0.2 %
INR BLD: 2.7 (ref 0.8–1.2)
LYMPHOCYTES # BLD AUTO: 1.95 X10(3) UL (ref 1–4)
LYMPHOCYTES NFR BLD AUTO: 31.8 %
MCH RBC QN AUTO: 28.1 PG (ref 26–34)
MCHC RBC AUTO-ENTMCNC: 31.3 G/DL (ref 31–37)
MCV RBC AUTO: 90 FL
MONOCYTES # BLD AUTO: 0.5 X10(3) UL (ref 0.1–1)
MONOCYTES NFR BLD AUTO: 8.1 %
NEUTROPHILS # BLD AUTO: 3.54 X10 (3) UL (ref 1.5–7.7)
NEUTROPHILS # BLD AUTO: 3.54 X10(3) UL (ref 1.5–7.7)
NEUTROPHILS NFR BLD AUTO: 57.6 %
NT-PROBNP SERPL-MCNC: 484 PG/ML (ref ?–125)
OSMOLALITY SERPL CALC.SUM OF ELEC: 291 MOSM/KG (ref 275–295)
PLATELET # BLD AUTO: 237 10(3)UL (ref 150–450)
POTASSIUM SERPL-SCNC: 4.1 MMOL/L (ref 3.5–5.1)
PROT SERPL-MCNC: 6.6 G/DL (ref 6.4–8.2)
PROTHROMBIN TIME: 29 SECONDS (ref 11.6–14.8)
RBC # BLD AUTO: 4.8 X10(6)UL
SODIUM SERPL-SCNC: 141 MMOL/L (ref 136–145)
TROPONIN I SERPL HS-MCNC: 5 NG/L
WBC # BLD AUTO: 6.1 X10(3) UL (ref 4–11)

## 2024-05-23 PROCEDURE — 71046 X-RAY EXAM CHEST 2 VIEWS: CPT

## 2024-05-23 PROCEDURE — 93971 EXTREMITY STUDY: CPT

## 2024-05-24 ENCOUNTER — APPOINTMENT (OUTPATIENT)
Dept: CV DIAGNOSTICS | Facility: HOSPITAL | Age: 54
End: 2024-05-24
Attending: INTERNAL MEDICINE

## 2024-05-24 ENCOUNTER — APPOINTMENT (OUTPATIENT)
Dept: CT IMAGING | Facility: HOSPITAL | Age: 54
End: 2024-05-24
Attending: EMERGENCY MEDICINE

## 2024-05-24 ENCOUNTER — APPOINTMENT (OUTPATIENT)
Dept: CV DIAGNOSTICS | Facility: HOSPITAL | Age: 54
End: 2024-05-24
Attending: HOSPITALIST

## 2024-05-24 VITALS
SYSTOLIC BLOOD PRESSURE: 130 MMHG | DIASTOLIC BLOOD PRESSURE: 91 MMHG | HEART RATE: 84 BPM | OXYGEN SATURATION: 93 % | RESPIRATION RATE: 20 BRPM | HEIGHT: 62 IN | TEMPERATURE: 98 F | BODY MASS INDEX: 41.96 KG/M2 | WEIGHT: 228 LBS

## 2024-05-24 LAB
ATRIAL RATE: 88 BPM
CHOLEST SERPL-MCNC: 210 MG/DL (ref ?–200)
FLUAV + FLUBV RNA SPEC NAA+PROBE: NEGATIVE
FLUAV + FLUBV RNA SPEC NAA+PROBE: NEGATIVE
HDLC SERPL-MCNC: 52 MG/DL (ref 40–59)
LDLC SERPL CALC-MCNC: 121 MG/DL (ref ?–100)
NONHDLC SERPL-MCNC: 158 MG/DL (ref ?–130)
P AXIS: 44 DEGREES
P-R INTERVAL: 148 MS
Q-T INTERVAL: 386 MS
QRS DURATION: 80 MS
QTC CALCULATION (BEZET): 467 MS
R AXIS: 26 DEGREES
RSV RNA SPEC NAA+PROBE: NEGATIVE
SARS-COV-2 RNA RESP QL NAA+PROBE: NOT DETECTED
T AXIS: 35 DEGREES
TRIGL SERPL-MCNC: 211 MG/DL (ref 30–149)
TROPONIN I SERPL HS-MCNC: 8 NG/L
VENTRICULAR RATE: 88 BPM
VLDLC SERPL CALC-MCNC: 37 MG/DL (ref 0–30)

## 2024-05-24 PROCEDURE — 93350 STRESS TTE ONLY: CPT | Performed by: INTERNAL MEDICINE

## 2024-05-24 PROCEDURE — 93017 CV STRESS TEST TRACING ONLY: CPT | Performed by: INTERNAL MEDICINE

## 2024-05-24 PROCEDURE — B246ZZZ ULTRASONOGRAPHY OF RIGHT AND LEFT HEART: ICD-10-PCS | Performed by: INTERNAL MEDICINE

## 2024-05-24 PROCEDURE — 4A02XM4 MEASUREMENT OF CARDIAC TOTAL ACTIVITY, EXTERNAL APPROACH: ICD-10-PCS | Performed by: INTERNAL MEDICINE

## 2024-05-24 PROCEDURE — 93018 CV STRESS TEST I&R ONLY: CPT | Performed by: INTERNAL MEDICINE

## 2024-05-24 PROCEDURE — 99223 1ST HOSP IP/OBS HIGH 75: CPT | Performed by: HOSPITALIST

## 2024-05-24 PROCEDURE — 93306 TTE W/DOPPLER COMPLETE: CPT | Performed by: HOSPITALIST

## 2024-05-24 PROCEDURE — 71275 CT ANGIOGRAPHY CHEST: CPT | Performed by: EMERGENCY MEDICINE

## 2024-05-24 RX ORDER — WARFARIN SODIUM 2.5 MG/1
7.5 TABLET ORAL
Status: DISCONTINUED | OUTPATIENT
Start: 2024-05-24 | End: 2024-05-24

## 2024-05-24 RX ORDER — ECHINACEA PURPUREA EXTRACT 125 MG
1 TABLET ORAL
Status: DISCONTINUED | OUTPATIENT
Start: 2024-05-24 | End: 2024-05-24

## 2024-05-24 RX ORDER — BISACODYL 10 MG
10 SUPPOSITORY, RECTAL RECTAL
Status: DISCONTINUED | OUTPATIENT
Start: 2024-05-24 | End: 2024-05-24

## 2024-05-24 RX ORDER — PANTOPRAZOLE SODIUM 20 MG/1
20 TABLET, DELAYED RELEASE ORAL DAILY
Status: DISCONTINUED | OUTPATIENT
Start: 2024-05-24 | End: 2024-05-24

## 2024-05-24 RX ORDER — MONTELUKAST SODIUM 10 MG/1
10 TABLET ORAL DAILY
Status: DISCONTINUED | OUTPATIENT
Start: 2024-05-24 | End: 2024-05-24

## 2024-05-24 RX ORDER — ALBUTEROL SULFATE 90 UG/1
2 AEROSOL, METERED RESPIRATORY (INHALATION) EVERY 4 HOURS PRN
Status: DISCONTINUED | OUTPATIENT
Start: 2024-05-24 | End: 2024-05-24

## 2024-05-24 RX ORDER — FLUTICASONE FUROATE AND VILANTEROL 100; 25 UG/1; UG/1
1 POWDER RESPIRATORY (INHALATION) DAILY
Status: DISCONTINUED | OUTPATIENT
Start: 2024-05-24 | End: 2024-05-24

## 2024-05-24 RX ORDER — NITROGLYCERIN 0.4 MG/1
0.4 TABLET SUBLINGUAL EVERY 5 MIN PRN
Status: DISCONTINUED | OUTPATIENT
Start: 2024-05-24 | End: 2024-05-24

## 2024-05-24 RX ORDER — LEVOTHYROXINE SODIUM 88 UG/1
88 TABLET ORAL
Status: DISCONTINUED | OUTPATIENT
Start: 2024-05-24 | End: 2024-05-24

## 2024-05-24 RX ORDER — HYDROCODONE BITARTRATE AND ACETAMINOPHEN 10; 325 MG/1; MG/1
1 TABLET ORAL 3 TIMES DAILY
COMMUNITY
Start: 2024-03-16

## 2024-05-24 RX ORDER — POLYETHYLENE GLYCOL 3350 17 G/17G
17 POWDER, FOR SOLUTION ORAL DAILY PRN
Status: DISCONTINUED | OUTPATIENT
Start: 2024-05-24 | End: 2024-05-24

## 2024-05-24 RX ORDER — ACETAMINOPHEN 500 MG
1000 TABLET ORAL EVERY 4 HOURS PRN
Status: DISCONTINUED | OUTPATIENT
Start: 2024-05-24 | End: 2024-05-24

## 2024-05-24 RX ORDER — ATORVASTATIN CALCIUM 10 MG/1
10 TABLET, FILM COATED ORAL NIGHTLY
Status: DISCONTINUED | OUTPATIENT
Start: 2024-05-24 | End: 2024-05-24

## 2024-05-24 RX ORDER — MELATONIN
3 NIGHTLY PRN
Status: DISCONTINUED | OUTPATIENT
Start: 2024-05-24 | End: 2024-05-24

## 2024-05-24 RX ORDER — MAGNESIUM OXIDE 400 MG (241.3 MG MAGNESIUM) TABLET
325 TABLET DAILY
Status: DISCONTINUED | OUTPATIENT
Start: 2024-05-24 | End: 2024-05-24

## 2024-05-24 RX ORDER — SENNOSIDES 8.6 MG
17.2 TABLET ORAL NIGHTLY PRN
Status: DISCONTINUED | OUTPATIENT
Start: 2024-05-24 | End: 2024-05-24

## 2024-05-24 RX ORDER — HYDROCODONE BITARTRATE AND ACETAMINOPHEN 10; 325 MG/1; MG/1
1 TABLET ORAL EVERY 4 HOURS PRN
Status: DISCONTINUED | OUTPATIENT
Start: 2024-05-24 | End: 2024-05-24

## 2024-05-24 RX ORDER — TOPIRAMATE 25 MG/1
100 TABLET ORAL 2 TIMES DAILY
Status: DISCONTINUED | OUTPATIENT
Start: 2024-05-24 | End: 2024-05-24

## 2024-05-24 RX ORDER — ENEMA 19; 7 G/133ML; G/133ML
1 ENEMA RECTAL ONCE AS NEEDED
Status: DISCONTINUED | OUTPATIENT
Start: 2024-05-24 | End: 2024-05-24

## 2024-05-24 RX ORDER — PROCHLORPERAZINE EDISYLATE 5 MG/ML
5 INJECTION INTRAMUSCULAR; INTRAVENOUS EVERY 8 HOURS PRN
Status: DISCONTINUED | OUTPATIENT
Start: 2024-05-24 | End: 2024-05-24

## 2024-05-24 RX ORDER — FAMOTIDINE 20 MG/1
20 TABLET, FILM COATED ORAL DAILY
Status: DISCONTINUED | OUTPATIENT
Start: 2024-05-24 | End: 2024-05-24

## 2024-05-24 RX ORDER — LISINOPRIL 2.5 MG/1
5 TABLET ORAL DAILY
Status: DISCONTINUED | OUTPATIENT
Start: 2024-05-24 | End: 2024-05-24

## 2024-05-24 RX ORDER — HYDROXYCHLOROQUINE SULFATE 200 MG/1
200 TABLET, FILM COATED ORAL 2 TIMES DAILY
Status: DISCONTINUED | OUTPATIENT
Start: 2024-05-24 | End: 2024-05-24

## 2024-05-24 RX ORDER — TOPIRAMATE 25 MG/1
100 TABLET ORAL 2 TIMES DAILY PRN
Status: DISCONTINUED | OUTPATIENT
Start: 2024-05-24 | End: 2024-05-24

## 2024-05-24 RX ORDER — ALPRAZOLAM 0.5 MG/1
0.25 TABLET ORAL NIGHTLY PRN
Status: DISCONTINUED | OUTPATIENT
Start: 2024-05-24 | End: 2024-05-24

## 2024-05-24 RX ORDER — PREDNISONE 10 MG/1
10 TABLET ORAL DAILY
Status: DISCONTINUED | OUTPATIENT
Start: 2024-05-24 | End: 2024-05-24

## 2024-05-24 RX ORDER — FOLIC ACID 1 MG/1
1 TABLET ORAL DAILY
Status: DISCONTINUED | OUTPATIENT
Start: 2024-05-24 | End: 2024-05-24

## 2024-05-24 RX ORDER — ASPIRIN 325 MG
325 TABLET ORAL DAILY
Status: DISCONTINUED | OUTPATIENT
Start: 2024-05-24 | End: 2024-05-24

## 2024-05-24 RX ORDER — LISINOPRIL 5 MG/1
5 TABLET ORAL DAILY
Qty: 30 TABLET | Refills: 3 | Status: SHIPPED | OUTPATIENT
Start: 2024-05-25

## 2024-05-24 RX ORDER — GABAPENTIN 300 MG/1
300 CAPSULE ORAL 3 TIMES DAILY
Status: DISCONTINUED | OUTPATIENT
Start: 2024-05-24 | End: 2024-05-24

## 2024-05-24 RX ORDER — WARFARIN SODIUM 2.5 MG/1
10 TABLET ORAL
Status: DISCONTINUED | OUTPATIENT
Start: 2024-05-28 | End: 2024-05-24

## 2024-05-24 RX ORDER — ONDANSETRON 2 MG/ML
4 INJECTION INTRAMUSCULAR; INTRAVENOUS EVERY 6 HOURS PRN
Status: DISCONTINUED | OUTPATIENT
Start: 2024-05-24 | End: 2024-05-24

## 2024-05-24 RX ORDER — DOBUTAMINE HYDROCHLORIDE 200 MG/100ML
INJECTION INTRAVENOUS
Status: COMPLETED
Start: 2024-05-24 | End: 2024-05-24

## 2024-05-24 NOTE — ED INITIAL ASSESSMENT (HPI)
RLE edema increasing over past one week. Shortness of breath past two days. Chest pain when lying down. Dry cough past one week

## 2024-05-24 NOTE — ED QUICK NOTES
Orders for admission, patient is aware of plan and ready to go upstairs. Any questions, please call ED RN Rama  at extension [phone].     Vaccinated?  Type of COVID test sent:  COVID Suspicion level: Low/High  low    Titratable drug(s) infusing:  Rate:    LOC at time of transport:  aox4  Other pertinent information:    CIWA score=  NIH score=

## 2024-05-24 NOTE — PROGRESS NOTES
Assumed care at 7:30 am  Alert and oriented x4.   Able to ambulate well on her own  Cardiology states will be able to go today with test is negative.  Will continue to monitor patient. All safety precautions in place.     Stress Test came back negative, MD cleared to go home.   F/U information given to patient.   Used wheelchair to take patient downstairs

## 2024-05-24 NOTE — PROGRESS NOTES
CARDIAC DIAGNOSTICS NOTE       Inpatient dobutamine stress echo ordered by [facility] Cardiologst Dr Ken Herrera.    Resting vitals 94/58, 64, 96% on room air. Dobutamine stress protocol initiated while pt lying in cart. Max dobutamine dose given was 40 mcg/kg/min, achieving a max HR of 144 (86% APMHR). The pt c/o 2/10 chest pain with infusion but otherwise tolerated the testing procedure without difficulty. Echo imaging pending cardiology review. Dr. Herrera notified at 1153.      Maria R Bill RN

## 2024-05-24 NOTE — H&P
[facility]     Denita Gross Patient Status:  Observation    1970 MRN [id number]   Location [facility] 3NE-A Attending Darrel Estes MD   Hosp Day # 0 PCP José Seo DO     Chief Complaint:   Chief Complaint   Patient presents with    Difficulty Breathing    Swelling Edema       Subjective:    History of Present Illness:     Denita Gross is a 53 year old female with a past medical history of this, history of DVT, hyperlipidemia, hypertension, hypothyroidism.  She states that she has felt some right lower extremity swelling but she was concerned about given her history of DVT.  She is on Coumadin with a therapeutic INR.  She had some mild chest discomfort as well.    History/Other:    Past Medical History:  Past Medical History:    DVT, recurrent, lower extremity, acute, right (HCC)    Fatty liver    Fibromyalgia    Hyperlipidemia    Hypertension    Hypothyroidism    Lupus (HCC)    Migraine    Pericarditis (HCC)    Rheumatoid arthritis (HCC)    Sicca syndrome (HCC)     Past Surgical History:   Past Surgical History:   Procedure Laterality Date    Colonoscopy N/A 2023    Procedure: COLONOSCOPY with cold snare polypectomy;  Surgeon: Hayde Chua MD;  Location:  ENDOSCOPY    Hysterectomy      Knee arthroscopy      Knee replacement surgery        Family History:   Family History   Problem Relation Age of Onset    Breast Cancer Maternal Aunt 45    Breast Cancer Paternal Aunt 60    Ovarian Cancer Paternal Aunt 68    Cancer Father     Cancer Mother     Other (cerebral palsey) Daughter     Other (lupus) Brother      Social History:    reports that she has never smoked. She has never used smokeless tobacco. She reports that she does not drink alcohol and does not use drugs.     Allergies:   Allergies   Allergen Reactions    Bactrim [Sulfamethoxazole W/Trimethoprim] HIVES and NAUSEA ONLY    Cleocin [Clindamycin Hcl] HIVES    Penicillins HIVES     Sulfa Antibiotics HIVES    Tramadol NAUSEA ONLY    Keflex [Cephalexin] NAUSEA ONLY       Medications:    No current facility-administered medications on file prior to encounter.     Current Outpatient Medications on File Prior to Encounter   Medication Sig Dispense Refill    HYDROcodone-acetaminophen  MG Oral Tab Take 1 tablet by mouth in the morning, at noon, and at bedtime.      JANTOVEN 10 MG Oral Tab TAKE 1 TABLET BY MOUTH EVERY DAY AS DIRECTED 30 tablet 0    warfarin (JANTOVEN) 10 MG Oral Tab Take 10 mg on tuesdays and thursdays only. Take 7.5 mg all other nights. 30 tablet 0    levothyroxine 88 MCG Oral Tab Take 1 tablet (88 mcg total) by mouth before breakfast.      warfarin 2.5 MG Oral Tab Take 7.5 mg (3 tablets) on Sunday,Monday,Wednesday,Friday, and Saturdays (Patient taking differently: Take 2 tablets (5 mg total) by mouth. Take 10mg on Mondays- rest of days 5mg) 90 tablet 2    gabapentin 100 MG Oral Cap Take 3 capsules (300 mg total) by mouth in the morning, at noon, and at bedtime.      Potassium Chloride ER 10 MEQ Oral Tab CR Take 1 tablet (10 mEq total) by mouth 2 (two) times daily with meals.      ondansetron 4 MG Oral Tablet Dispersible 1 tablet (4 mg total) every 4 (four) hours as needed.      montelukast 10 MG Oral Tab Take 1 tablet (10 mg total) by mouth daily.      Ferrous Sulfate 325 (65 Fe) MG Oral Tab Take by mouth twice a week.      Budesonide-Formoterol Fumarate 80-4.5 MCG/ACT Inhalation Aerosol Inhale into the lungs.      PREDNISONE 10 MG Oral Tab Take 1 tablet (10 mg total) by mouth daily. 90 tablet 0    hydroxychloroquine 200 MG Oral Tab Take 1 tablet (200 mg total) by mouth 2 (two) times daily. 180 tablet 1    Albuterol Sulfate  (90 Base) MCG/ACT Inhalation Aero Soln albuterol sulfate HFA 90 mcg/actuation aerosol inhaler      folic acid 1 MG Oral Tab Take 1 tablet (1 mg total) by mouth daily. 90 tablet 0    topiramate 100 MG Oral Tab Take by mouth 2 (two) times daily. As  needed      atorvastatin 10 MG Oral Tab Take 1 tablet (10 mg total) by mouth nightly.      Butalbital-APAP-Caffeine -40 MG Oral Tab Take 1 tablet by mouth every 4 (four) hours as needed for Pain.      ALPRAZolam 0.25 MG Oral Tab Take 1 tablet (0.25 mg total) by mouth nightly as needed.      fluconazole 100 MG Oral Tab Take 1 tablet (100 mg total) by mouth as needed (mouth sores as needed). Not currently taking      enoxaparin 40 MG/0.4ML Injection Solution Prefilled Syringe Inject 0.4 mL (40 mg total) into the skin daily. (Patient not taking: Reported on 4/18/2024) 10 each 0    acidophilus-pectin Oral Cap Take 1 capsule by mouth daily. 14 capsule 0    Respiratory Therapy Supplies (ADULT AEROSOL MASK) Does not apply Misc Adult Aerosol Mask   USE AS DIRECTED      Respiratory Therapy Supplies (JET-AIR REUSABLE NEBULIZER SYS) Does not apply Kit Reusable Nebulizer Kit   USE EVERY 4 HOURS AS NEEDED         Review of Systems:   A comprehensive review of systems was completed.    Pertinent positives and negatives noted in the HPI.    Objective:   Physical Exam:    BP (!) 160/96 (BP Location: Left arm)   Pulse 80   Temp 98.4 °F (36.9 °C) (Oral)   Resp 16   Ht 5' 2\" (1.575 m)   Wt 228 lb (103.4 kg)   SpO2 100%   BMI 41.70 kg/m²   General: No acute distress, Alert  Respiratory: No rhonchi, no wheezes  Cardiovascular: S1, S2.  Tender to palpation anterior chest wall  Abdomen: Soft, Non-tender, Non-distended, Positive bowel sounds  Neuro: No new focal deficits  Extremities: mild RLE edema      Results:    Labs:      Labs Last 24 Hours:  Recent Labs   Lab 05/23/24 2327   WBC 6.1   HGB 13.5   MCV 90.0   .0   INR 2.70*       Recent Labs   Lab 05/23/24 2327   GLU 96   BUN 9   CREATSERUM 1.02   CA 8.8   ALB 3.5      K 4.1      CO2 26.0   ALKPHO 90   AST 18   ALT 13   BILT 0.5   TP 6.6       Estimated Creatinine Clearance: 51 mL/min (based on SCr of 1.02 mg/dL).    Recent Labs   Lab 05/23/24 2327    TROPHS 5       Recent Labs   Lab 05/23/24  2327   PTP 29.0*   INR 2.70*       No results for input(s): \"TROP\", \"CK\" in the last 168 hours.      Imaging: Imaging data reviewed in Epic.    Assessment & Plan:      # Atypical chest pain  Positive TTP to anterior chest wall  Repeat Trop x 1  Echo  Stress test  Seems non cardiac    # Hypercoagulable state with history of DVT  Ultrasound negative for DVT  CTA negative for PE  Continue Coumadin    #Mild RLE swelling  Likely post thrombotic syndrome from hx of DVT x2  US neg for DVT    # Hyperlipidemia  Statin    # Lupus  Continue home medications    # GERD  PPI    # Fibromyalgia  # Hypothyroid    Dispo: as above        Plan of care discussed with ED physician    Darrel Estes MD    Supplementary Documentation:     The 21st Century Cures Act makes medical notes like these available to patients in the interest of transparency. Please be advised this is a medical document. Medical documents are intended to carry relevant information, facts as evident, and the clinical opinion of the practitioner. The medical note is intended as peer to peer communication and may appear blunt or direct. It is written in medical language and may contain abbreviations or verbiage that are unfamiliar.

## 2024-05-24 NOTE — PLAN OF CARE
Assumed pt care at 0300  Pt is A&Ox4, following commands.   Pt on room air, VSS.  NSR  PRN Norco for pain  Pt one person assist. With cane  Pt on cardiac diet. Tolerating diet. /no caffeine  L AC saline locked  Admission navigator complete  Bed in lowest position, call light in reach.

## 2024-05-24 NOTE — CONSULTS
[facility]  Cardiology Consultation    Denita Gross Patient Status:  Observation    1970 MRN [id number]   Location [facility] 3NE-A Attending Ez Devlin MD   Hosp Day # 0 PCP José Seo DO     Reason for Consultation:  Chest pain    History of Present Illness:  Denita Gross is a a(n) 53 year old female with hx of fibromyalgia, lupus with two prior DVTs now on Coumadin (therapeutic levels) who presents with 1 1/2 week hx of waxing and waning discomfort around her torso radiating to back and arms.  Pain can be as bad as 10/10 and can last as much as one hour.      Due to her hx in ED she had a CTA chest done.  I reviewed this study.  Agree with no PE.  Coronary origins normal. Aorta size normal with no dissection.  No evidence of coronary calcifications in the proximal epicardial vessels.    ECG normal  HS troponin serially normal    Uses cane to ambulate - has had both knees replaced the left in the last year  Mother with hx of HF and  in 50's    No recent echo    History:  Past Medical History:    DVT, recurrent, lower extremity, acute, right (HCC)    Fatty liver    Fibromyalgia    Hyperlipidemia    Hypertension    Hypothyroidism    Lupus (HCC)    Migraine    Pericarditis (HCC)    Rheumatoid arthritis (HCC)    Sicca syndrome (HCC)     Past Surgical History:   Procedure Laterality Date    Colonoscopy N/A 2023    Procedure: COLONOSCOPY with cold snare polypectomy;  Surgeon: Hayde Chua MD;  Location:  ENDOSCOPY    Hysterectomy      Knee arthroscopy      Knee replacement surgery       Family History   Problem Relation Age of Onset    Breast Cancer Maternal Aunt 45    Breast Cancer Paternal Aunt 60    Ovarian Cancer Paternal Aunt 68    Cancer Father     Cancer Mother     Other (cerebral palsey) Daughter     Other (lupus) Brother       reports that she has never smoked. She has never used smokeless tobacco. She reports that she does not drink alcohol and  does not use drugs.    Allergies:  Allergies   Allergen Reactions    Bactrim [Sulfamethoxazole W/Trimethoprim] HIVES and NAUSEA ONLY    Cleocin [Clindamycin Hcl] HIVES    Penicillins HIVES    Sulfa Antibiotics HIVES    Tramadol NAUSEA ONLY    Keflex [Cephalexin] NAUSEA ONLY       Medications:    Current Facility-Administered Medications:     pantoprazole (Protonix) DR tab 20 mg, 20 mg, Oral, Daily    predniSONE (Deltasone) tab 10 mg, 10 mg, Oral, Daily    famotidine (Pepcid) tab 20 mg, 20 mg, Oral, Daily    warfarin (Coumadin) tab 7.5 mg, 7.5 mg, Oral, Once per day on Sunday Monday Wednesday Friday Saturday    [START ON 5/28/2024] warfarin (Coumadin) tab 10 mg, 10 mg, Oral, Once per day on Tuesday Thursday    ALPRAZolam (Xanax) tab 0.25 mg, 0.25 mg, Oral, Nightly PRN    albuterol (Ventolin HFA) 108 (90 Base) MCG/ACT inhaler 2 puff, 2 puff, Inhalation, Q4H PRN    atorvastatin (Lipitor) tab 10 mg, 10 mg, Oral, Nightly    fluticasone furoate-vilanterol (Breo Ellipta) 100-25 MCG/ACT inhaler 1 puff, 1 puff, Inhalation, Daily    ferrous sulfate DR tab 325 mg, 325 mg, Oral, Daily    folic acid (Folvite) tab 1 mg, 1 mg, Oral, Daily    gabapentin (Neurontin) cap 300 mg, 300 mg, Oral, TID    hydroxychloroquine (Plaquenil) tab 200 mg, 200 mg, Oral, BID    levothyroxine (Synthroid) tab 88 mcg, 88 mcg, Oral, Before breakfast    montelukast (Singulair) tab 10 mg, 10 mg, Oral, Daily    acetaminophen (Tylenol Extra Strength) tab 1,000 mg, 1,000 mg, Oral, Q4H PRN    melatonin tab 3 mg, 3 mg, Oral, Nightly PRN    glycerin-hypromellose- (Artificial Tears) 0.2-0.2-1 % ophthalmic solution 1 drop, 1 drop, Both Eyes, QID PRN    sodium chloride (Saline Mist) 0.65 % nasal solution 1 spray, 1 spray, Each Nare, Q3H PRN    ondansetron (Zofran) 4 MG/2ML injection 4 mg, 4 mg, Intravenous, Q6H PRN    prochlorperazine (Compazine) 10 MG/2ML injection 5 mg, 5 mg, Intravenous, Q8H PRN    polyethylene glycol (PEG 3350) (Miralax) 17 g oral  packet 17 g, 17 g, Oral, Daily PRN    sennosides (Senokot) tab 17.2 mg, 17.2 mg, Oral, Nightly PRN    bisacodyl (Dulcolax) 10 MG rectal suppository 10 mg, 10 mg, Rectal, Daily PRN    fleet enema (Fleet) 7-19 GM/118ML rectal enema 133 mL, 1 enema, Rectal, Once PRN    nitroglycerin (Nitrostat) SL tab 0.4 mg, 0.4 mg, Sublingual, Q5 Min PRN    HYDROcodone-acetaminophen (Norco)  MG per tab 1 tablet, 1 tablet, Oral, Q4H PRN    topiramate (TopaMAX) tab 100 mg, 100 mg, Oral, BID PRN    Review of Systems:  A comprehensive review of systems was negative if not otherwise mention in above HPI.    /88 (BP Location: Left arm)   Pulse 76   Temp 99.3 °F (37.4 °C) (Oral)   Resp 16   Ht 62\"   Wt 228 lb   SpO2 96%   BMI 41.70 kg/m²   Temp (24hrs), Av.9 °F (37.2 °C), Min:98.4 °F (36.9 °C), Max:99.3 °F (37.4 °C)     No intake or output data in the 24 hours ending 24 0723  Wt Readings from Last 3 Encounters:   24 228 lb   24 215 lb   24 217 lb       Physical Exam:   General: Alert and oriented x 3. No apparent distress. No respiratory or constitutional distress.  HEENT: Normocephalic, anicteric sclera, neck supple.  Neck: No JVD   Cardiac: Regular rate and rhythm. S1, S2 normal. No murmur, pericardial rub, S3.  Lungs: Clear anteriorly  Extremities: Without edema  Neurologic: Alert and oriented, normal affect.  Skin: Warm and dry.     Laboratory Data:  Lab Results   Component Value Date    WBC 6.1 2024    HGB 13.5 2024    HCT 43.2 2024    .0 2024    CREATSERUM 1.02 2024    BUN 9 2024     2024    K 4.1 2024     2024    CO2 26.0 2024    GLU 96 2024    CA 8.8 2024    ALB 3.5 2024    ALKPHO 90 2024    BILT 0.5 2024    TP 6.6 2024    AST 18 2024    ALT 13 2024    PTT 51.6 2024    INR 2.70 2024    PTP 29.0 2024    DDIMER 1.20 2024       Imaging:  ECG  normal  CTA chest negative for PE, no coronary calcifications noted, normal coronary origins, normal caliber aorta    Impression:  Principal Problem:    Chest pain of uncertain etiology    Hx of DVT - CTA chest negative for PE in ED - on Coumadin with therapeutic INR  HTN - taken off lisinopril by PCP ~1 year ago, but last few weeks BPs running high at home  HLP  Hypothyroidism    Recommendations:  - non-cardiac chest pain - observation and serial troponins negative so far  - I would start with an echo and a Dobutamine stress echo - if unremarkable discharge home today with OP office follow-up  - resume norco for pain  - continue home Coumadin/cholesterol meds  - start lisinopril 5 mg daily and keep home BP log    Thank you for allowing me to participate in the care of your patient.    Ken Herrera MD  5/24/2024  7:23 AM

## 2024-05-24 NOTE — DISCHARGE SUMMARY
[place] [facility]  DISCHARGE SUMMARY     Denita Gross Patient Status:  Observation    1970 MRN [id number]   Location [facility] 3NE-A Attending Ez Devlin MD   Hosp Day # 0 PCP José Seo DO     Date of Admission: 2024  Date of Discharge: 2024  Discharge Disposition: Home or Self Care  Chief complaint:   Chief Complaint   Patient presents with    Difficulty Breathing    Swelling Edema         Discharge Diagnoses and Brief Synopsis:  # Atypical chest pain; probable musculoskeletal in nature; seen by cards and underwent stress echo and showed negative stress test and unremarkably echo.    # Hypercoagulable state with history of DVT-on Coumadin  # Hyperlipidemia  # Lupus  # GERD  # Fibromyalgia  # Hypothyroid        Lab/Test results pending at Discharge:   none        Admission History of Present Illness (author of HPI not necessarily myself; see H&P author): Denita Gross is a 53 year old female with a past medical history of this, history of DVT, hyperlipidemia, hypertension, hypothyroidism.  She states that she has felt some right lower extremity swelling but she was concerned about given her history of DVT.  She is on Coumadin with a therapeutic INR.  She had some mild chest discomfort as well.         Lace+ Score: 56  59-90 High Risk  29-58 Medium Risk  0-28   Low Risk       TCM Follow-Up Recommendation:  LACE 29-58: Moderate Risk of readmission after discharge from the hospital.      Discharge Medication List:     Discharge Medications        START taking these medications        Instructions Prescription details   lisinopril 5 MG Tabs  Commonly known as: Prinivil; Zestril  Start taking on: May 25, 2024      Take 1 tablet (5 mg total) by mouth daily.   Quantity: 30 tablet  Refills: 3            CHANGE how you take these medications        Instructions Prescription details   ondansetron 4 MG Tbdp  Commonly known as: Zofran-ODT  What changed: Another medication  with the same name was removed. Continue taking this medication, and follow the directions you see here.      1 tablet (4 mg total) every 4 (four) hours as needed.   Refills: 0     warfarin 2.5 MG Tabs  Commonly known as: Coumadin  What changed:   how much to take  how to take this  additional instructions      Take as directed. If you are unsure how to take this medication, talk to your nurse or doctor.  Original instructions: Take 7.5 mg (3 tablets) on Sunday,Monday,Wednesday,Friday, and Saturdays   Quantity: 90 tablet  Refills: 2     warfarin 10 MG Tabs  Commonly known as: Jantoven  What changed: Another medication with the same name was changed. Make sure you understand how and when to take each.      Take as directed. If you are unsure how to take this medication, talk to your nurse or doctor.  Original instructions: Take 10 mg on tuesdays and thursdays only. Take 7.5 mg all other nights.   Quantity: 30 tablet  Refills: 0     Jantoven 10 MG Tabs  Generic drug: warfarin  What changed: Another medication with the same name was changed. Make sure you understand how and when to take each.      Take as directed. If you are unsure how to take this medication, talk to your nurse or doctor.  Original instructions: TAKE 1 TABLET BY MOUTH EVERY DAY AS DIRECTED   Quantity: 30 tablet  Refills: 0            CONTINUE taking these medications        Instructions Prescription details   acidophilus-pectin Caps  Commonly known as: Probiotic      Take 1 capsule by mouth daily.   Quantity: 14 capsule  Refills: 0     Adult Aerosol Mask Misc      Adult Aerosol Mask   USE AS DIRECTED   Refills: 0     Jet-Air Reusable Nebulizer Sys Kit      Reusable Nebulizer Kit   USE EVERY 4 HOURS AS NEEDED   Refills: 0     albuterol 108 (90 Base) MCG/ACT Aers  Commonly known as: Ventolin HFA      albuterol sulfate HFA 90 mcg/actuation aerosol inhaler   Refills: 0     ALPRAZolam 0.25 MG Tabs  Commonly known as: Xanax      Take 1 tablet (0.25 mg  total) by mouth nightly as needed.   Refills: 0     atorvastatin 10 MG Tabs  Commonly known as: Lipitor      Take 1 tablet (10 mg total) by mouth nightly.   Refills: 0     Budesonide-Formoterol Fumarate 80-4.5 MCG/ACT Aero  Commonly known as: SYMBICORT      Inhale into the lungs.   Refills: 0     butalbital-acetaminophen-caffeine -40 MG Tabs  Commonly known as: Fioricet      Take 1 tablet by mouth every 4 (four) hours as needed for Pain.   Refills: 0     Ferrous Sulfate 325 (65 Fe) MG Tabs      Take by mouth twice a week.   Refills: 0     fluconazole 100 MG Tabs  Commonly known as: Diflucan      Take 1 tablet (100 mg total) by mouth as needed (mouth sores as needed). Not currently taking   Refills: 0     folic acid 1 MG Tabs  Commonly known as: Folvite      Take 1 tablet (1 mg total) by mouth daily.   Quantity: 90 tablet  Refills: 0     gabapentin 100 MG Caps  Commonly known as: Neurontin      Take 3 capsules (300 mg total) by mouth in the morning, at noon, and at bedtime.   Refills: 0     HYDROcodone-acetaminophen  MG Tabs  Commonly known as: Norco      Take 1 tablet by mouth in the morning, at noon, and at bedtime.   Refills: 0     hydroxychloroquine 200 MG Tabs  Commonly known as: Plaquenil      Take 1 tablet (200 mg total) by mouth 2 (two) times daily.   Quantity: 180 tablet  Refills: 1     levothyroxine 88 MCG Tabs  Commonly known as: Synthroid      Take 1 tablet (88 mcg total) by mouth before breakfast.   Refills: 0     montelukast 10 MG Tabs  Commonly known as: Singulair      Take 1 tablet (10 mg total) by mouth daily.   Refills: 0     Potassium Chloride ER 10 MEQ Tbcr      Take 1 tablet (10 mEq total) by mouth 2 (two) times daily with meals.   Refills: 0     predniSONE 10 MG Tabs  Commonly known as: Deltasone      Take 1 tablet (10 mg total) by mouth daily.   Quantity: 90 tablet  Refills: 0     topiramate 100 MG Tabs  Commonly known as: TopaMAX      Take by mouth 2 (two) times daily. As needed    Refills: 0            STOP taking these medications      enoxaparin 40 MG/0.4ML Sosy  Commonly known as: Lovenox        nitrofurantoin monohydrate macro 100 MG Caps  Commonly known as: Macrobid                  Where to Get Your Medications        These medications were sent to [id number] - [place], [place] - [address] [address] [facility] [address] [phone], [phone] [address] [address], [facility] [address]      Phone: [phone]   lisinopril 5 MG Tabs         [place]    Follow-up appointment:   José Seo DO  [address] [address] [place] [address]  [facility] [address] [phone]    Follow up in 1 week(s)      Ken Herrera MD  [address] [id number] [phone]    Follow up in 2 week(s)  Office will call you for follow up appt    Appointments for Next 30 Days 5/24/2024 - 6/23/2024      None            Vital signs:  Temp:  [97.9 °F (36.6 °C)-99.3 °F (37.4 °C)] 98.2 °F (36.8 °C)  Pulse:  [70-84] 84  Resp:  [15-24] 20  BP: (130-171)/(78-96) 130/91  SpO2:  [93 %-100 %] 93 %      -----------------------------------------------------------------------------------------------  PATIENT DISCHARGE INSTRUCTIONS: See electronic chart    Ez Cortez MD    Time spent:   20 minutes

## 2024-05-24 NOTE — ED PROVIDER NOTES
Patient Seen in: [facility] Emergency Department      History     Chief Complaint   Patient presents with    Difficulty Breathing    Swelling Edema     Stated Complaint: SOB and swelling in right lower leg, hx of DVT    Subjective:   HPI    Patient is a 53-year-old female presents emergency room for evaluation of chest pain, shortness of breath, right leg swelling.  She complains of right leg swelling for the last week.  She states it slightly painful in the front and the back of the right leg from the knee down.  Patient has history of DVT.  She is on warfarin.  INR 3.2 about a week ago.  She complains of exertional shortness of breath for about a week.  She complains of chest pain with exertion or when laying flat for the last 10 days.  She states when she gets the pain if she is exerting herself, it can be sharp or dull or throbbing.  Usually goes away within 30 minutes of rest.  It has been about 20 years since she had a cardiac stress test.  She states she did have hypertension but was taken off blood pressure medicine.  Her mother and grandmother have a history of heart disease.  Denies diabetes or high cholesterol.  She has no history of pulmonary embolism.  She has had a slight dry cough for couple weeks.    Objective:   Past Medical History:    DVT, recurrent, lower extremity, acute, right (HCC)    Fatty liver    Fibromyalgia    Hyperlipidemia    Hypertension    Hypothyroidism    Lupus (HCC)    Migraine    Pericarditis (HCC)    Rheumatoid arthritis (HCC)    Sicca syndrome (HCC)              Past Surgical History:   Procedure Laterality Date    Colonoscopy N/A 7/4/2023    Procedure: COLONOSCOPY with cold snare polypectomy;  Surgeon: Hayde Chua MD;  Location:  ENDOSCOPY    Hysterectomy      Knee arthroscopy      Knee replacement surgery                  Social History     Socioeconomic History    Marital status:    Tobacco Use    Smoking status: Never    Smokeless tobacco: Never    Vaping Use    Vaping status: Never Used   Substance and Sexual Activity    Alcohol use: Never    Drug use: Never    Sexual activity: Yes     Partners: Male     Birth control/protection: Hysterectomy   Other Topics Concern    Caffeine Concern Yes    Exercise Yes    Seat Belt Yes     Social Determinants of Health     Food Insecurity: No Food Insecurity (5/24/2024)    Food Insecurity     Food Insecurity: Never true   Transportation Needs: No Transportation Needs (5/24/2024)    Transportation Needs     Lack of Transportation: No   Housing Stability: Low Risk  (5/24/2024)    Housing Stability     Housing Instability: No              Review of Systems    Positive for stated complaint: SOB and swelling in right lower leg, hx of DVT  Other systems are as noted in HPI.  Constitutional and vital signs reviewed.      All other systems reviewed and negative except as noted above.    Physical Exam     ED Triage Vitals [05/23/24 2004]   /87   Pulse 89   Resp 19   Temp 99.1 °F (37.3 °C)   Temp src Oral   SpO2 97 %   O2 Device None (Room air)       Current Vitals:   Vital Signs  BP: (!) 160/96  Pulse: 80  Resp: 16  Temp: 98.4 °F (36.9 °C)  Temp src: Oral  MAP (mmHg): (!) 115    Oxygen Therapy  SpO2: 100 %  O2 Device: None (Room air)  Pulse Oximetry Type: Continuous  Oximetry Probe Site Changed: No  Pulse Ox Probe Location: Left hand            Physical Exam    GENERAL: No acute distress, well appearing and non-toxic, Alert and oriented X 3   HEENT: Normocephalic, atraumatic.  Moist mucous membranes.  Pupils equal round reactive to light and accommodation, extraocular motion is intact, sclerae white, conjunctiva is pink.  Oropharynx is unremarkable, no exudate.  NECK: Supple, trachea midline, no lymphadenopathy.   LUNG: Lungs clear to auscultation bilaterally, no wheezing, no rales, no rhonchi.  CARDIOVASCULAR: Regular rate and rhythm.  Normal S1S2.  No S3S4 or murmur.  ABDOMEN: Bowel sounds are present. Soft. nondistended,  no pulsatile masses. nontender  MUSCULOSKELETAL:  Dorsalis and Posterior Tibial pulses present. No clubbing. No cyanosis.  Slight swelling to her right lower extremity without warmth erythema from the knee down.  Mild tenderness to the right posterior calf.  Left calf nontender  SKIN EXAMINATIoN: Warm and dry with normal appearance.  No rashes or lesions.  NEUROLOGICAL:  Motor strength intact all groups.  normal sensation, speech intact    ED Course     Labs Reviewed   D-DIMER - Abnormal; Notable for the following components:       Result Value    D-Dimer 1.20 (*)     All other components within normal limits   PRO BETA NATRIURETIC PEPTIDE - Abnormal; Notable for the following components:    Pro-Beta Natriuretic Peptide 484 (*)     All other components within normal limits   PROTHROMBIN TIME (PT) - Abnormal; Notable for the following components:    PT 29.0 (*)     INR 2.70 (*)     All other components within normal limits   PTT, ACTIVATED - Abnormal; Notable for the following components:    PTT 51.6 (*)     All other components within normal limits   LIPID PANEL - Abnormal; Notable for the following components:    Cholesterol, Total 210 (*)     Triglycerides 211 (*)     LDL Cholesterol 121 (*)     VLDL 37 (*)     Non HDL Chol 158 (*)     All other components within normal limits   COMP METABOLIC PANEL (14) - Normal   TROPONIN I HIGH SENSITIVITY - Normal   TROPONIN I HIGH SENSITIVITY - Normal   SARS-COV-2/FLU A AND B/RSV BY PCR (GENEXPERT) - Normal    Narrative:     This test is intended for the qualitative detection and differentiation of SARS-CoV-2, influenza A, influenza B, and respiratory syncytial virus (RSV) viral RNA in nasopharyngeal or nares swabs from individuals suspected of respiratory viral infection consistent with COVID-19 by their healthcare provider. Signs and symptoms of respiratory viral infection due to SARS-CoV-2, influenza, and RSV can be similar.    Test performed using the Xpert Xpress  SARS-CoV-2/FLU/RSV (real time RT-PCR)  assay on the GeneXpert instrument, [facility], [place] [address].   This test is being used under the Food and Drug Administration's Emergency Use Authorization.    The authorized Fact Sheet for Healthcare Providers for this assay is available upon request from the laboratory.   CBC WITH DIFFERENTIAL WITH PLATELET    Narrative:     The following orders were created for panel order CBC With Differential With Platelet.  Procedure                               Abnormality         Status                     ---------                               -----------         ------                     CBC W/ DIFFERENTIAL[[id number]]                              Final result                 Please view results for these tests on the individual orders.   RAINBOW DRAW BLUE   CBC W/ DIFFERENTIAL     EKG    Rate, intervals and axes as noted on EKG Report.  Rate: 88  Rhythm: Sinus Rhythm  Reading: No acute changes                 I personally reviewed xray films of  chest and independent interpretation shows elevated right hemidiaphragm.  I also reviewed formal xray report as read by radiology with findings below:    XR CHEST PA + LAT CHEST (CPT=71046)    Result Date: 5/23/2024  PROCEDURE:  XR CHEST PA + LAT CHEST (CPT=71046)  INDICATIONS:  SOB and swelling in right lower leg, hx of DVT  COMPARISON:  [facility] , XR, XR CHEST AP PORTABLE  (CPT=71045), 5/03/2024, 10:08 PM.  TECHNIQUE:  PA and lateral chest radiographs were obtained.  PATIENT STATED HISTORY: (As transcribed by Technologist)     FINDINGS:  Heart size is within normal limits.  Pleural spaces appear clear.  Mediastinal and hilar contours are normal.  No focal consolidation.  Low lung volumes somewhat limit assessment.  If clinical symptoms persist then recommend follow-up imaging.            CONCLUSION:  See above.   LOCATION:  [facility]   Dictated by (CST): Guy Jeter MD on 5/23/2024 at 9:20 PM     Finalized by (CST): Guy Jeter MD on  5/23/2024 at 9:20 PM       US VENOUS DOPPLER LEG RIGHT - DIAG IMG (CPT=93971)    Result Date: 5/23/2024  PROCEDURE:  US VENOUS DOPPLER LEG RIGHT - DIAG IMG (CPT=93971)  COMPARISON:  [facility], US VENOUS DOPPLER LEG RIGHT - DIAG IMG (CPT=93971), 4/22/2022, 2:05 PM.  INDICATIONS:  Edema, shortness of breath  TECHNIQUE:  Real time, grey scale, and duplex ultrasound was used to evaluate the lower extremity venous system. B-mode two-dimensional images of the vascular structures, Doppler spectral analysis, and color flow.  Doppler imaging were performed.  The following veins were imaged:  Common, deep, and superficial femoral, popliteal, sapheno-femoral junction, posterior tibial veins, and the contralateral common femoral vein.  PATIENT STATED HISTORY: (As transcribed by Technologist)     FINDINGS:  EXTREMITY EXAMINED:  Right lower SAPHENOFEMORAL JUNCTION:  No reflux. THROMBI:  None visible. COMPRESSION:  Normal compressibility, phasicity, and augmentation. OTHER:  Negative.            CONCLUSION:  No evidence of right lower extremity DVT.   LOCATION:  [facility]    Dictated by (CST): Guy Jeter MD on 5/23/2024 at 8:54 PM     Finalized by (CST): Guy Jeter MD on 5/23/2024 at 8:55 PM       CTA chest read by [facility] is negative for pulmonary embolism       MDM      Patient is a 53-year-old female presents emergency room for rash and right lower extremity swelling, chest pain, shortness of breath.  Differential is DVT, PE, ACS.  Patient had laboratory test performed which were unremarkable except for elevated D-dimer of 1.20.  She did have an elevated proBNP.  No clinical evidence for heart failure or pneumonia.  X-ray chest negative.  Ultrasound of the right lower extremity negative for DVT.  Given elevated D-dimer, CT of the chest was performed which was negative for PE.  Patient having exertional symptoms including chest pain, shortness of breath.  Recommend admission to the hospital for provocative cardiac  testing.  Case discussed with hospitalist.  Patient updated.  INR therapeutic.  Workup and results were discussed with patient. Patient has no other questions, complaints or concerns. Patient will be admitted to the hospital for further workup.  Admission disposition: 5/24/2024  1:03 AM                                        Medical Decision Making      Disposition and Plan     Clinical Impression:  1. Chest pain of uncertain etiology    2.  Dyspnea    Disposition:  Admit  5/24/2024  1:03 am    Follow-up:  No follow-up provider specified.        Medications Prescribed:  Current Discharge Medication List                            Hospital Problems       Present on Admission  Date Reviewed: 4/28/2024            ICD-10-CM Noted POA    * (Principal) Chest pain of uncertain etiology R07.9 5/23/2024 Unknown

## 2024-06-12 ENCOUNTER — ANTI-COAG VISIT (OUTPATIENT)
Dept: HEMATOLOGY/ONCOLOGY | Facility: HOSPITAL | Age: 54
End: 2024-06-12

## 2024-06-12 DIAGNOSIS — D68.61 ANTIPHOSPHOLIPID ANTIBODY SYNDROME (HCC): Primary | ICD-10-CM

## 2024-06-12 LAB — INR: 2.2 (ref 0.8–1.2)

## 2024-07-16 ENCOUNTER — ANTI-COAG VISIT (OUTPATIENT)
Dept: HEMATOLOGY/ONCOLOGY | Facility: HOSPITAL | Age: 54
End: 2024-07-16

## 2024-07-16 DIAGNOSIS — D68.61 ANTIPHOSPHOLIPID ANTIBODY SYNDROME (HCC): Primary | ICD-10-CM

## 2024-07-16 LAB — INR: 2.2 (ref 0.8–1.2)

## 2024-08-05 ENCOUNTER — ANTI-COAG VISIT (OUTPATIENT)
Dept: HEMATOLOGY/ONCOLOGY | Facility: HOSPITAL | Age: 54
End: 2024-08-05

## 2024-08-05 DIAGNOSIS — D68.61 ANTIPHOSPHOLIPID ANTIBODY SYNDROME (HCC): Primary | ICD-10-CM

## 2024-08-05 LAB — INR: 2.3 (ref 0.8–1.2)

## 2024-08-16 ENCOUNTER — ANTI-COAG VISIT (OUTPATIENT)
Dept: HEMATOLOGY/ONCOLOGY | Facility: HOSPITAL | Age: 54
End: 2024-08-16

## 2024-08-16 DIAGNOSIS — D68.61 ANTIPHOSPHOLIPID ANTIBODY SYNDROME (HCC): Primary | ICD-10-CM

## 2024-08-16 LAB — INR: 2.4 (ref 0.8–1.2)

## 2024-09-25 ENCOUNTER — ANTI-COAG VISIT (OUTPATIENT)
Dept: HEMATOLOGY/ONCOLOGY | Facility: HOSPITAL | Age: 54
End: 2024-09-25

## 2024-09-25 DIAGNOSIS — D68.61 ANTIPHOSPHOLIPID ANTIBODY SYNDROME (HCC): Primary | ICD-10-CM

## 2024-09-25 LAB — INR: 2.5 (ref 0.8–1.2)

## 2024-09-25 RX ORDER — WARFARIN SODIUM 5 MG/1
TABLET ORAL NIGHTLY
Qty: 60 TABLET | Refills: 0 | Status: SHIPPED | OUTPATIENT
Start: 2024-09-25

## 2024-12-05 NOTE — TELEPHONE ENCOUNTER
Spoke with patient regarding INR dosing. She verbalized understanding. - Patient on Lantus and Humalog at home    - Standing Ademalog D/c'd   - s/p Lantus and ISS  - d/c'd 12/3 as per family wishes for comfort measures

## (undated) DIAGNOSIS — D68.61 ANTIPHOSPHOLIPID ANTIBODY SYNDROME (HCC): ICD-10-CM

## (undated) DIAGNOSIS — M32.19 OTHER SYSTEMIC LUPUS ERYTHEMATOSUS WITH OTHER ORGAN INVOLVEMENT (HCC): ICD-10-CM

## (undated) DIAGNOSIS — Z86.718 HISTORY OF DVT (DEEP VEIN THROMBOSIS): Primary | ICD-10-CM

## (undated) DEVICE — TRAP SPEC REMOVAL ETRAP 15CM

## (undated) DEVICE — ENDOSCOPY PACK - LOWER: Brand: MEDLINE INDUSTRIES, INC.

## (undated) DEVICE — 3M™ RED DOT™ MONITORING ELECTRODE WITH FOAM TAPE AND STICKY GEL, 50/BAG, 20/CASE, 72/PLT 2570: Brand: RED DOT™

## (undated) DEVICE — BIOGUARD CLEANING ADAPTER

## (undated) DEVICE — 1200CC GUARDIAN II: Brand: GUARDIAN

## (undated) DEVICE — FORCEP BIOPSY RJ4 LG CAP W/ND

## (undated) DEVICE — KIT ENDO ORCAPOD 160/180/190

## (undated) DEVICE — 10FT COMBINED O2 DELIVERY/CO2 MONITORING. FILTER WITH MICROSTREAM TYPE LUER: Brand: DUAL ADULT NASAL CANNULA

## (undated) DEVICE — SNARE 9MM 230CM 2.4MM EXACTO

## (undated) NOTE — Clinical Note
Can you see where Vinnie Morenoisamarjyoti is?  May need samples of the 15 mg or will just prescribe the syringe and she will need to draw it up if we can't get rasuvo/otrexup

## (undated) NOTE — Clinical Note
Please start process for injectable methotrexate. Starting at 15mg once weekly with daily folic acid. Once approved, she will need labs monthly to monitor for toxicities.      Marisel Qiu, DO  [facility]  3/1/2021

## (undated) NOTE — Clinical Note
Please do PA and let pt know when able to schedule CT chest high resolution. Thanks.      Mari Rodriguez,   [facility]  1/24/2022

## (undated) NOTE — Clinical Note
Please change- pt's PCP Dr. Marcia Juarez I think through [facility]. Please also fax copy of note to him. [facility]8/25/2020

## (undated) NOTE — LETTER
[address] [place] [address] [address]  [address] [place] [address] [employer]ure [address]  [facility]: [phone]  FAX: [phone]      ANTICOAGULATION CLEARANCE REQUEST    Date: 5/3/2023    Our mutual patient, Lindsey Beck 11/6/1970 is scheduled for Total Knee Replacement. Date of procedure: 5/22/2023    Our records show this patient is on Warfarin (Coumadin). Hold date:  5 days prior to the surgery    Resume date:  see comments below for Coumadin and Lovenox instructions    Comments: Stop Coumadin 5 days before the surgery. Lovenox injections should begin 3 days before surgery. Lovenox injections will be twice daily. No injections the night before or morning of surgery. The night of surgery you will begin Lovenox injections again and begin your usual dose of Coumadin. Continue both and repeat an INR after 4 days. Stay on Lovenox until INR is within range. Signature: Dr Sidney Hope    Date:  5/3/2023    Should you have any questions, please feel free to contact my office at [phone].